# Patient Record
Sex: FEMALE | Race: BLACK OR AFRICAN AMERICAN | NOT HISPANIC OR LATINO | Employment: OTHER | URBAN - METROPOLITAN AREA
[De-identification: names, ages, dates, MRNs, and addresses within clinical notes are randomized per-mention and may not be internally consistent; named-entity substitution may affect disease eponyms.]

---

## 2018-08-13 ENCOUNTER — HOSPITAL ENCOUNTER (EMERGENCY)
Facility: CLINIC | Age: 38
Discharge: HOME | End: 2018-08-13
Attending: EMERGENCY MEDICINE | Admitting: EMERGENCY MEDICINE
Payer: MEDICAID

## 2018-08-13 VITALS
RESPIRATION RATE: 16 BRPM | DIASTOLIC BLOOD PRESSURE: 86 MMHG | HEART RATE: 71 BPM | TEMPERATURE: 97.7 F | SYSTOLIC BLOOD PRESSURE: 136 MMHG | OXYGEN SATURATION: 100 %

## 2018-08-13 DIAGNOSIS — R42 LIGHTHEADEDNESS: ICD-10-CM

## 2018-08-13 PROCEDURE — 99282 EMERGENCY DEPT VISIT SF MDM: CPT | Performed by: EMERGENCY MEDICINE

## 2018-08-13 PROCEDURE — 99282 EMERGENCY DEPT VISIT SF MDM: CPT | Mod: Z6 | Performed by: EMERGENCY MEDICINE

## 2018-08-13 RX ORDER — HYDROCHLOROTHIAZIDE 12.5 MG/1
25 TABLET ORAL DAILY
COMMUNITY
End: 2018-10-30

## 2018-08-13 ASSESSMENT — ENCOUNTER SYMPTOMS
POLYDIPSIA: 0
LIGHT-HEADEDNESS: 0
AGITATION: 0
CHILLS: 0
BRUISES/BLEEDS EASILY: 0
FEVER: 0
ADENOPATHY: 0
NECK STIFFNESS: 0
BACK PAIN: 0
COLOR CHANGE: 0
NECK PAIN: 0
SHORTNESS OF BREATH: 0
VOMITING: 0
NAUSEA: 0
ABDOMINAL PAIN: 0

## 2018-08-13 NOTE — ED AVS SNAPSHOT
[facility], Emergency Department    [address]    [place] [address]    Phone:  [phone]    Fax:  [phone]                                       Elizabeth Hinton   MRN: [id number]    Department:  [facility], Emergency Department   Date of Visit:  8/13/2018           Patient Information     Date Of Birth          1980        Your diagnoses for this visit were:     Lightheadedness        You were seen by Jonah Foster MD.        Discharge Instructions       Please make an appointment to [facility] with [facility] (phone: [phone] as soon as possible in order to establish primary care and for further evaluation.  If her symptoms return please come back to the emergency department.      24 Hour Appointment Hotline       To make an appointment at any [facility] clinic, call [phone] ([phone]). If you don't have a family doctor or clinic, we will help you find one. [facility] clinics are conveniently located to serve the needs of you and your family.             Review of your medicines      Our records show that you are taking the medicines listed below. If these are incorrect, please call your family doctor or clinic.        Dose / Directions Last dose taken    hydrochlorothiazide 12.5 MG Tabs tablet   Dose:  25 mg        Take 25 mg by mouth daily   Refills:  0                Orders Needing Specimen Collection     None      Pending Results     No orders found from 8/11/2018 to 8/14/2018.            Pending Culture Results     No orders found from 8/11/2018 to 8/14/2018.            Pending Results Instructions     If you had any lab results that were not finalized at the time of your Discharge, you can call the ED Lab Result RN at [phone]. You will be contacted by this team for any positive Lab results or changes in treatment. The nurses are available 7 days a week from 10A to 6:30P.  You can leave a message 24 hours per day and they will return your call.       "  Thank you for choosing [facility]       Thank you for choosing [facility] for your care. Our goal is always to provide you with excellent care. Hearing back from our patients is one way we can continue to improve our services. Please take a few minutes to complete the written survey that you may receive in the mail after you visit with us. Thank you!        [employer] Information     [employer] lets you send messages to your doctor, view your test results, renew your prescriptions, schedule appointments and more. To sign up, go to [email or web address] . Click on \"Log in\" on the left side of the screen, which will take you to the Welcome page. Then click on \"Sign up Now\" on the right side of the page.     You will be asked to enter the access code listed below, as well as some personal information. Please follow the directions to create your username and password.     Your access code is: [email or web address]  Expires: 2018  5:50 PM     Your access code will  in 90 days. If you need help or a new code, please call your [facility] clinic or [phone].        Care EveryWhere ID     This is your Care EveryWhere ID. This could be used by other organizations to access your [facility] medical records  [id number]        Equal Access to Services     MARTA BRONSON : Srinivasa madrido Ean, waaxda luqadaha, qaybta kaalmada adehermiloyada, trino finch. So [facility] [phone].    ATENCIÓN: Si habla español, tiene a matt disposición servicios gratuitos de asistencia lingüística. Llame al [phone].    We comply with applicable federal civil rights laws and [place] laws. We do not discriminate on the basis of race, color, national origin, age, disability, sex, sexual orientation, or gender identity.            After Visit Summary       This is your record. Keep this with you and show to your community pharmacist(s) and doctor(s) at your next visit.                  "

## 2018-08-13 NOTE — DISCHARGE INSTRUCTIONS
Please make an appointment to follow up with [facility] (phone: [phone] as soon as possible in order to establish primary care and for further evaluation.  If her symptoms return please come back to the emergency department.

## 2018-08-13 NOTE — ED PROVIDER NOTES
History     Chief Complaint   Patient presents with     Dizziness     Hypertension     HPI  Elizabeth Hinton is a 38 year old female who presents to the emergency department due to one episode of brief lightheadedness. She thought that her blood pressure might have been elevated at the time. Episode resolved within several minutes and she has been asymptomatic ever since. She denies any pain or fevers. No nausea/vomiting. No headaches. No visual changes. No neck pain. No chest pain or shortness of breath. She states that she was incarcerated for the last 2.5 years and that she was released approximately 2 months ago. However, for the last 1.5 months she has not had her antihypertensive medications. No other concerns or complaints.    This part of the document was transcribed by Meera Galvan, Medical Scribe.     Past Medical History:   Diagnosis Date     Abnormal Pap smear of vagina      Hypertension        History reviewed. No pertinent surgical history.    No family history on file.    Social History   Substance Use Topics     Smoking status: Current Every Day Smoker     Packs/day: 1.00     Smokeless tobacco: Never Used     Alcohol use No       No current facility-administered medications for this encounter.      Current Outpatient Prescriptions   Medication     hydrochlorothiazide 12.5 MG TABS tablet      No Known Allergies    I have reviewed the Medications, Allergies, Past Medical and Surgical History, and Social History in the Epic system.    Review of Systems   Constitutional: Negative for chills and fever.   HENT: Negative for congestion.    Eyes: Negative for visual disturbance.   Respiratory: Negative for shortness of breath.    Cardiovascular: Negative for chest pain.   Gastrointestinal: Negative for abdominal pain, nausea and vomiting.   Endocrine: Negative for polydipsia and polyuria.   Musculoskeletal: Negative for back pain, neck pain and neck stiffness.   Skin: Negative for color change.    Neurological: Negative for light-headedness.   Hematological: Negative for adenopathy. Does not bruise/bleed easily.   Psychiatric/Behavioral: Negative for agitation and behavioral problems.   All other systems reviewed and are negative.      Physical Exam   BP: 117/70  Pulse: 80  Temp: 98.7  F (37.1  C)  Resp: 16  SpO2: 100 %      Physical Exam   Constitutional: She is oriented to person, place, and time. She appears well-developed and well-nourished. No distress.   HENT:   Head: Normocephalic and atraumatic.   Mouth/Throat: Oropharynx is clear and moist. No oropharyngeal exudate.   Eyes: Conjunctivae and EOM are normal. Pupils are equal, round, and reactive to light. No scleral icterus.   Neck: Normal range of motion. Neck supple.   Cardiovascular: Normal rate, normal heart sounds and intact distal pulses.    Pulmonary/Chest: Effort normal and breath sounds normal. No respiratory distress.   Abdominal: Soft. Bowel sounds are normal. There is no tenderness.   Musculoskeletal: Normal range of motion. She exhibits no edema or tenderness.   Lymphadenopathy:     She has no cervical adenopathy.   Neurological: She is alert and oriented to person, place, and time. She has normal strength and normal reflexes. She displays normal reflexes. No cranial nerve deficit or sensory deficit. She exhibits normal muscle tone. Coordination and gait normal. GCS eye subscore is 4. GCS verbal subscore is 5. GCS motor subscore is 6.   No dysarthria, dysmetria, diplopia, disdiadochokinesia. Strength 5/5 in b/l UEs with  and b/l LEs with dorsi- and plantar-flexion against resistance. Sensation to light touch and 2-point discrimination intact in b/l distal UEs and LEs. CNs II-XII intact. Negative Romberg. Normal gait.     Skin: Skin is warm. No rash noted. She is not diaphoretic.   Psychiatric: She has a normal mood and affect. Her behavior is normal. Judgment and thought content normal.   Nursing note and vitals reviewed.      ED  Course     ED Course     Procedures             Critical Care time:  none             Labs Ordered and Resulted from Time of ED Arrival Up to the Time of Departure from the ED - No data to display         Assessments & Plan (with Medical Decision Making)   38 year old woman presenting with lightheadedness. Differential diagnosis: hypertension, unlikely arhythmia, unlikely electrolyte abnormalities.    After thorough history and physical exam patient appears to be in no acute distress. She has been asymptomatic ever since she came to the emergency department. She is not hypertensive. I offered to give her a prescription for hydrocholothiazide, however, she declined and stated if she has been off medication for the last 1.5 months she doesn't feel she needs them until she follows up with her primary care clinic. I believe this is reasonable. I did inform her that she should return to the emergency department if her symptoms return and she agrees with this plan. I do not believe that any further testing is necessary at this time. [place] is normal at time of discharge.    This part of the document was transcribed by Meera Galvan, Medical Scribe.     I have reviewed the nursing notes.    I have reviewed the findings, diagnosis, plan and need for follow up with the patient.    Discharge Medication List as of 8/13/2018  6:02 PM          Final diagnoses:   Lightheadedness     I was physically present and have reviewed and verified the accuracy of this note documented by my scribe.    Jonah Foster MD      8/13/2018   [facility], [place], EMERGENCY DEPARTMENT     Jonah Foster MD  08/14/18 0138

## 2018-08-13 NOTE — ED AVS SNAPSHOT
[facility], Emergency Department    [address]    [facility] [address]    Phone:  [phone]    Fax:  [phone]                                       Elizabeth Hinton   MRN: [id number]    Department:  [facility], Emergency Department   Date of Visit:  8/13/2018           After Visit Summary Signature Page     I have received my discharge instructions, and my questions have been answered. I have discussed any challenges I see with this plan with the nurse or doctor.    ..........................................................................................................................................  Patient/Patient Representative Signature      ..........................................................................................................................................  Patient Representative Print Name and Relationship to Patient    ..................................................               ................................................  Date                                            Time    ..........................................................................................................................................  Reviewed by Signature/Title    ...................................................              ..............................................  Date                                                            Time

## 2018-10-18 ENCOUNTER — OFFICE VISIT (OUTPATIENT)
Dept: FAMILY MEDICINE | Facility: CLINIC | Age: 38
End: 2018-10-18
Payer: MEDICAID

## 2018-10-18 ENCOUNTER — RESULT FOLLOW UP (OUTPATIENT)
Dept: FAMILY MEDICINE | Facility: CLINIC | Age: 38
End: 2018-10-18

## 2018-10-18 VITALS
TEMPERATURE: 99 F | DIASTOLIC BLOOD PRESSURE: 80 MMHG | HEART RATE: 102 BPM | BODY MASS INDEX: 28.7 KG/M2 | HEIGHT: 63 IN | WEIGHT: 162 LBS | OXYGEN SATURATION: 97 % | SYSTOLIC BLOOD PRESSURE: 146 MMHG | RESPIRATION RATE: 16 BRPM

## 2018-10-18 DIAGNOSIS — Z11.4 SCREENING FOR HIV (HUMAN IMMUNODEFICIENCY VIRUS): ICD-10-CM

## 2018-10-18 DIAGNOSIS — R30.0 DYSURIA: ICD-10-CM

## 2018-10-18 DIAGNOSIS — Z87.42 HISTORY OF ABNORMAL CERVICAL PAP SMEAR: ICD-10-CM

## 2018-10-18 DIAGNOSIS — R87.610 ASCUS WITH POSITIVE HIGH RISK HPV CERVICAL: ICD-10-CM

## 2018-10-18 DIAGNOSIS — Z11.3 SCREEN FOR STD (SEXUALLY TRANSMITTED DISEASE): ICD-10-CM

## 2018-10-18 DIAGNOSIS — R03.0 ELEVATED BP WITHOUT DIAGNOSIS OF HYPERTENSION: ICD-10-CM

## 2018-10-18 DIAGNOSIS — R87.810 ASCUS WITH POSITIVE HIGH RISK HPV CERVICAL: ICD-10-CM

## 2018-10-18 DIAGNOSIS — F17.200 TOBACCO DEPENDENCE SYNDROME: ICD-10-CM

## 2018-10-18 DIAGNOSIS — G43.711 INTRACTABLE CHRONIC MIGRAINE WITHOUT AURA AND WITH STATUS MIGRAINOSUS: ICD-10-CM

## 2018-10-18 DIAGNOSIS — N76.0 BV (BACTERIAL VAGINOSIS): ICD-10-CM

## 2018-10-18 DIAGNOSIS — B96.89 BV (BACTERIAL VAGINOSIS): ICD-10-CM

## 2018-10-18 DIAGNOSIS — Z00.01 ENCOUNTER FOR ROUTINE ADULT HEALTH EXAMINATION WITH ABNORMAL FINDINGS: Primary | ICD-10-CM

## 2018-10-18 LAB
ALBUMIN UR-MCNC: NEGATIVE MG/DL
APPEARANCE UR: CLEAR
BACTERIA #/AREA URNS HPF: ABNORMAL /HPF
BILIRUB UR QL STRIP: NEGATIVE
COLOR UR AUTO: YELLOW
GLUCOSE UR STRIP-MCNC: NEGATIVE MG/DL
HGB UR QL STRIP: NEGATIVE
KETONES UR STRIP-MCNC: NEGATIVE MG/DL
LEUKOCYTE ESTERASE UR QL STRIP: ABNORMAL
NITRATE UR QL: NEGATIVE
NON-SQ EPI CELLS #/AREA URNS LPF: ABNORMAL /LPF
PH UR STRIP: 6.5 PH (ref 5–7)
RBC #/AREA URNS AUTO: ABNORMAL /HPF
SOURCE: ABNORMAL
SP GR UR STRIP: 1.02 (ref 1–1.03)
UROBILINOGEN UR STRIP-ACNC: 0.2 EU/DL (ref 0.2–1)
WBC #/AREA URNS AUTO: ABNORMAL /HPF

## 2018-10-18 PROCEDURE — 88175 CYTOPATH C/V AUTO FLUID REDO: CPT | Performed by: PHYSICIAN ASSISTANT

## 2018-10-18 PROCEDURE — 87086 URINE CULTURE/COLONY COUNT: CPT | Performed by: PHYSICIAN ASSISTANT

## 2018-10-18 PROCEDURE — 81001 URINALYSIS AUTO W/SCOPE: CPT | Performed by: PHYSICIAN ASSISTANT

## 2018-10-18 PROCEDURE — 87491 CHLMYD TRACH DNA AMP PROBE: CPT | Performed by: PHYSICIAN ASSISTANT

## 2018-10-18 PROCEDURE — 87210 SMEAR WET MOUNT SALINE/INK: CPT | Performed by: PHYSICIAN ASSISTANT

## 2018-10-18 PROCEDURE — 99213 OFFICE O/P EST LOW 20 MIN: CPT | Mod: 25 | Performed by: PHYSICIAN ASSISTANT

## 2018-10-18 PROCEDURE — 87624 HPV HI-RISK TYP POOLED RSLT: CPT | Performed by: PHYSICIAN ASSISTANT

## 2018-10-18 PROCEDURE — 99385 PREV VISIT NEW AGE 18-39: CPT | Mod: 25 | Performed by: PHYSICIAN ASSISTANT

## 2018-10-18 PROCEDURE — 87591 N.GONORRHOEAE DNA AMP PROB: CPT | Performed by: PHYSICIAN ASSISTANT

## 2018-10-18 PROCEDURE — G0476 HPV COMBO ASSAY CA SCREEN: HCPCS | Performed by: PHYSICIAN ASSISTANT

## 2018-10-18 PROCEDURE — 88141 CYTOPATH C/V INTERPRET: CPT | Performed by: PHYSICIAN ASSISTANT

## 2018-10-18 RX ORDER — METRONIDAZOLE 7.5 MG/G
1 GEL VAGINAL 2 TIMES DAILY
Qty: 50 G | Refills: 0 | Status: SHIPPED | OUTPATIENT
Start: 2018-10-18 | End: 2018-10-23

## 2018-10-18 RX ORDER — TOPIRAMATE 25 MG/1
TABLET, FILM COATED ORAL
Qty: 70 TABLET | Refills: 0 | Status: SHIPPED | OUTPATIENT
Start: 2018-10-18 | End: 2018-10-30

## 2018-10-18 RX ORDER — NICOTINE 21 MG/24HR
1 PATCH, TRANSDERMAL 24 HOURS TRANSDERMAL EVERY 24 HOURS
Qty: 45 PATCH | Refills: 0 | Status: SHIPPED | OUTPATIENT
Start: 2018-10-18 | End: 2019-12-09

## 2018-10-18 NOTE — LETTER
March 20, 2019      Elizabeth G Jamaal  [address]  [facility] [address]      Dear Ms. Hinton,    At [facility], your health and wellness is our primary concern. That is why we are following up on a colposcopy  from 12/21/18, which was reported as PASTORA 2/3.  Your Provider had recommended that you have a LEEP  completed by 03/21/19. Our records do not show that this has been done or scheduled.    It is important to complete the follow up that your provider has suggested for you to ensure that there are no worsening changes which may, over time, develop into cancer.      Please call our office at  [phone] to schedule an appointment for a LEEP (this cannot be scheduled through [facility]) at your earliest convenience. If you have questions or concerns, please call the clinic and we will be happy to assist you.    If you have completed the tests outside of [facility], please have the results forwarded to our office. We will update the chart for your primary Physician to review before your next annual physical.     Thank you for choosing [facility]!    Sincerely,      Your [facility] Care Team/[facility]

## 2018-10-18 NOTE — LETTER
December 18, 2018      Elizabeth FLOR Jamaal  [address]  [facility] [address]    Dear ,      At [facility], your health and wellness is our primary concern. That is why we are following up on an abnormal pap from 10/18/18, which was reported as ASCUS and positive for high risk HPV. Your provider had recommended that you have a Colposcopy  completed by 01/18/19. Our records do not show that this has been scheduled.    It is important to complete the follow up that your provider has suggested for you to ensure that there are no worsening changes which may, over time, develop into cancer.      Please contact our office at  [phone] to schedule an appointment for a Colposcopy (this cannot be scheduled through [facility]) at your earliest convenience. If you have questions or concerns, please call the clinic and we will be happy to assist you.    If you have completed the tests outside of [facility], please have the results forwarded to our office. We will update the chart for your primary Physician to review before your next annual physical.     Thank you for choosing [facility]!    Sincerely,      Patricia Brown PA-C/amanda

## 2018-10-18 NOTE — LETTER
August 13, 2019      Elizabeth CHACHO Jamaal  [address]  [facility] [address]      Dear Ms. Hinton,    At [facility], your health and wellness is our primary concern. That is why we are following up on a positive high risk HPV test from 06/13/19.  Your Provider had recommended that you have a Colposcopy completed by 09/13/19. Our records do not show that this has been scheduled.    It is important to complete the follow up that your provider has suggested for you to ensure that there are no worsening changes which may, over time, develop into cancer.      Please call our office at [phone] to schedule an appointment for a Colposcopy (this cannot be scheduled through [facility]) at your earliest convenience. If you have questions or concerns, please call the clinic and we will be happy to assist you.    If you have completed the tests outside of [facility], please have the results forwarded to our office. We will update the chart for your primary Physician to review before your next annual physical.     Thank you for choosing [facility]!    Sincerely,      Your [facility] Care Team/[facility]

## 2018-10-18 NOTE — LETTER
December 19, 2019      Elizabeth G Jamaal  [address] [facility] [address]    Dear MsJuan CarlosJamaal,      At [facility], your health and wellness is our primary concern. That is why we are following up on a positive high risk HPV test from 06/13/19.  Your Provider had recommended that you have a Colposcopy completed by 09/13/19. Our records do not show that this has been done or scheduled.      If you have chosen not to do the recommended colposcopy, please contact our office at [phone] to schedule an appointment for a repeat PAP smear and HPV test at this time.    If you have completed the tests outside of [facility], please have the results forwarded to our office. We will update the chart for your primary Physician to review before your next annual physical.     Thank you for choosing [facility]!    Sincerely,      Your [facility] Care Team//[facility]

## 2018-10-18 NOTE — PATIENT INSTRUCTIONS
Start topamax as directed  Bring your results for PAP  Obtain blood pressure machine from drug store  Take blood pressure daily after sitting calm for 10 minutes  Record readings daily for 2 weeks  Recheck with me in 1-2 weeks  Nothing to eat or drink for 8 hours prior except water  Return to clinic for any new or worsening symptoms or go to ER Urgent care in off hours      Preventive Health Recommendations  Female Ages 26 - 39  Yearly exam:   See your health care provider every year in order to    Review health changes.     Discuss preventive care.      Review your medicines if you your doctor has prescribed any.    Until age 30: Get a Pap test every three years (more often if you have had an abnormal result).    After age 30: Talk to your doctor about whether you should have a Pap test every 3 years or have a Pap test with HPV screening every 5 years.   You do not need a Pap test if your uterus was removed (hysterectomy) and you have not had cancer.  You should be tested each year for STDs (sexually transmitted diseases), if you're at risk.   Talk to your provider about how often to have your cholesterol checked.  If you are at risk for diabetes, you should have a diabetes test (fasting glucose).  Shots: Get a flu shot each year. Get a tetanus shot every 10 years.   Nutrition:     Eat at least 5 servings of fruits and vegetables each day.    Eat whole-grain bread, whole-wheat pasta and brown rice instead of white grains and rice.    Get adequate Calcium and Vitamin D.     Lifestyle    Exercise at least 150 minutes a week (30 minutes a day, 5 days of the week). This will help you control your weight and prevent disease.    Limit alcohol to one drink per day.    No smoking.     Wear sunscreen to prevent skin cancer.    See your dentist every six months for an exam and cleaning.
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
## 2018-10-18 NOTE — MR AVS SNAPSHOT
After Visit Summary   10/18/2018    Elizabeth Hinton    MRN: [id number]           Patient Information     Date Of Birth          1980        Visit Information        Provider Department      10/18/2018 2:20 PM Patricia Brown PA-C [facility]        Today's Diagnoses     Dysuria    -  1    Screening for malignant neoplasm of cervix        Screening for HIV (human immunodeficiency virus)        History of abnormal cervical Pap smear        Lightheadedness        Elevated BP without diagnosis of hypertension        Intractable chronic migraine without aura and with status migrainosus        Screen for STD (sexually transmitted disease)          Care Instructions    Start topamax as directed  Bring your results for PAP  Obtain blood pressure machine from drug store  Take blood pressure daily after sitting calm for 10 minutes  Record readings daily for 2 weeks  Recheck with me in 1-2 weeks  Nothing to eat or drink for 8 hours prior except water  Return to clinic for any new or worsening symptoms or go to ER Urgent care in off hours      Preventive Health Recommendations  Female Ages 26 - 39  Yearly exam:   See your health care provider every year in order to    Review health changes.     Discuss preventive care.      Review your medicines if you your doctor has prescribed any.    Until age 30: Get a Pap test every three years (more often if you have had an abnormal result).    After age 30: Talk to your doctor about whether you should have a Pap test every 3 years or have a Pap test with HPV screening every 5 years.   You do not need a Pap test if your uterus was removed (hysterectomy) and you have not had cancer.  You should be tested each year for STDs (sexually transmitted diseases), if you're at risk.   Talk to your provider about how often to have your cholesterol checked.  If you are at risk for diabetes, you should have a diabetes test (fasting glucose).  Shots: Get  "a flu shot each year. Get a tetanus shot every 10 years.   Nutrition:     Eat at least 5 servings of fruits and vegetables each day.    Eat whole-grain bread, whole-wheat pasta and brown rice instead of white grains and rice.    Get adequate Calcium and Vitamin D.     Lifestyle    Exercise at least 150 minutes a week (30 minutes a day, 5 days of the week). This will help you control your weight and prevent disease.    Limit alcohol to one drink per day.    No smoking.     Wear sunscreen to prevent skin cancer.    See your dentist every six months for an exam and cleaning.            Follow-ups after your visit        Who to contact     If you have questions or need follow up information about today's clinic visit or your schedule please contact [facility] directly at [phone].  Normal or non-critical lab and imaging results will be communicated to you by MyChart, letter or phone within 4 business days after the clinic has received the results. If you do not hear from us within 7 days, please contact the clinic through MyChart or phone. If you have a critical or abnormal lab result, we will notify you by phone as soon as possible.  Submit refill requests through Aras or call your pharmacy and they will forward the refill request to us. Please allow 3 business days for your refill to be completed.          Additional Information About Your Visit        Aras Information     Aras lets you send messages to your doctor, view your test results, renew your prescriptions, schedule appointments and more. To sign up, go to [email or web address] . Click on \"Log in\" on the left side of the screen, which will take you to the Welcome page. Then click on \"Sign up Now\" on the right side of the page.     You will be asked to enter the access code listed below, as well as some personal information. Please follow the directions to create your username and password.     Your access code is: " "[email or web address]  Expires: 2018  5:50 PM     Your access code will  in 90 days. If you need help or a new code, please call your [place] clinic or [phone].        Care EveryWhere ID     This is your Care EveryWhere ID. This could be used by other organizations to access your [place] medical records  [id number]        Your Vitals Were     Pulse Temperature Respirations Height Last Period Pulse Oximetry    102 99  F (37.2  C) (Oral) 16 5' 3\" (1.6 m) 2018 (Approximate) 97%    BMI (Body Mass Index)                   28.7 kg/m2            Blood Pressure from Last 3 Encounters:   10/18/18 146/80   18 136/86    Weight from Last 3 Encounters:   10/18/18 162 lb (73.5 kg)              We Performed the Following     *UA reflex to Microscopic and Culture ([place] [facility] (except [place] and [place])     CHLAMYDIA TRACHOMATIS PCR     HIV Screening     HPV High Risk Types DNA Cervical     NEISSERIA GONORRHOEA PCR     Pap imaged thin layer diagnostic with HPV (select HPV order below)     Urine Microscopic     Wet prep          Today's Medication Changes          These changes are accurate as of 10/18/18  3:05 PM.  If you have any questions, ask your nurse or doctor.               Start taking these medicines.        Dose/Directions    order for DME   Used for:  Elevated BP without diagnosis of hypertension   Started by:  Patricia Brown PA-C        Equipment being ordered: blood pressure machine   Quantity:  1 each   Refills:  0       topiramate 25 MG tablet   Commonly known as:  TOPAMAX   Used for:  Intractable chronic migraine without aura and with status migrainosus   Started by:  Patricia Brown PA-C        Take 1 tablet (25 mg) at bedtime for 1 week, then 1 tablet twice daily for 1 week, then 1 tablet in AM and 2 in PM for 1 week, then 2 tablets twice daily.   Quantity:  70 tablet   Refills:  0            Where to get your medicines      Some of these will " need a paper prescription and others can be bought over the counter.  Ask your nurse if you have questions.     Bring a paper prescription for each of these medications     order for DME    topiramate 25 MG tablet                Primary Care Provider Fax #    Physician No Ref-Primary [phone]       No address on file        Equal Access to Services     MARTA BRONSON : Hadii aad ku hadjuliannao Soomaali, waaxda luqadaha, qaybta kaalmada adeegyada, trino gutierrez vedaalcides jonesstephanlanie finch. So [facility] [phone].    ATENCIÓN: Si habla español, tiene a matt disposición servicios gratuitos de asistencia lingüística. Llame al [phone].    We comply with applicable federal civil rights laws and [place] laws. We do not discriminate on the basis of race, color, national origin, age, disability, sex, sexual orientation, or gender identity.            Thank you!     Thank you for choosing [facility]  for your care. Our goal is always to provide you with excellent care. Hearing back from our patients is one way we can continue to improve our services. Please take a few minutes to complete the written survey that you may receive in the mail after your visit with us. Thank you!             Your Updated Medication List - Protect others around you: Learn how to safely use, store and throw away your medicines at www.disposemymeds.org.          This list is accurate as of 10/18/18  3:05 PM.  Always use your most recent med list.                   Brand Name Dispense Instructions for use Diagnosis    hydrochlorothiazide 12.5 MG Tabs tablet      Take 25 mg by mouth daily        order for DME     1 each    Equipment being ordered: blood pressure machine    Elevated BP without diagnosis of hypertension       topiramate 25 MG tablet    TOPAMAX    70 tablet    Take 1 tablet (25 mg) at bedtime for 1 week, then 1 tablet twice daily for 1 week, then 1 tablet in AM and 2 in PM for 1 week, then 2 tablets twice daily.     Intractable chronic migraine without aura and with status migrainosus

## 2018-10-19 LAB
BACTERIA SPEC CULT: NORMAL
SPECIMEN SOURCE: ABNORMAL
SPECIMEN SOURCE: NORMAL
WET PREP SPEC: ABNORMAL

## 2018-10-20 LAB
C TRACH DNA SPEC QL NAA+PROBE: NEGATIVE
N GONORRHOEA DNA SPEC QL NAA+PROBE: NEGATIVE
SPECIMEN SOURCE: NORMAL
SPECIMEN SOURCE: NORMAL

## 2018-10-23 LAB
COPATH REPORT: ABNORMAL
PAP: ABNORMAL

## 2018-10-24 LAB
FINAL DIAGNOSIS: ABNORMAL
HPV HR 12 DNA CVX QL NAA+PROBE: POSITIVE
HPV16 DNA SPEC QL NAA+PROBE: NEGATIVE
HPV18 DNA SPEC QL NAA+PROBE: NEGATIVE
SPECIMEN DESCRIPTION: ABNORMAL
SPECIMEN SOURCE CVX/VAG CYTO: ABNORMAL

## 2018-10-30 ENCOUNTER — OFFICE VISIT (OUTPATIENT)
Dept: FAMILY MEDICINE | Facility: CLINIC | Age: 38
End: 2018-10-30
Payer: MEDICAID

## 2018-10-30 VITALS
SYSTOLIC BLOOD PRESSURE: 134 MMHG | OXYGEN SATURATION: 98 % | WEIGHT: 155.9 LBS | DIASTOLIC BLOOD PRESSURE: 82 MMHG | HEART RATE: 99 BPM | BODY MASS INDEX: 27.62 KG/M2 | TEMPERATURE: 97.7 F

## 2018-10-30 DIAGNOSIS — R53.83 OTHER FATIGUE: ICD-10-CM

## 2018-10-30 DIAGNOSIS — G43.819 OTHER MIGRAINE WITHOUT STATUS MIGRAINOSUS, INTRACTABLE: ICD-10-CM

## 2018-10-30 DIAGNOSIS — E55.9 VITAMIN D DEFICIENCY: ICD-10-CM

## 2018-10-30 DIAGNOSIS — I10 BENIGN ESSENTIAL HYPERTENSION: Primary | ICD-10-CM

## 2018-10-30 LAB
BASOPHILS # BLD AUTO: 0 10E9/L (ref 0–0.2)
BASOPHILS NFR BLD AUTO: 0.2 %
DIFFERENTIAL METHOD BLD: ABNORMAL
EOSINOPHIL # BLD AUTO: 0.2 10E9/L (ref 0–0.7)
EOSINOPHIL NFR BLD AUTO: 1.7 %
ERYTHROCYTE [DISTWIDTH] IN BLOOD BY AUTOMATED COUNT: 15.6 % (ref 10–15)
HCT VFR BLD AUTO: 40.2 % (ref 35–47)
HGB BLD-MCNC: 13.6 G/DL (ref 11.7–15.7)
LYMPHOCYTES # BLD AUTO: 3.6 10E9/L (ref 0.8–5.3)
LYMPHOCYTES NFR BLD AUTO: 40.3 %
MCH RBC QN AUTO: 27.4 PG (ref 26.5–33)
MCHC RBC AUTO-ENTMCNC: 33.8 G/DL (ref 31.5–36.5)
MCV RBC AUTO: 81 FL (ref 78–100)
MONOCYTES # BLD AUTO: 0.8 10E9/L (ref 0–1.3)
MONOCYTES NFR BLD AUTO: 8.4 %
NEUTROPHILS # BLD AUTO: 4.4 10E9/L (ref 1.6–8.3)
NEUTROPHILS NFR BLD AUTO: 49.4 %
PLATELET # BLD AUTO: 311 10E9/L (ref 150–450)
RBC # BLD AUTO: 4.96 10E12/L (ref 3.8–5.2)
VIT B12 SERPL-MCNC: 604 PG/ML (ref 193–986)
WBC # BLD AUTO: 9 10E9/L (ref 4–11)

## 2018-10-30 PROCEDURE — 82306 VITAMIN D 25 HYDROXY: CPT | Performed by: PHYSICIAN ASSISTANT

## 2018-10-30 PROCEDURE — 99214 OFFICE O/P EST MOD 30 MIN: CPT | Performed by: PHYSICIAN ASSISTANT

## 2018-10-30 PROCEDURE — 84443 ASSAY THYROID STIM HORMONE: CPT | Performed by: PHYSICIAN ASSISTANT

## 2018-10-30 PROCEDURE — 80053 COMPREHEN METABOLIC PANEL: CPT | Performed by: PHYSICIAN ASSISTANT

## 2018-10-30 PROCEDURE — 82607 VITAMIN B-12: CPT | Performed by: PHYSICIAN ASSISTANT

## 2018-10-30 PROCEDURE — 82728 ASSAY OF FERRITIN: CPT | Performed by: PHYSICIAN ASSISTANT

## 2018-10-30 PROCEDURE — 85025 COMPLETE CBC W/AUTO DIFF WBC: CPT | Performed by: PHYSICIAN ASSISTANT

## 2018-10-30 PROCEDURE — 36415 COLL VENOUS BLD VENIPUNCTURE: CPT | Performed by: PHYSICIAN ASSISTANT

## 2018-10-30 RX ORDER — TOPIRAMATE 100 MG/1
100 TABLET, FILM COATED ORAL DAILY
Qty: 60 TABLET | Refills: 1 | Status: SHIPPED | OUTPATIENT
Start: 2018-10-30 | End: 2019-02-28

## 2018-10-30 RX ORDER — AMLODIPINE BESYLATE 5 MG/1
5 TABLET ORAL DAILY
Qty: 30 TABLET | Refills: 1 | Status: SHIPPED | OUTPATIENT
Start: 2018-10-30 | End: 2019-12-09

## 2018-10-30 NOTE — PATIENT INSTRUCTIONS
[address]  [facility] [address]    Start amlodipine for blood pressure   Continue to take blood pressure daily  Recheck 3-4 weeks    Continue topamax    Start multivitamin daily    vaseline to hands at night and cover with cotton gloves    Labs today    Prioritize 8 hours of sleep nightly  Return to clinic for any new or worsening symptoms or go to ER Urgent care in off hours

## 2018-10-30 NOTE — MR AVS SNAPSHOT
"              After Visit Summary   10/30/2018    Elizabeth Hinton    MRN: [id number]           Patient Information     Date Of Birth          1980        Visit Information        Provider Department      10/30/2018 1:40 PM Patricia Brown PA-C [facility] [place]        Today's Diagnoses     Benign essential hypertension    -  1    Other migraine without status migrainosus, intractable        Vitamin D deficiency        Other fatigue          Care Instructions    [place] [address]  [facility] [address]    Start amlodipine for blood pressure   Continue to take blood pressure daily  Recheck 3-4 weeks    Continue topamax    Start multivitamin daily    vaseline to hands at night and cover with cotton gloves    Labs today    Prioritize 8 hours of sleep nightly  Return to clinic for any new or worsening symptoms or go to ER Urgent care in off hours            Follow-ups after your visit        Who to contact     If you have questions or need follow up information about today's clinic visit or your schedule please contact [facility] directly at [phone].  Normal or non-critical lab and imaging results will be communicated to you by Revenewhart, letter or phone within 4 business days after the clinic has received the results. If you do not hear from us within 7 days, please contact the clinic through ConXtecht or phone. If you have a critical or abnormal lab result, we will notify you by phone as soon as possible.  Submit refill requests through [employer] or call your pharmacy and they will forward the refill request to us. Please allow 3 business days for your refill to be completed.          Additional Information About Your Visit        Revenewhart Information     [employer] lets you send messages to your doctor, view your test results, renew your prescriptions, schedule appointments and more. To sign up, go to [email or web address] . Click on \"Log in\" on the left side " "of the screen, which will take you to the Welcome page. Then click on \"Sign up Now\" on the right side of the page.     You will be asked to enter the access code listed below, as well as some personal information. Please follow the directions to create your username and password.     Your access code is: [email or web address]  Expires: 2018  5:50 PM     Your access code will  in 90 days. If you need help or a new code, please call your [place] clinic or [phone].        Care EveryWhere ID     This is your Care EveryWhere ID. This could be used by other organizations to access your [place] medical records  [id number]        Your Vitals Were     Pulse Temperature Last Period Pulse Oximetry BMI (Body Mass Index)       99 97.7  F (36.5  C) (Oral) 10/29/2018 98% 27.62 kg/m2        Blood Pressure from Last 3 Encounters:   10/30/18 134/82   10/18/18 146/80   18 136/86    Weight from Last 3 Encounters:   10/30/18 155 lb 14.4 oz (70.7 kg)   10/18/18 162 lb (73.5 kg)              We Performed the Following     CBC with platelets and differential     Comprehensive metabolic panel     Ferritin     TSH with free T4 reflex     Vitamin B12     Vitamin D Deficiency          Today's Medication Changes          These changes are accurate as of 10/30/18  2:15 PM.  If you have any questions, ask your nurse or doctor.               Start taking these medicines.        Dose/Directions    amLODIPine 5 MG tablet   Commonly known as:  NORVASC   Used for:  Benign essential hypertension   Started by:  Patricia Brown PA-C        Dose:  5 mg   Take 1 tablet (5 mg) by mouth daily   Quantity:  30 tablet   Refills:  1         These medicines have changed or have updated prescriptions.        Dose/Directions    topiramate 100 MG tablet   Commonly known as:  TOPAMAX   This may have changed:    - medication strength  - how much to take  - how to take this  - when to take this  - additional instructions   Used for:  Other " migraine without status migrainosus, intractable   Changed by:  Patricia Brown PA-C        Dose:  100 mg   Take 1 tablet (100 mg) by mouth daily   Quantity:  60 tablet   Refills:  1         Stop taking these medicines if you haven't already. Please contact your care team if you have questions.     hydrochlorothiazide 12.5 MG Tabs tablet   Stopped by:  Patricia Brown PA-C                Where to get your medicines      These medications were sent to [facility] [address] [facility] [address] AT [facility] [address], [facility] [address]     Phone:  [phone]     amLODIPine 5 MG tablet    topiramate 100 MG tablet                Primary Care Provider Fax #    Physician No Ref-Primary [phone]       No address on file        Equal Access to Services     MARTA BRONSON : Srinivasa nicolas Soselwyn, waaxda luqadaha, qaybta kaalmada areliyajorge, trino lynne . So [facility] [phone].    ATENCIÓN: Si habla español, tiene a matt disposición servicios gratuitos de asistencia lingüística. Rafael al [phone].    We comply with applicable federal civil rights laws and [place] laws. We do not discriminate on the basis of race, color, national origin, age, disability, sex, sexual orientation, or gender identity.            Thank you!     Thank you for choosing [facility]  for your care. Our goal is always to provide you with excellent care. Hearing back from our patients is one way we can continue to improve our services. Please take a few minutes to complete the written survey that you may receive in the mail after your visit with us. Thank you!             Your Updated Medication List - Protect others around you: Learn how to safely use, store and throw away your medicines at www.disposemymeds.org.          This list is accurate as of 10/30/18  2:15 PM.  Always use your most recent med list.                    Brand Name Dispense Instructions for use Diagnosis    amLODIPine 5 MG tablet    NORVASC    30 tablet    Take 1 tablet (5 mg) by mouth daily    Benign essential hypertension       * nicotine 14 MG/24HR 24 hr patch    NICODERM CQ    45 patch    Place 1 patch onto the skin every 24 hours For 6 weeks, then drop to 7 mg/d    Tobacco dependence syndrome       * nicotine 21 MG/24HR 24 hr patch    NICODERM CQ    45 patch    Place 1 patch onto the skin every 24 hours For 6 weeks, then drop to 14mg/d    Tobacco dependence syndrome       * nicotine 7 MG/24HR 24 hr patch    NICODERM CQ    14 patch    Place 1 patch onto the skin every 24 hours For 2 weeks    Tobacco dependence syndrome       order for DME     1 each    Equipment being ordered: blood pressure machine    Elevated BP without diagnosis of hypertension       topiramate 100 MG tablet    TOPAMAX    60 tablet    Take 1 tablet (100 mg) by mouth daily    Other migraine without status migrainosus, intractable       * Notice:  This list has 3 medication(s) that are the same as other medications prescribed for you. Read the directions carefully, and ask your doctor or other care provider to review them with you.

## 2018-10-30 NOTE — PROGRESS NOTES
SUBJECTIVE:   Elizabeth Hinton is a 38 year old female who presents to clinic today for the following health issues:    Patient is here to follow up from last visit physical.   Blood pressure has been running about the same    Hypertension Follow-up      Outpatient blood pressures are being checked at home.  Results are 140/90.    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 Not very many vegetables  Also only sleeping 5 hours per night    Wants refill of the topamax at 100 mg dose. Currently tolerating it well    Hands are very dry and itchy. She admits to using soaps and lotions with fragrances and dyes        Problem list and histories reviewed & adjusted, as indicated.  Additional history: as documented    ROS:  EYES: NEGATIVE for vision changes or irritation  ENT/MOUTH: NEGATIVE for ear, mouth and throat problems  RESP: NEGATIVE for significant cough or SOB  CV: NEGATIVE for chest pain, palpitations or peripheral edema  GI: NEGATIVE for nausea, abdominal pain, heartburn, or change in bowel habits  : NEGATIVE for frequency, dysuria, or hematuria  MUSCULOSKELETAL: NEGATIVE for significant arthralgias or myalgia  NEURO: NEGATIVE for weakness, dizziness or paresthesias  ENDOCRINE: NEGATIVE for temperature intolerance, skin/hair changes  HEME: NEGATIVE for bleeding problems  PSYCHIATRIC: NEGATIVE for changes in mood or affect    Patient Active Problem List   Diagnosis     Elevated BP without diagnosis of hypertension     ASCUS with positive high risk HPV cervical     History reviewed. No pertinent surgical history.    Social History   Substance Use Topics     Smoking status: Current Every Day  Smoker     Packs/day: 1.00     Smokeless tobacco: Never Used     Alcohol use No     Family History   Problem Relation Age of Onset     Thyroid Disease Mother      HEART DISEASE Mother      Aneurysm Father            Labs reviewed in EPIC  BP Readings from Last 3 Encounters:   10/30/18 134/82   10/18/18 146/80   08/13/18 136/86    Wt Readings from Last 3 Encounters:   10/30/18 155 lb 14.4 oz (70.7 kg)   10/18/18 162 lb (73.5 kg)                  Patient Active Problem List   Diagnosis     Elevated BP without diagnosis of hypertension     ASCUS with positive high risk HPV cervical     History reviewed. No pertinent surgical history.    Social History   Substance Use Topics     Smoking status: Current Every Day Smoker     Packs/day: 1.00     Smokeless tobacco: Never Used     Alcohol use No     Family History   Problem Relation Age of Onset     Thyroid Disease Mother      HEART DISEASE Mother      Aneurysm Father          Current Outpatient Prescriptions   Medication Sig Dispense Refill     amLODIPine (NORVASC) 5 MG tablet Take 1 tablet (5 mg) by mouth daily 30 tablet 1     order for DME Equipment being ordered: blood pressure machine 1 each 0     topiramate (TOPAMAX) 100 MG tablet Take 1 tablet (100 mg) by mouth daily 60 tablet 1     nicotine (NICODERM CQ) 14 MG/24HR 24 hr patch Place 1 patch onto the skin every 24 hours For 6 weeks, then drop to 7 mg/d (Patient not taking: Reported on 10/30/2018) 45 patch 0     nicotine (NICODERM CQ) 21 MG/24HR 24 hr patch Place 1 patch onto the skin every 24 hours For 6 weeks, then drop to 14mg/d (Patient not taking: Reported on 10/30/2018) 45 patch 0     nicotine (NICODERM CQ) 7 MG/24HR 24 hr patch Place 1 patch onto the skin every 24 hours For 2 weeks (Patient not taking: Reported on 10/30/2018) 14 patch 0     [DISCONTINUED] topiramate (TOPAMAX) 25 MG tablet Take 1 tablet (25 mg) at bedtime for 1 week, then 1 tablet twice daily for 1 week, then 1 tablet in AM and 2 in PM for 1  week, then 2 tablets twice daily. 70 tablet 0       OBJECTIVE:                                                    /82  Pulse 99  Temp 97.7  F (36.5  C) (Oral)  Wt 155 lb 14.4 oz (70.7 kg)  LMP 10/29/2018  SpO2 98%  BMI 27.62 kg/m2 Body mass index is 27.62 kg/(m^2).   GENERAL: healthy, alert, well nourished, well hydrated, no distress  EYES: Eyes grossly normal to inspection, extraocular movements - intact, and PERRL  HENT: ear canals- normal; TMs- normal; Nose- normal; Mouth- no ulcers, no lesions  NECK: no tenderness, no adenopathy, no asymmetry, no masses, no stiffness; thyroid- normal to palpation  RESP: lungs clear to auscultation - no rales, no rhonchi, no wheezes  CV: regular rates and rhythm, normal S1 S2, no S3 or S4 and no murmur, no click or rub -  ABDOMEN: soft, no tenderness, no  hepatosplenomegaly, no masses, normal bowel sounds  MS: extremities- no gross deformities noted, no edema  SKIN: bilateral hands with some hyperpigmented areas of the palmar surface, especially in flexural areas.   NEURO: strength and tone- normal, sensory exam- grossly normal, mentation- intact, speech- normal, reflexes- symmetric  PSYCH: Alert and oriented times 3; speech- coherent , normal rate and volume; able to articulate logical thoughts, able to abstract reason, no tangential thoughts, no hallucinations or delusions, affect- normal    No results found for this or any previous visit (from the past 24 hour(s)).       ASSESSMENT/PLAN:                                                        ICD-10-CM    1. Benign essential hypertension I10 amLODIPine (NORVASC) 5 MG tablet   2. Other migraine without status migrainosus, intractable G43.819 topiramate (TOPAMAX) 100 MG tablet   3. Vitamin D deficiency E55.9 Vitamin D Deficiency   4. Other fatigue R53.83 CBC with platelets and differential     Ferritin     Vitamin B12     Comprehensive metabolic panel     TSH with free T4 reflex     Add amlopdipine for blood pressure.  "Continue Topamax daily. Add multivitamin as she is not getting any veggies in her diet. Check labs. Prioritize sleep, this will help with fatigue. Return to clinic for any new or worsening symptoms or go to ER Urgent care in off hours. Recheck 3-4 weeks. Schedule colposcopy.    Patient Instructions   [facility] [address]  [facility] [id number]    Start amlodipine for blood pressure   Continue to take blood pressure daily  Recheck 3-4 weeks    Continue topamax    Start multivitamin daily    vaseline to hands at night and cover with cotton gloves    Labs today    Prioritize 8 hours of sleep nightly  Return to clinic for any new or worsening symptoms or go to ER Urgent care in off hours          Estimated body mass index is 27.62 kg/(m^2) as calculated from the following:    Height as of 10/18/18: 5' 3\" (1.6 m).    Weight as of this encounter: 155 lb 14.4 oz (70.7 kg).       Patricia Brown  [facility] [place]    "

## 2018-10-31 LAB
ALBUMIN SERPL-MCNC: 3.9 G/DL (ref 3.4–5)
ALP SERPL-CCNC: 62 U/L (ref 40–150)
ALT SERPL W P-5'-P-CCNC: 21 U/L (ref 0–50)
ANION GAP SERPL CALCULATED.3IONS-SCNC: 10 MMOL/L (ref 3–14)
AST SERPL W P-5'-P-CCNC: 21 U/L (ref 0–45)
BILIRUB SERPL-MCNC: 0.3 MG/DL (ref 0.2–1.3)
BUN SERPL-MCNC: 8 MG/DL (ref 7–30)
CALCIUM SERPL-MCNC: 8.8 MG/DL (ref 8.5–10.1)
CHLORIDE SERPL-SCNC: 108 MMOL/L (ref 94–109)
CO2 SERPL-SCNC: 22 MMOL/L (ref 20–32)
CREAT SERPL-MCNC: 0.59 MG/DL (ref 0.52–1.04)
DEPRECATED CALCIDIOL+CALCIFEROL SERPL-MC: 13 UG/L (ref 20–75)
FERRITIN SERPL-MCNC: 20 NG/ML (ref 12–150)
GFR SERPL CREATININE-BSD FRML MDRD: >90 ML/MIN/1.7M2
GLUCOSE SERPL-MCNC: 77 MG/DL (ref 70–99)
POTASSIUM SERPL-SCNC: 3.6 MMOL/L (ref 3.4–5.3)
PROT SERPL-MCNC: 7.4 G/DL (ref 6.8–8.8)
SODIUM SERPL-SCNC: 140 MMOL/L (ref 133–144)
TSH SERPL DL<=0.005 MIU/L-ACNC: 0.57 MU/L (ref 0.4–4)

## 2018-12-21 ENCOUNTER — OFFICE VISIT (OUTPATIENT)
Dept: OBGYN | Facility: CLINIC | Age: 38
End: 2018-12-21
Payer: MEDICAID

## 2018-12-21 VITALS
BODY MASS INDEX: 28.63 KG/M2 | SYSTOLIC BLOOD PRESSURE: 145 MMHG | WEIGHT: 161.6 LBS | HEART RATE: 94 BPM | TEMPERATURE: 98.2 F | DIASTOLIC BLOOD PRESSURE: 85 MMHG

## 2018-12-21 DIAGNOSIS — R87.610 ASCUS WITH POSITIVE HIGH RISK HPV CERVICAL: Primary | ICD-10-CM

## 2018-12-21 DIAGNOSIS — R87.810 ASCUS WITH POSITIVE HIGH RISK HPV CERVICAL: Primary | ICD-10-CM

## 2018-12-21 LAB — BETA HCG QUAL IFA URINE: NEGATIVE

## 2018-12-21 PROCEDURE — 88341 IMHCHEM/IMCYTCHM EA ADD ANTB: CPT | Mod: 26 | Performed by: OBSTETRICS & GYNECOLOGY

## 2018-12-21 PROCEDURE — 57455 BIOPSY OF CERVIX W/SCOPE: CPT | Performed by: OBSTETRICS & GYNECOLOGY

## 2018-12-21 PROCEDURE — 88305 TISSUE EXAM BY PATHOLOGIST: CPT | Mod: 26 | Performed by: OBSTETRICS & GYNECOLOGY

## 2018-12-21 PROCEDURE — 99202 OFFICE O/P NEW SF 15 MIN: CPT | Mod: 25 | Performed by: OBSTETRICS & GYNECOLOGY

## 2018-12-21 PROCEDURE — 84703 CHORIONIC GONADOTROPIN ASSAY: CPT | Performed by: OBSTETRICS & GYNECOLOGY

## 2018-12-21 PROCEDURE — 88341 IMHCHEM/IMCYTCHM EA ADD ANTB: CPT | Performed by: OBSTETRICS & GYNECOLOGY

## 2018-12-21 PROCEDURE — 88342 IMHCHEM/IMCYTCHM 1ST ANTB: CPT | Mod: 26 | Performed by: OBSTETRICS & GYNECOLOGY

## 2018-12-21 PROCEDURE — 88342 IMHCHEM/IMCYTCHM 1ST ANTB: CPT | Performed by: OBSTETRICS & GYNECOLOGY

## 2018-12-21 PROCEDURE — 88305 TISSUE EXAM BY PATHOLOGIST: CPT | Performed by: OBSTETRICS & GYNECOLOGY

## 2018-12-21 NOTE — PATIENT INSTRUCTIONS

## 2018-12-21 NOTE — PROGRESS NOTES
Elizabeth Hinton is a 38 year old female No obstetric history on file. who presents for repeat colposcopy, referred by Patricia Brown. Pap smear on 10/18/18 showed: ASCUS and with high risk HPV present: non16/18. The prior pap is not available, but she reports she was in intermediate and had pap done 5/18 followed by colp.  She reports they told her she needed a LEEP, but she chose not to do it because it would have prolonged her incarceration.       Patient's last menstrual period was 11/28/2018 (approximate).  UPT today is negative  Patient does smoke  Type of contraception: none  Age at first sexual intercourse: 16  Number of sexual partners (lifetime): >6  Past GYN history: Chlamydia and Trichomonas  Prior cervical/vaginal disease: possibly PASTORA 2 or PASTORA 3 per patient report.  Prior cervical treatment: no treatment.      PROCEDURE:      Before the procedure, it was ensured that the patient was educated regarding the nature of her findings to date, the implications, and what was to be done. She has been made aware of the role of HPV, the natural history of infection, ways to minimize her future risk, the effect of HPV on the cervix, and treatment options available should they be indicated. The details of the colposcopic procedure were reviewed. All questions were answered before proceeding, and informed consent was therefore obtained.      Speculum placed in vagina and excellent visualization of cervix acheived, cervix swabbed x 3 with acetic acid solution.      FINDINGS:  Cervix: acetowhitening noted from 12-2:00 and punctation noted at 1:00, this trails into the endocervix. acetowhite at 8:00  Please refer to images section for details.  SCJ seen?: yes   ECC done?: No, patient refused ECC  Satisfactory examination?: yes      ASSESSMENT: HPV related changes.  PLAN: specimens labelled and sent to Pathology, will base further treatment on Pathology findings, treatment options discussed with patient and post biopsy  instructions given to patient      Mely De Luna MD      In addition to the procedure, 15 minutes of this 30 min visit were spent on face to face counseling regarding her abnormal pap, role of HPV, colposcopy and potential future management.

## 2018-12-21 NOTE — NURSING NOTE
"Chief Complaint   Patient presents with     Colposcopy       Initial /85   Pulse 94   Temp 98.2  F (36.8  C) (Oral)   Wt 73.3 kg (161 lb 9.6 oz)   LMP 11/28/2018 (Approximate)   BMI 28.63 kg/m   Estimated body mass index is 28.63 kg/m  as calculated from the following:    Height as of 10/18/18: 1.6 m (5' 3\").    Weight as of this encounter: 73.3 kg (161 lb 9.6 oz).  BP completed using cuff size: regular    Questioned patient about current smoking habits.  Pt. currently smokes.  Advised about smoking cessation.      No obstetric history on file.    The following HM Due: NONE      The following patient reported/Care Every where data was sent to:  P ABSTRACT QUALITY INITIATIVES [[id number]]        patient has appointment for today  Willow Lorenzo                "

## 2018-12-27 LAB — COPATH REPORT: NORMAL

## 2019-01-02 PROBLEM — R87.610 ASCUS WITH POSITIVE HIGH RISK HPV CERVICAL: Status: ACTIVE | Noted: 2018-10-18

## 2019-01-02 PROBLEM — R87.810 ASCUS WITH POSITIVE HIGH RISK HPV CERVICAL: Status: ACTIVE | Noted: 2018-10-18

## 2019-01-03 ENCOUNTER — OFFICE VISIT (OUTPATIENT)
Dept: FAMILY MEDICINE | Facility: CLINIC | Age: 39
End: 2019-01-03
Payer: COMMERCIAL

## 2019-01-03 VITALS
BODY MASS INDEX: 28.91 KG/M2 | WEIGHT: 163.2 LBS | OXYGEN SATURATION: 99 % | HEART RATE: 96 BPM | SYSTOLIC BLOOD PRESSURE: 132 MMHG | TEMPERATURE: 98 F | DIASTOLIC BLOOD PRESSURE: 84 MMHG

## 2019-01-03 DIAGNOSIS — B96.89 BACTERIAL VAGINITIS: ICD-10-CM

## 2019-01-03 DIAGNOSIS — R51.9 CHRONIC NONINTRACTABLE HEADACHE, UNSPECIFIED HEADACHE TYPE: ICD-10-CM

## 2019-01-03 DIAGNOSIS — L72.9 SUBCUTANEOUS CYST: Primary | ICD-10-CM

## 2019-01-03 DIAGNOSIS — L30.9 ECZEMA, UNSPECIFIED TYPE: ICD-10-CM

## 2019-01-03 DIAGNOSIS — R03.0 ELEVATED BP WITHOUT DIAGNOSIS OF HYPERTENSION: ICD-10-CM

## 2019-01-03 DIAGNOSIS — R63.5 WEIGHT GAIN: ICD-10-CM

## 2019-01-03 DIAGNOSIS — N76.0 BACTERIAL VAGINITIS: ICD-10-CM

## 2019-01-03 DIAGNOSIS — G89.29 CHRONIC NONINTRACTABLE HEADACHE, UNSPECIFIED HEADACHE TYPE: ICD-10-CM

## 2019-01-03 DIAGNOSIS — N89.8 VAGINAL ITCHING: ICD-10-CM

## 2019-01-03 LAB
SPECIMEN SOURCE: ABNORMAL
WET PREP SPEC: ABNORMAL

## 2019-01-03 PROCEDURE — 99214 OFFICE O/P EST MOD 30 MIN: CPT | Performed by: PHYSICIAN ASSISTANT

## 2019-01-03 PROCEDURE — 87210 SMEAR WET MOUNT SALINE/INK: CPT | Performed by: PHYSICIAN ASSISTANT

## 2019-01-03 RX ORDER — TRIAMCINOLONE ACETONIDE 1 MG/G
CREAM TOPICAL 2 TIMES DAILY
Qty: 15 G | Refills: 0 | Status: SHIPPED | OUTPATIENT
Start: 2019-01-03 | End: 2020-01-03

## 2019-01-03 RX ORDER — METRONIDAZOLE 7.5 MG/G
1 GEL VAGINAL 2 TIMES DAILY
Qty: 50 G | Refills: 0 | Status: SHIPPED | OUTPATIENT
Start: 2019-01-03 | End: 2019-01-08

## 2019-01-03 NOTE — PROGRESS NOTES
"  SUBJECTIVE:   Elizabeth Hinton is a 38 year old female who presents to clinic today for the following health issues:    Had a bacteria infection, but doesn't think it cleared    Vaginal Symptoms  Onset: A few weeks    Description:  Vaginal Discharge: white   Itching (Pruritis): YES  Burning sensation:  no   Odor: YES- slight    Accompanying Signs & Symptoms:  Pain with Urination: no   Abdominal Pain: no but does have side pains  Fever: no     History:   Sexually active: YES  New Partner: no   Possibility of Pregnancy:  No    Precipitating factors:   Recent Antibiotic Use: no     Alleviating factors:  Tylenol and Ibuprofen    Constitutional  -Patient feels like she has been gaining weight, she is unsure of the cause    Skin  -Patient has a pimple on her back that has pus coming out  -She first noticed it 1 month ago  -Patient reports that she has been \"breaking out\" on her neck, the area is itchy    Medications  -Patient has not been taking Amlodipine  -She has been taking Vitamin D and Iron    Problem list and histories reviewed & adjusted, as indicated.  Additional history: as documented    ROS:  CONSTITUTIONAL: NEGATIVE for fever, chills, POSITIVE change in weight  INTEGUMENTARY/SKIN: NEGATIVE for worrisome rashes, moles or lesions, see HPI above  EYES: NEGATIVE for vision changes or irritation  ENT/MOUTH: NEGATIVE for ear, mouth and throat problems  RESP: NEGATIVE for significant cough or SOB  BREAST: NEGATIVE for masses, tenderness or discharge  CV: NEGATIVE for chest pain, palpitations or peripheral edema  GI: NEGATIVE for nausea, abdominal pain, heartburn, or change in bowel habits  : NEGATIVE for frequency, dysuria, or hematuria  MUSCULOSKELETAL: NEGATIVE for significant arthralgias or myalgia  NEURO: NEGATIVE for weakness, dizziness or paresthesias  ENDOCRINE: NEGATIVE for temperature intolerance, skin/hair changes  HEME: NEGATIVE for bleeding problems  PSYCHIATRIC: NEGATIVE for changes in mood or " affect    This document serves as a record of the services and decisions personally performed and made by Patriica Brown PA-C. It was created on her behalf by Darell Motley, trained medical scribe. The creation of this document is based on the provider's statements to the medical scribe.  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    Social History     Tobacco Use     Smoking status: Current Every Day Smoker     Packs/day: 1.00     Smokeless tobacco: Never Used   Substance Use Topics     Alcohol use: No     Family History   Problem Relation Age of Onset     Thyroid Disease Mother      Heart Disease Mother      Aneurysm Father            Labs reviewed in EPIC  Patient Active Problem List   Diagnosis     Elevated BP without diagnosis of hypertension     ASCUS with positive high risk HPV cervical     No past surgical history on file.    Social History     Tobacco Use     Smoking status: Current Every Day Smoker     Packs/day: 1.00     Smokeless tobacco: Never Used   Substance Use Topics     Alcohol use: No     Family History   Problem Relation Age of Onset     Thyroid Disease Mother      Heart Disease Mother      Aneurysm Father          Current Outpatient Medications   Medication Sig Dispense Refill     order for DME Equipment being ordered: blood pressure machine 1 each 0     topiramate (TOPAMAX) 100 MG tablet Take 1 tablet (100 mg) by mouth daily 60 tablet 1     triamcinolone (KENALOG) 0.1 % external cream Apply topically 2 times daily For no longer than 14 days 15 g 0     amLODIPine (NORVASC) 5 MG tablet Take 1 tablet (5 mg) by mouth daily (Patient not taking: Reported on 1/3/2019) 30 tablet 1     nicotine (NICODERM CQ) 14 MG/24HR 24 hr patch Place 1 patch onto the skin every 24 hours For 6 weeks, then drop to 7 mg/d (Patient not taking:  Reported on 10/30/2018) 45 patch 0     nicotine (NICODERM CQ) 21 MG/24HR 24 hr patch Place 1 patch onto the skin every 24 hours For 6 weeks, then drop to 14mg/d (Patient not taking: Reported on 10/30/2018) 45 patch 0     nicotine (NICODERM CQ) 7 MG/24HR 24 hr patch Place 1 patch onto the skin every 24 hours For 2 weeks (Patient not taking: Reported on 10/30/2018) 14 patch 0       OBJECTIVE:                                                    /84   Pulse 96   Temp 98  F (36.7  C) (Temporal)   Wt 74 kg (163 lb 3.2 oz)   SpO2 99%   BMI 28.91 kg/m   Body mass index is 28.91 kg/m .   GENERAL:: healthy, alert and no distress  SKIN: 5-7 mm area of patchy hyperpigmented scaly rash on left neck, 5 mm subcutaneous mass with well defined edges, freely mobile, clear whitish discharge  NEURO: strength and tone- normal, sensory exam- grossly normal, mentation- intact, speech- normal  PSYCH: Alert and oriented times 3; speech- coherent , normal rate and volume; able to articulate logical thoughts, able to abstract reason, no tangential thoughts, no hallucinations or delusions, affect- normal    Results for orders placed or performed in visit on 01/03/19 (from the past 24 hour(s))   Wet prep   Result Value Ref Range    Specimen Description Vagina     Wet Prep Clue cells seen (A)     Wet Prep No Trichomonas seen     Wet Prep No yeast seen           ASSESSMENT/PLAN:                                                        ICD-10-CM    1. Subcutaneous cyst L72.9 DERMATOLOGY REFERRAL   2. Weight gain R63.5 BARIATRIC ADULT REFERRAL   3. Eczema, unspecified type L30.9 triamcinolone (KENALOG) 0.1 % external cream   4. Elevated BP without diagnosis of hypertension R03.0    5. Vaginal itching N89.8 Wet prep   6. Chronic nonintractable headache, unspecified headache type R51 NEUROLOGY ADULT REFERRAL     Follow up with derm for cyst removal. Unclear if weight gain is from eating too many calories. See below. She wants to see weight  management for medical management of weight anyway so referral placed. Start amlodipine. Follow up with neurology for ongoing headaches despite topamax. Metronidazole for bv. Return to clinic for any new or worsening symptoms or go to ER Urgent care in off hours      Patient Instructions   Try [employer] and record your food for 2 weeks  Then come back to see me to go over this  Follow up with dermatology for your lump on your back  Use cream as directed for your rash. Follow handout below to prevent eczema  Follow up with weight clinic  Start your blood pressure medication  Return to clinic for any new or worsening symptoms or go to ER Urgent care in off hours      Patient Education     Atopic Dermatitis (Adult)  Atopic dermatitis is a dry, itchy, red rash. It s also called eczema. The rash is chronic, or ongoing. It can come and go over time. The disease is often passed down in families. It causes a problem with the skin barrier that makes the skin more sensitive to the environment and other factors. The increased skin sensitivity causes an itch, which causes scratching. Scratching can worsen the itching or also break the skin. This can put the skin at risk of infection.  The condition is most common in people with asthma, hay fever, hives, or dry or sensitive skin. The rash may be caused by extreme heat or heavy sweating. Skin irritants can cause the rash to flare up. These can include wool or silk clothing, grease, oils, some medicines, and harsh soaps and detergents. Emotional stress can also be a trigger.  Treatment is done to relieve the itching and inflammation of the skin.  Home care  Follow these tips to care for your condition:    Keep the areas of rash clean by bathing at least every other day. Use lukewarm water to bathe. Don t use hot water, which can dry out the skin.    Don t use soaps with strong detergents. Use mild soaps made for sensitive skin.    Apply a cream or ointment to damp skin right  "after bathing.    Avoid things that irritate your skin. Wear absorbent, soft fabrics next to the skin rather than rough or scratchy materials.    Use mild laundry soap free of scents and perfumes. Make sure to rinse all the soap out of your clothes.    Treat any skin infection as directed.    Use oral diphenhydramine to help reduce itching. This is an antihistamine you can buy at drug and grocery stores. It can make you sleepy, so use lower doses during the daytime. Or you can use loratadine. This is an antihistamine that will not make you sleepy. Do not use diphenhydramine if you have glaucoma or have trouble urinating due to an enlarged prostate.  Follow-up care  See your healthcare provider, or as advised. If your symptoms don t get better or if they get worse in the next 7 days, make an appointment with your healthcare provider.  When to seek medical advice  Call your healthcare provider right away  if any of these occur:    Increasing area of redness or pain in the skin    Yellow crusts or wet drainage from the rash    Fever of 100.4 F (38 C) or higher, or as directed by your healthcare provider  Date Last Reviewed: 9/1/2016 2000-2018 [employer]. [address] [place] [address], [place] [address]. All rights reserved. This information is not intended as a substitute for professional medical care. Always follow your healthcare professional's instructions.                 Estimated body mass index is 28.91 kg/m  as calculated from the following:    Height as of 10/18/18: 1.6 m (5' 3\").    Weight as of this encounter: 74 kg (163 lb 3.2 oz).     The information in this document, created by the medical scribe for me, accurately reflects the services I personally performed and the decisions made by me. I have reviewed and approved this document for accuracy prior to leaving the patient care area.  January 3, 2019 9:22 AM  Patricia Brown  [facility] [place]    "

## 2019-01-03 NOTE — PATIENT INSTRUCTIONS
Try [employer]itness[employer] and record your food for 2 weeks  Then come back to see me to go over this  Follow up with dermatology for your lump on your back  Use cream as directed for your rash. Follow handout below to prevent eczema  Follow up with weight clinic  Start your blood pressure medication  Return to clinic for any new or worsening symptoms or go to ER Urgent care in off hours      Patient Education     Atopic Dermatitis (Adult)  Atopic dermatitis is a dry, itchy, red rash. It s also called eczema. The rash is chronic, or ongoing. It can come and go over time. The disease is often passed down in families. It causes a problem with the skin barrier that makes the skin more sensitive to the environment and other factors. The increased skin sensitivity causes an itch, which causes scratching. Scratching can worsen the itching or also break the skin. This can put the skin at risk of infection.  The condition is most common in people with asthma, hay fever, hives, or dry or sensitive skin. The rash may be caused by extreme heat or heavy sweating. Skin irritants can cause the rash to flare up. These can include wool or silk clothing, grease, oils, some medicines, and harsh soaps and detergents. Emotional stress can also be a trigger.  Treatment is done to relieve the itching and inflammation of the skin.  Home care  Follow these tips to care for your condition:    Keep the areas of rash clean by bathing at least every other day. Use lukewarm water to bathe. Don t use hot water, which can dry out the skin.    Don t use soaps with strong detergents. Use mild soaps made for sensitive skin.    Apply a cream or ointment to damp skin right after bathing.    Avoid things that irritate your skin. Wear absorbent, soft fabrics next to the skin rather than rough or scratchy materials.    Use mild laundry soap free of scents and perfumes. Make sure to rinse all the soap out of your clothes.    Treat any skin infection as  directed.    Use oral diphenhydramine to help reduce itching. This is an antihistamine you can buy at drug and grocery stores. It can make you sleepy, so use lower doses during the daytime. Or you can use loratadine. This is an antihistamine that will not make you sleepy. Do not use diphenhydramine if you have glaucoma or have trouble urinating due to an enlarged prostate.  Follow-up care  See your healthcare provider, or as advised. If your symptoms don t get better or if they get worse in the next 7 days, make an appointment with your healthcare provider.  When to seek medical advice  Call your healthcare provider right away  if any of these occur:    Increasing area of redness or pain in the skin    Yellow crusts or wet drainage from the rash    Fever of 100.4 F (38 C) or higher, or as directed by your healthcare provider  Date Last Reviewed: 9/1/2016 2000-2018 [employer]. [address] [place] [address], [place] [address]. All rights reserved. This information is not intended as a substitute for professional medical care. Always follow your healthcare professional's instructions.

## 2019-01-15 ENCOUNTER — APPOINTMENT (OUTPATIENT)
Dept: GENERAL RADIOLOGY | Facility: CLINIC | Age: 39
End: 2019-01-15
Payer: COMMERCIAL

## 2019-01-15 ENCOUNTER — HOSPITAL ENCOUNTER (EMERGENCY)
Facility: CLINIC | Age: 39
Discharge: HOME | End: 2019-01-15
Attending: EMERGENCY MEDICINE | Admitting: EMERGENCY MEDICINE
Payer: COMMERCIAL

## 2019-01-15 VITALS
WEIGHT: 164 LBS | TEMPERATURE: 98.4 F | RESPIRATION RATE: 16 BRPM | BODY MASS INDEX: 29.05 KG/M2 | SYSTOLIC BLOOD PRESSURE: 113 MMHG | DIASTOLIC BLOOD PRESSURE: 69 MMHG | HEART RATE: 81 BPM | OXYGEN SATURATION: 98 %

## 2019-01-15 DIAGNOSIS — M54.9 UPPER BACK PAIN ON RIGHT SIDE: ICD-10-CM

## 2019-01-15 PROCEDURE — 71046 X-RAY EXAM CHEST 2 VIEWS: CPT

## 2019-01-15 PROCEDURE — 99283 EMERGENCY DEPT VISIT LOW MDM: CPT | Performed by: EMERGENCY MEDICINE

## 2019-01-15 PROCEDURE — 99284 EMERGENCY DEPT VISIT MOD MDM: CPT | Mod: Z6 | Performed by: EMERGENCY MEDICINE

## 2019-01-15 RX ORDER — CYCLOBENZAPRINE HCL 10 MG
10 TABLET ORAL 3 TIMES DAILY PRN
Qty: 20 TABLET | Refills: 0 | Status: SHIPPED | OUTPATIENT
Start: 2019-01-15 | End: 2019-01-21

## 2019-01-15 ASSESSMENT — ENCOUNTER SYMPTOMS
FEVER: 0
SHORTNESS OF BREATH: 1
BACK PAIN: 1
COUGH: 1

## 2019-01-15 NOTE — ED PROVIDER NOTES
[facility] EMERGENCY DEPARTMENT ([facility])    1/15/19       History     Chief Complaint   Patient presents with     Back Pain     Upper back pain with coughing and with movement, it has been going on for two weeks.     The history is provided by the patient.     Elizabeth Hinton is a 38 year old female with a medical history significant for hypertension who presents to the Emergency Department for evaluation of upper, mid back pain for the past 2 weeks.  She reports having an associated cough and shortness of breath as well.  She denies any fevers.  She states the pain is exacerbated with sneezing, coughing, twisting her torso and bending over.  She denies any exacerbation of the pain with movement of the arms, eating or palpating the area.  She feels that the pain is deep and she reports the pain is constant, but has not been worsening or improving since it started.  Patient has been taking aspirin, tylenol and ibuprofen; however, she has not had any improvement of the pain with these medications.  Patient is concerned for a pneumonia.  She denies any leg swelling, denies being on any birth control, denies any recent prolonged periods of immobilization and denies any history of DVT/PE.  She denies any chance of pregnancy and she denies any recent pregnancies.    I have reviewed the Medications, Allergies, Past Medical and Surgical History, and Social History in the [facility] system.    Past Medical History:   Diagnosis Date     Abnormal Pap smear of cervix 10/18/2018    10/18/18: See problem list.      Abnormal Pap smear of vagina      Cervical high risk HPV (human papillomavirus) test positive 10/18/2018    10/18/18: See problem list.      Hypertension      Migraine        History reviewed. No pertinent surgical history.    Family History   Problem Relation Age of Onset     Thyroid Disease Mother      Heart Disease Mother      Aneurysm Father        Social History     Tobacco Use     Smoking status: Current  Every Day Smoker     Packs/day: 1.00     Smokeless tobacco: Never Used   Substance Use Topics     Alcohol use: No       No current facility-administered medications for this encounter.      Current Outpatient Medications   Medication     amLODIPine (NORVASC) 5 MG tablet     cyclobenzaprine (FLEXERIL) 10 MG tablet     order for DME     topiramate (TOPAMAX) 100 MG tablet     triamcinolone (KENALOG) 0.1 % external cream     nicotine (NICODERM CQ) 14 MG/24HR 24 hr patch     nicotine (NICODERM CQ) 21 MG/24HR 24 hr patch     nicotine (NICODERM CQ) 7 MG/24HR 24 hr patch      No Known Allergies      Review of Systems   Constitutional: Negative for fever.   Respiratory: Positive for cough and shortness of breath.    Cardiovascular: Negative for leg swelling.   Musculoskeletal: Positive for back pain (upper).   All other systems reviewed and are negative.      Physical Exam     ED Triage Vitals [01/15/19 1801]   Enc Vitals Group      /85      Pulse 88      Resp 16      Temp 98  F (36.7  C)      Temp src Oral      SpO2 98 %      Weight 74.4 kg (164 lb)       Physical Exam   Constitutional: She is oriented to person, place, and time. Vital signs are normal. She appears well-developed and well-nourished.  Non-toxic appearance. She does not appear ill. No distress.   HENT:   Head: Normocephalic and atraumatic.   Mouth/Throat: Oropharynx is clear and moist. No oropharyngeal exudate.   Eyes: Conjunctivae and EOM are normal. Pupils are equal, round, and reactive to light. No scleral icterus.   Neck: Normal range of motion. Neck supple. No JVD present. No tracheal deviation present. No thyromegaly present.   Cardiovascular: Normal rate, regular rhythm, normal heart sounds and intact distal pulses. Exam reveals no gallop and no friction rub.   No murmur heard.  Pulmonary/Chest: Effort normal and breath sounds normal. No respiratory distress.           Abdominal: Soft. Bowel sounds are normal. She exhibits no distension and no  mass. There is no tenderness.   Musculoskeletal: Normal range of motion. She exhibits no edema or tenderness.   Lymphadenopathy:     She has no cervical adenopathy.   Neurological: She is alert and oriented to person, place, and time. She has normal strength. No cranial nerve deficit or sensory deficit.   Skin: Skin is warm and dry. No rash noted. No erythema. No pallor.   Psychiatric: She has a normal mood and affect. Her behavior is normal.   Nursing note and vitals reviewed.      ED Course   5:57 PM  The patient was seen and examined by Alan Canela MD in Room ED04.        Procedures        Results for orders placed or performed during the hospital encounter of 01/15/19   XR Chest 2 Views    Narrative    CHEST TWO VIEW 1/15/2019 6:22 PM     COMPARISON: None    HISTORY: Right upper back pain.    FINDINGS: The cardiac silhouette, pulmonary vasculature, lungs and  pleural spaces are within normal limits.      Impression    IMPRESSION: Clear lungs.    JOSY CHEN MD            Assessments & Plan (with Medical Decision Making)     The patient presented to the emergency department with upper thoracic back pain.  The pain does seem to have a mild pleuritic component as well as a movement base component.  She has no risk factors for PE and no concerning findings on physical exam or history that would lead me to believe that her pain is secondary to this.  She is P E R C negative I did not feel that workup for pulmonary embolism was warranted.  Likewise, presentation raises no red flags for the possibility of aortic dissection.  No signs of pneumonia, pneumothorax, pleural effusion or cardiomegaly on CXR.  Abdominal exam is benign decreasing suspicion for referred pain from an intra-abdominal source.  At this point, I suspect musculoskeletal origin I am comfortable discharging her to follow-up with her primary care physician or return to the emergency room for worsening.    I have reviewed the nursing notes.    I  have reviewed the findings, diagnosis, plan and need for follow up with the patient.       Medication List      Started    cyclobenzaprine 10 MG tablet  Commonly known as:  FLEXERIL  10 mg, Oral, 3 TIMES DAILY PRN            Final diagnoses:   Upper back pain on right side     IWallace, am serving as a trained medical scribe to document services personally performed by Carlitos Canela MD, based on the provider's statements to me.   ICarlitos, was physically present and have reviewed and verified the accuracy of this note documented by Wallace Limon.    1/15/2019   [facility], [place], EMERGENCY DEPARTMENT     Alan Canela MD  01/17/19 0240

## 2019-01-15 NOTE — ED AVS SNAPSHOT
[facility], Emergency Department  [address]  [facility] [address]  Phone:  [phone]  Fax:  [phone]                                    Elizabeth Hinton   MRN: [id number]    Department:  [facility], Emergency Department   Date of Visit:  1/15/2019           After Visit Summary Signature Page    I have received my discharge instructions, and my questions have been answered. I have discussed any challenges I see with this plan with the nurse or doctor.    ..........................................................................................................................................  Patient/Patient Representative Signature      ..........................................................................................................................................  Patient Representative Print Name and Relationship to Patient    ..................................................               ................................................  Date                                   Time    ..........................................................................................................................................  Reviewed by Signature/Title    ...................................................              ..............................................  Date                                               Time          22EPIC Rev 08/18

## 2019-01-16 NOTE — DISCHARGE INSTRUCTIONS
Please make an appointment to follow up with Your Primary Care Provider if not improving.    Tylenol and/or ibuprofen for pain.    Flexeril, as directed, if needed for pain/spasm.    Try over-the-counter lidocaine patches.    Return to the emergency department for any problems.

## 2019-01-28 ENCOUNTER — TELEPHONE (OUTPATIENT)
Dept: FAMILY MEDICINE | Facility: CLINIC | Age: 39
End: 2019-01-28

## 2019-01-28 DIAGNOSIS — B96.89 BACTERIAL VAGINITIS: ICD-10-CM

## 2019-01-28 DIAGNOSIS — R21 RASH AND NONSPECIFIC SKIN ERUPTION: Primary | ICD-10-CM

## 2019-01-28 DIAGNOSIS — N76.0 BACTERIAL VAGINITIS: ICD-10-CM

## 2019-01-28 RX ORDER — PRENATAL VIT 91/IRON/FOLIC/DHA 28-975-200
COMBINATION PACKAGE (EA) ORAL 2 TIMES DAILY
Qty: 30 G | Refills: 1 | Status: SHIPPED | OUTPATIENT
Start: 2019-01-28 | End: 2020-01-28

## 2019-01-28 RX ORDER — METRONIDAZOLE 500 MG/1
500 TABLET ORAL 2 TIMES DAILY
Qty: 14 TABLET | Refills: 0 | Status: CANCELLED | OUTPATIENT
Start: 2019-01-28 | End: 2019-02-04

## 2019-01-28 NOTE — TELEPHONE ENCOUNTER
Chanelle/Provider Jairo,  See phone message below    Per chart review, patient seen on 01/03/2019 and started on triamcinolone (KENALOG) 0.1 % external cream for Eczema, unspecified type [L30.9]     Patient requesting alternate type of medication    Pharmacy cyed    Please review/sign or advise    Thank You!  Kendra Baig, RN  Triage Nurse

## 2019-01-28 NOTE — TELEPHONE ENCOUNTER
Called patient. Notified of message from provider: She was put on metrogel 1 month ago for BV if she feels like she has symptoms a month later she needs OV/ WET prep etc (Routing comment)    Pt scheduled OV for 01/30/19.    Mili Kirkland RN  [facility]

## 2019-01-28 NOTE — TELEPHONE ENCOUNTER
Chanelle/Provider Pool:     Called patient to notify of message. Pt stated that she was looking for a oral alternative to metrogel. Pt states that her symptoms initally improved after using the metrogel, but now have returned. Notified pt that request will be sent to provider, but may require OV.     Mili Kirkland RN  [facility]

## 2019-01-28 NOTE — TELEPHONE ENCOUNTER
Pt states the triamcinolone (KENALOG) 0.1 % external cream did not help resolve her sx, pt is requesting a different medication. Pharmacy queued.    Pt can be reached @ [phone] s[place]

## 2019-01-28 NOTE — TELEPHONE ENCOUNTER
Please call patient  It iks unusual for eczema not to respond to a steroid cream   So I would have he BOTH   1) try an antifungal cream bid/ cover with Vaseline  And ]  2) get a derm appointment in case this does not work

## 2019-01-30 ENCOUNTER — OFFICE VISIT (OUTPATIENT)
Dept: FAMILY MEDICINE | Facility: CLINIC | Age: 39
End: 2019-01-30
Payer: COMMERCIAL

## 2019-01-30 VITALS
SYSTOLIC BLOOD PRESSURE: 138 MMHG | OXYGEN SATURATION: 97 % | HEIGHT: 63 IN | BODY MASS INDEX: 27.94 KG/M2 | HEART RATE: 60 BPM | TEMPERATURE: 98.2 F | WEIGHT: 157.7 LBS | DIASTOLIC BLOOD PRESSURE: 82 MMHG

## 2019-01-30 DIAGNOSIS — R31.9 URINARY TRACT INFECTION WITH HEMATURIA, SITE UNSPECIFIED: ICD-10-CM

## 2019-01-30 DIAGNOSIS — L30.9 ECZEMA, UNSPECIFIED TYPE: ICD-10-CM

## 2019-01-30 DIAGNOSIS — N39.0 URINARY TRACT INFECTION WITH HEMATURIA, SITE UNSPECIFIED: ICD-10-CM

## 2019-01-30 DIAGNOSIS — R10.2 VAGINAL PAIN: ICD-10-CM

## 2019-01-30 DIAGNOSIS — M54.9 ACUTE UPPER BACK PAIN: ICD-10-CM

## 2019-01-30 DIAGNOSIS — R82.90 NONSPECIFIC FINDING ON EXAMINATION OF URINE: ICD-10-CM

## 2019-01-30 DIAGNOSIS — M54.2 CERVICALGIA: ICD-10-CM

## 2019-01-30 DIAGNOSIS — N89.8 VAGINAL ITCHING: Primary | ICD-10-CM

## 2019-01-30 LAB
ALBUMIN UR-MCNC: ABNORMAL MG/DL
APPEARANCE UR: CLEAR
BACTERIA #/AREA URNS HPF: ABNORMAL /HPF
BILIRUB UR QL STRIP: NEGATIVE
COLOR UR AUTO: YELLOW
GLUCOSE UR STRIP-MCNC: NEGATIVE MG/DL
HGB UR QL STRIP: ABNORMAL
KETONES UR STRIP-MCNC: ABNORMAL MG/DL
LEUKOCYTE ESTERASE UR QL STRIP: ABNORMAL
NITRATE UR QL: NEGATIVE
NON-SQ EPI CELLS #/AREA URNS LPF: ABNORMAL /LPF
PH UR STRIP: 6.5 PH (ref 5–7)
RBC #/AREA URNS AUTO: ABNORMAL /HPF
SOURCE: ABNORMAL
SP GR UR STRIP: 1.01 (ref 1–1.03)
SPECIMEN SOURCE: NORMAL
UROBILINOGEN UR STRIP-ACNC: 0.2 EU/DL (ref 0.2–1)
WBC #/AREA URNS AUTO: ABNORMAL /HPF
WET PREP SPEC: NORMAL

## 2019-01-30 PROCEDURE — 87186 SC STD MICRODIL/AGAR DIL: CPT | Performed by: NURSE PRACTITIONER

## 2019-01-30 PROCEDURE — 87086 URINE CULTURE/COLONY COUNT: CPT | Performed by: NURSE PRACTITIONER

## 2019-01-30 PROCEDURE — 99214 OFFICE O/P EST MOD 30 MIN: CPT | Performed by: NURSE PRACTITIONER

## 2019-01-30 PROCEDURE — 81001 URINALYSIS AUTO W/SCOPE: CPT | Performed by: NURSE PRACTITIONER

## 2019-01-30 PROCEDURE — 87088 URINE BACTERIA CULTURE: CPT | Performed by: NURSE PRACTITIONER

## 2019-01-30 PROCEDURE — 87210 SMEAR WET MOUNT SALINE/INK: CPT | Performed by: NURSE PRACTITIONER

## 2019-01-30 RX ORDER — FLUCONAZOLE 150 MG/1
TABLET ORAL
Qty: 2 TABLET | Refills: 0 | Status: SHIPPED | OUTPATIENT
Start: 2019-01-30 | End: 2019-12-09

## 2019-01-30 RX ORDER — SULFAMETHOXAZOLE/TRIMETHOPRIM 800-160 MG
1 TABLET ORAL 2 TIMES DAILY
Qty: 6 TABLET | Refills: 0 | Status: SHIPPED | OUTPATIENT
Start: 2019-01-30 | End: 2019-02-02

## 2019-01-30 RX ORDER — IBUPROFEN 600 MG/1
600 TABLET, FILM COATED ORAL EVERY 8 HOURS PRN
Qty: 90 TABLET | Refills: 1 | Status: SHIPPED | OUTPATIENT
Start: 2019-01-30 | End: 2019-06-13

## 2019-01-30 RX ORDER — MOMETASONE FUROATE 1 MG/G
CREAM TOPICAL DAILY
Qty: 45 G | Refills: 3 | Status: SHIPPED | OUTPATIENT
Start: 2019-01-30 | End: 2020-01-30

## 2019-01-30 ASSESSMENT — MIFFLIN-ST. JEOR: SCORE: 1380.57

## 2019-01-30 NOTE — PROGRESS NOTES
SUBJECTIVE:   Elizabeth Hinton is a 38 year old female who presents to clinic today for the following health issues:    Vaginal Symptoms  Onset: about 1 week     Description:  Vaginal Discharge: white   Itching (Pruritis): no   Burning sensation:  YES- weird sensation at the end of urination. Bleeding with it.   Odor: YES- slight     Accompanying Signs & Symptoms:  Pain with Urination: YES, on the right side has pain too.  Abdominal Pain: no   Fever: no     History:   Sexually active: YES  New Partner: no   Possibility of Pregnancy:  No    Precipitating factors:   Recent Antibiotic Use: no     Alleviating factors:  none    Therapies Tried and outcome: tylenol and advil, cranberry pills.     Neck/upper back Pain    Onset: Years    Description:   Location: back of neck and upper back  Character: Dull ache    Intensity: moderate    Progression of Symptoms: intermittent    Accompanying Signs & Symptoms:  Other symptoms: occasional tingling in fingertips of both hands    History:   Previous similar pain: YES      Precipitating factors:   Trauma or overuse: YES, has worked as a cook    Alleviating factors:  Improved by: rest/inactivity and massage    Therapies Tried and outcome: none        Problem list and histories reviewed & adjusted, as indicated.  Additional history: as documented    Patient Active Problem List   Diagnosis     Elevated BP without diagnosis of hypertension     ASCUS with positive high risk HPV cervical     History reviewed. No pertinent surgical history.    Social History     Tobacco Use     Smoking status: Current Every Day Smoker     Packs/day: 1.00     Smokeless tobacco: Never Used   Substance Use Topics     Alcohol use: No     Family History   Problem Relation Age of Onset     Thyroid Disease Mother      Heart Disease Mother      Aneurysm Father            Reviewed and updated as needed this visit by clinical staff       Reviewed and updated as needed this visit by Provider      "    ROS:  Constitutional, HEENT, cardiovascular, pulmonary, GI, , musculoskeletal, neuro, skin, endocrine and psych systems are negative, except as otherwise noted.    OBJECTIVE:     /82   Pulse 60   Temp 98.2  F (36.8  C) (Oral)   Ht 1.61 m (5' 3.39\")   Wt 71.5 kg (157 lb 11.2 oz)   LMP 01/05/2019 (Exact Date)   SpO2 97%   BMI 27.60 kg/m    Body mass index is 27.6 kg/m .   GENERAL: healthy, alert and no distress  NECK: no adenopathy, no asymmetry, masses, or scars and thyroid normal to palpation  RESP: lungs clear to auscultation - no rales, rhonchi or wheezes  CV: regular rate and rhythm, normal S1 S2, no S3 or S4, no murmur, click or rub, no peripheral edema and peripheral pulses strong  ABDOMEN: soft, nontender, no hepatosplenomegaly, no masses and bowel sounds normal  MS: neck exam shows normal strength, no torticollis, ROM is normal and tender and tight in paracervical muscles and spine exam shows no scoliosis, ROM is normal and tender in parathoracic muscles; bilateral upper extremities normal ROM; negative tinels and phalens  NEURO: Normal strength and tone, mentation intact and speech normal  PSYCH: mentation appears normal, affect normal/bright    Diagnostic Test Results:  No results found for this or any previous visit (from the past 24 hour(s)).    ASSESSMENT/PLAN:     Problem List Items Addressed This Visit     None      Visit Diagnoses     Vaginal itching    -  Primary    Relevant Medications    mometasone (ELOCON) 0.1 % external cream    Other Relevant Orders    Wet prep (Completed)    Urine Microscopic (Completed)    Vaginal pain        Relevant Orders    *UA reflex to Microscopic and Culture ([facility] (except [place] and [place]) (Completed)    Cervicalgia        Relevant Medications    ibuprofen (ADVIL/MOTRIN) 600 MG tablet    Other Relevant Orders    PHYSICAL THERAPY REFERRAL    Acute upper back pain        Relevant Medications    ibuprofen (ADVIL/MOTRIN) 600 " MG tablet    Other Relevant Orders    PHYSICAL THERAPY REFERRAL    Eczema, unspecified type        Relevant Medications    mometasone (ELOCON) 0.1 % external cream    Urinary tract infection with hematuria, site unspecified        Relevant Medications    sulfamethoxazole-trimethoprim (BACTRIM DS/SEPTRA DS) 800-160 MG tablet    fluconazole (DIFLUCAN) 150 MG tablet    Nonspecific finding on examination of urine        Relevant Orders    Urine Culture Aerobic Bacterial (Completed)           XIN Sanders CNP  [facility] [place]  Please note greater than 50% of this 30 minute appointment were spent in face to face counseling with the patient of the issues described above in the history of present illness and in the plan, including referrals and education

## 2019-02-01 LAB
BACTERIA SPEC CULT: ABNORMAL
SPECIMEN SOURCE: ABNORMAL

## 2019-02-28 ENCOUNTER — OFFICE VISIT (OUTPATIENT)
Dept: FAMILY MEDICINE | Facility: CLINIC | Age: 39
End: 2019-02-28
Payer: COMMERCIAL

## 2019-02-28 VITALS
DIASTOLIC BLOOD PRESSURE: 86 MMHG | TEMPERATURE: 99.2 F | WEIGHT: 159.7 LBS | HEART RATE: 86 BPM | BODY MASS INDEX: 27.95 KG/M2 | SYSTOLIC BLOOD PRESSURE: 128 MMHG | RESPIRATION RATE: 12 BRPM | OXYGEN SATURATION: 98 %

## 2019-02-28 DIAGNOSIS — B96.89 BV (BACTERIAL VAGINOSIS): ICD-10-CM

## 2019-02-28 DIAGNOSIS — E61.1 IRON DEFICIENCY: ICD-10-CM

## 2019-02-28 DIAGNOSIS — N39.3 FEMALE STRESS INCONTINENCE: ICD-10-CM

## 2019-02-28 DIAGNOSIS — Z11.3 SCREENING FOR GONORRHEA: ICD-10-CM

## 2019-02-28 DIAGNOSIS — E55.9 VITAMIN D DEFICIENCY: ICD-10-CM

## 2019-02-28 DIAGNOSIS — L30.9 ECZEMA, UNSPECIFIED TYPE: ICD-10-CM

## 2019-02-28 DIAGNOSIS — F40.10 SOCIAL ANXIETY DISORDER: ICD-10-CM

## 2019-02-28 DIAGNOSIS — N76.0 BV (BACTERIAL VAGINOSIS): ICD-10-CM

## 2019-02-28 DIAGNOSIS — G43.819 OTHER MIGRAINE WITHOUT STATUS MIGRAINOSUS, INTRACTABLE: ICD-10-CM

## 2019-02-28 DIAGNOSIS — N89.8 VAGINAL ITCHING: Primary | ICD-10-CM

## 2019-02-28 LAB
SPECIMEN SOURCE: ABNORMAL
WET PREP SPEC: ABNORMAL

## 2019-02-28 PROCEDURE — 87210 SMEAR WET MOUNT SALINE/INK: CPT | Performed by: NURSE PRACTITIONER

## 2019-02-28 PROCEDURE — 99214 OFFICE O/P EST MOD 30 MIN: CPT | Performed by: NURSE PRACTITIONER

## 2019-02-28 RX ORDER — TOPIRAMATE 100 MG/1
100 TABLET, FILM COATED ORAL DAILY
Qty: 90 TABLET | Refills: 3 | Status: SHIPPED | OUTPATIENT
Start: 2019-02-28 | End: 2019-12-09

## 2019-02-28 RX ORDER — FLUCONAZOLE 150 MG/1
TABLET ORAL
Qty: 2 TABLET | Refills: 0 | Status: SHIPPED | OUTPATIENT
Start: 2019-02-28 | End: 2019-12-09

## 2019-02-28 RX ORDER — PROPRANOLOL HYDROCHLORIDE 10 MG/1
10 TABLET ORAL 2 TIMES DAILY
Qty: 60 TABLET | Refills: 1 | Status: SHIPPED | OUTPATIENT
Start: 2019-02-28 | End: 2019-06-13

## 2019-02-28 RX ORDER — FERROUS GLUCONATE 324(38)MG
324 TABLET ORAL
Qty: 90 TABLET | Refills: 3 | Status: SHIPPED | OUTPATIENT
Start: 2019-02-28 | End: 2019-12-09

## 2019-02-28 RX ORDER — TOPIRAMATE 50 MG/1
TABLET, FILM COATED ORAL
Qty: 90 TABLET | Refills: 3 | Status: SHIPPED | OUTPATIENT
Start: 2019-02-28 | End: 2019-12-09

## 2019-02-28 RX ORDER — CHOLECALCIFEROL (VITAMIN D3) 50 MCG
2 TABLET ORAL DAILY
Qty: 180 TABLET | Refills: 3 | Status: SHIPPED | OUTPATIENT
Start: 2019-02-28 | End: 2020-04-15

## 2019-02-28 RX ORDER — METRONIDAZOLE 500 MG/1
500 TABLET ORAL 2 TIMES DAILY
Qty: 14 TABLET | Refills: 0 | Status: SHIPPED | OUTPATIENT
Start: 2019-02-28 | End: 2019-03-07

## 2019-02-28 RX ORDER — TRIAMCINOLONE ACETONIDE 5 MG/G
CREAM TOPICAL 2 TIMES DAILY
Qty: 45 G | Refills: 1 | Status: SHIPPED | OUTPATIENT
Start: 2019-02-28 | End: 2021-11-12

## 2019-02-28 NOTE — PROGRESS NOTES
SUBJECTIVE:   Elizabeth Hinton is a 39 year old female who presents to clinic today for the following health issues:      Concern - Fatigue and anxiety  Onset: 2 weeks    Description:   Tired; being around people causes her stress; a lot of family stress.     Intensity: moderate    Progression of Symptoms:  same    Accompanying Signs & Symptoms:  Lightheaded all the time    Previous history of similar problem:   no    Precipitating factors:   Worsened by: lack of sleep- only sleeps 3-4 hours at night;     Alleviating factors:  Improved by: none    Therapies Tried and outcome: nothing, meditate    Has been on Topamax in the past several months for migraines. Feels like it is generally working well. Still getting migraines once in a while. It has not made her sleepy when she takes it. She would like to try a higher dose to see if this improves the frequency of her migraines.     Vaginal Symptoms  Onset: yesterday    Description:  Vaginal Discharge: white   Itching (Pruritis): YES  Burning sensation:  YES  Odor: no     Accompanying Signs & Symptoms:  Pain with Urination: YES- burns a little, would like to be checked for gonorrhea and chlamydia as well  Abdominal Pain: no   Fever: no     History:   Sexually active: YES- havent for 2 weeks  New Partner: no   Possibility of Pregnancy:  No    Precipitating factors:   Recent Antibiotic Use: YES- UTI last visit was on an antibiotic    Alleviating factors:  Drinking water    Therapies Tried and outcome: none    Notices leaking urine when laughing for the past several years; Youngest child is a teenager. She previously did some vaginal physical therapy and she would like to try this again in the future. No symptoms of frequency,dysuria.  Problem list and histories reviewed & adjusted, as indicated.  Additional history: as documented    Patient Active Problem List   Diagnosis     Elevated BP without diagnosis of hypertension     PASTORA III (cervical intraepithelial neoplasia  III)     History reviewed. No pertinent surgical history.    Social History     Tobacco Use     Smoking status: Current Every Day Smoker     Packs/day: 1.00     Smokeless tobacco: Never Used   Substance Use Topics     Alcohol use: No     Family History   Problem Relation Age of Onset     Thyroid Disease Mother      Heart Disease Mother      Aneurysm Father            Reviewed and updated as needed this visit by clinical staff       Reviewed and updated as needed this visit by Provider         ROS:  Constitutional, HEENT, cardiovascular, pulmonary, gi and gu systems are negative, except as otherwise noted.    OBJECTIVE:     /86   Pulse 86   Temp 99.2  F (37.3  C) (Temporal)   Resp 12   Wt 72.4 kg (159 lb 11.2 oz)   LMP 02/27/2019 (Exact Date)   SpO2 98%   Breastfeeding? No   BMI 27.95 kg/m    Body mass index is 27.95 kg/m .   GENERAL: healthy, alert and no distress  NECK: no adenopathy, no asymmetry, masses, or scars and thyroid normal to palpation  RESP: lungs clear to auscultation - no rales, rhonchi or wheezes  CV: regular rate and rhythm, normal S1 S2, no S3 or S4, no murmur, click or rub, no peripheral edema and peripheral pulses strong  ABDOMEN: soft, nontender, no hepatosplenomegaly, no masses and bowel sounds normal  MS: no gross musculoskeletal defects noted, no edema  Skin: scaling and dryness present on both hands    Diagnostic Test Results:  No results found for this or any previous visit (from the past 24 hour(s)).    ASSESSMENT/PLAN:     Problem List Items Addressed This Visit     None      Visit Diagnoses     Vaginal itching    -  Primary    Relevant Medications    triamcinolone (ARISTOCORT HP) 0.5 % external cream    Other Relevant Orders    Wet prep (Completed)    Neisseria gonorrhoeae PCR    Chlamydia trachomatis PCR    Eczema, unspecified type        Relevant Medications    triamcinolone (ARISTOCORT HP) 0.5 % external cream    Screening for gonorrhea        Relevant Orders     Neisseria gonorrhoeae PCR    Chlamydia trachomatis PCR    Female stress incontinence        Relevant Orders    PHYSICAL THERAPY REFERRAL    Social anxiety disorder        Relevant Medications    propranolol (INDERAL) 10 MG tablet    topiramate (TOPAMAX) 100 MG tablet    topiramate (TOPAMAX) 50 MG tablet    Vitamin D deficiency        Relevant Medications    vitamin D3 (CHOLECALCIFEROL) 2000 units tablet    Iron deficiency        Relevant Medications    ferrous gluconate (FERGON) 324 (38 Fe) MG tablet    Other migraine without status migrainosus, intractable        Relevant Medications    propranolol (INDERAL) 10 MG tablet    topiramate (TOPAMAX) 100 MG tablet    topiramate (TOPAMAX) 50 MG tablet    BV (bacterial vaginosis)        Relevant Medications    metroNIDAZOLE (FLAGYL) 500 MG tablet    fluconazole (DIFLUCAN) 150 MG tablet    Other Relevant Orders    Neisseria gonorrhoeae PCR    Chlamydia trachomatis PCR         -try increasing dose of topamax to improve migraines  -add propranolol for social anxiety symptoms  -screening STIs  -treat BV; add treatment for eczema  XIN Sanders CNP  [facility] [place]  Please note greater than 50% of this 30 minute appointment were spent in face to face counseling with the patient of the issues described above in the history of present illness and in the plan, including adding medications

## 2019-03-07 PROBLEM — N39.3 FEMALE STRESS INCONTINENCE: Status: ACTIVE | Noted: 2019-03-07

## 2019-03-07 PROBLEM — F40.10 SOCIAL ANXIETY DISORDER: Status: ACTIVE | Noted: 2019-03-07

## 2019-03-07 PROBLEM — E61.1 IRON DEFICIENCY: Status: ACTIVE | Noted: 2019-03-07

## 2019-03-07 PROBLEM — L30.9 ECZEMA, UNSPECIFIED TYPE: Status: ACTIVE | Noted: 2019-03-07

## 2019-03-07 PROBLEM — G43.819 OTHER MIGRAINE WITHOUT STATUS MIGRAINOSUS, INTRACTABLE: Status: ACTIVE | Noted: 2019-03-07

## 2019-04-16 ENCOUNTER — OFFICE VISIT (OUTPATIENT)
Dept: DERMATOLOGY | Facility: CLINIC | Age: 39
End: 2019-04-16
Payer: COMMERCIAL

## 2019-04-16 DIAGNOSIS — D23.9 DILATED PORE OF WINER: ICD-10-CM

## 2019-04-16 DIAGNOSIS — L30.9 CHRONIC DERMATITIS OF HANDS: Primary | ICD-10-CM

## 2019-04-16 RX ORDER — AMMONIUM LACTATE 12 G/100G
CREAM TOPICAL
Qty: 385 G | Refills: 11 | Status: SHIPPED | OUTPATIENT
Start: 2019-04-16 | End: 2021-11-12

## 2019-04-16 RX ORDER — FLUOCINONIDE 0.5 MG/G
OINTMENT TOPICAL
Qty: 30 G | Refills: 1 | Status: SHIPPED | OUTPATIENT
Start: 2019-04-16 | End: 2021-11-12

## 2019-04-16 ASSESSMENT — PAIN SCALES - GENERAL: PAINLEVEL: NO PAIN (0)

## 2019-04-16 NOTE — LETTER
4/16/2019       RE: Elizabeth Hinton  [address]  [facility] [address]     Dear Colleague,    Thank you for referring your patient, Elizabeth Hinton, to the [facility] DERMATOLOGY at [facility]. Please see a copy of my visit note below.    [facility] Dermatology Note      Dermatology Problem List:  1. Dilated pore - central upper back, monitor  2. Hand dermatitis - lidex ointment bid x 2 weeks, amlactin moisturizer    Encounter Date: Apr 16, 2019    CC:   Chief Complaint   Patient presents with     Derm Problem     Possible cyst on back       History of Present Illness:  Ms. Elizabeth Hinton is a 39 year old female who presents for evaluation of a spot on her back and a rash on her hands. Regarding the spot on her back, she notes that she has a bump on the upper back that has been present for months. When she was getting a massage, white contents were expressed with an odor. She is wondering if the contents can be further extracted today.  In regards to her hand rash, patient notes dryness of her skin since the wintertime.  She was previously evaluated in the emergency room in January, and given triamcinolone 0.1% cream.  She uses the triamcinolone at least daily on the area of her hands are itching and scaly.  She also has numerous moisturizers at home including Aveeno but does not use them regularly.  She denies any rash on the rest of her body.  She has never had a rash like this before in the past.  She is otherwise feeling well and in her usual state of health.    Past Medical History:   Patient Active Problem List   Diagnosis     Elevated BP without diagnosis of hypertension     PASTORA III (cervical intraepithelial neoplasia III)     Female stress incontinence     Social anxiety disorder     Other migraine without status migrainosus, intractable     Iron deficiency     Eczema, unspecified type     Past Medical History:   Diagnosis Date      Abnormal Pap smear of cervix 10/18/2018    10/18/18: See problem list.      Abnormal Pap smear of vagina      Cervical high risk HPV (human papillomavirus) test positive 10/18/2018    10/18/18: See problem list.      Hypertension      Migraine      History reviewed. No pertinent surgical history.    Social History:   reports that she has been smoking.  She has been smoking about 1.00 pack per day. She has never used smokeless tobacco. She reports that she does not drink alcohol or use drugs.    Family History:  Family History   Problem Relation Age of Onset     Thyroid Disease Mother      Heart Disease Mother      Aneurysm Father        Medications:  Current Outpatient Medications   Medication Sig Dispense Refill     amLODIPine (NORVASC) 5 MG tablet Take 1 tablet (5 mg) by mouth daily 30 tablet 1     ammonium lactate (AMLACTIN) 12 % external cream Apply as moisturizer to hands three to four times a day 385 g 11     ferrous gluconate (FERGON) 324 (38 Fe) MG tablet Take 1 tablet (324 mg) by mouth daily (with breakfast) 90 tablet 3     fluconazole (DIFLUCAN) 150 MG tablet Take one today and one in 7 days 2 tablet 0     fluocinonide (LIDEX) 0.05 % external ointment Apply a thin layer to rash on hands twice a day as directed. 30 g 1     ibuprofen (ADVIL/MOTRIN) 600 MG tablet Take 1 tablet (600 mg) by mouth every 8 hours as needed for moderate pain 90 tablet 1     mometasone (ELOCON) 0.1 % external cream Apply topically daily 45 g 3     order for DME Equipment being ordered: blood pressure machine 1 each 0     propranolol (INDERAL) 10 MG tablet Take 1 tablet (10 mg) by mouth 2 times daily 60 tablet 1     topiramate (TOPAMAX) 100 MG tablet Take 1 tablet (100 mg) by mouth daily Take 100 mg + 50 mg together at bedtime 90 tablet 3     topiramate (TOPAMAX) 50 MG tablet Take one 50 mg at bedtime with one 100 mg tablet 90 tablet 3     triamcinolone (ARISTOCORT HP) 0.5 % external cream Apply topically 2 times daily 45 g 1      vitamin D3 (CHOLECALCIFEROL) 2000 units tablet Take 2 tablets by mouth daily 180 tablet 3     fluconazole (DIFLUCAN) 150 MG tablet Take one today and one in three days (Patient not taking: Reported on 2/28/2019) 2 tablet 0     nicotine (NICODERM CQ) 14 MG/24HR 24 hr patch Place 1 patch onto the skin every 24 hours For 6 weeks, then drop to 7 mg/d (Patient not taking: Reported on 10/30/2018) 45 patch 0     nicotine (NICODERM CQ) 21 MG/24HR 24 hr patch Place 1 patch onto the skin every 24 hours For 6 weeks, then drop to 14mg/d (Patient not taking: Reported on 10/30/2018) 45 patch 0     nicotine (NICODERM CQ) 7 MG/24HR 24 hr patch Place 1 patch onto the skin every 24 hours For 2 weeks (Patient not taking: Reported on 10/30/2018) 14 patch 0     terbinafine (LAMISIL) 1 % external cream Apply topically 2 times daily (Patient not taking: Reported on 2/28/2019) 30 g 1     triamcinolone (KENALOG) 0.1 % external cream Apply topically 2 times daily For no longer than 14 days (Patient not taking: Reported on 2/28/2019) 15 g 0        No Known Allergies      Review of Systems:  -Constitutional: The patient denies fatigue, fevers, chills, unintended weight loss, and night sweats.  -HEENT: Patient denies nonhealing oral sores.  -Skin: As above in HPI. No additional skin concerns.    Physical exam:  Vitals: There were no vitals taken for this visit.  GEN: This is a well developed, well-nourished female in no acute distress, in a pleasant mood.    SKIN: Focused examination of the back, hands was performed.  -Dilated pore on central upper back  -Scaling with some lichenification bilateral thumb web spaces.  Scaling of dorsal hands.  Minimal erythema  -No other lesions of concern on areas examined.     Impression/Plan:  1. Dilated pore on central upper back    Reviewed benign etiology and natural course    Minimal contents able to be extracted today with manual pressure    Reviewed with patient that excision would be the only permanent  option for removal.  Patient declines excision as she does not want a scar.  Agreed to monitor for now.    2. Hand dermatitis    Reviewed etiology and natural course    Increase to fluocinonide ointment twice daily for up to 2 weeks on rash areas of hands.  Reviewed possible side effects with long-term use including skin thinning and dyspigmentation    Start AmLactin cream as daily moisturizer throughout the day    Stop triamcinolone 0.1%    Reviewed itch scratch cycle with patient and the importance of not scratching    Follow-up in 2 months if persists, earlier for new or changing lesions.       Dr. Marcus Pandya staffed the patient.    Staff Involved:  Resident(Madyson Interiano)/Staff(as above)    Staff Physician Comments:   I saw and evaluated the patient with the resident and I agree with the assessment and plan.  I was present for the examination. I have made edits if needed.    Marcus Pandya MD  Staff Dermatologist and Dermatopathologist  , Department of Dermatology

## 2019-04-16 NOTE — NURSING NOTE
Dermatology Rooming Note    Elizabeth Hinton's goals for this visit include:   Chief Complaint   Patient presents with     Derm Problem     Possible cyst on back     Daintza Dutton, CMA

## 2019-04-16 NOTE — PROGRESS NOTES
[facility] Dermatology Note      Dermatology Problem List:  1. Dilated pore - central upper back, monitor  2. Hand dermatitis - lidex ointment bid x 2 weeks, amlactin moisturizer    Encounter Date: Apr 16, 2019    CC:   Chief Complaint   Patient presents with     Derm Problem     Possible cyst on back       History of Present Illness:  Ms. Elizabeth Hinton is a 39 year old female who presents for evaluation of a spot on her back and a rash on her hands. Regarding the spot on her back, she notes that she has a bump on the upper back that has been present for months. When she was getting a massage, white contents were expressed with an odor. She is wondering if the contents can be further extracted today.  In regards to her hand rash, patient notes dryness of her skin since the wintertime.  She was previously evaluated in the emergency room in January, and given triamcinolone 0.1% cream.  She uses the triamcinolone at least daily on the area of her hands are itching and scaly.  She also has numerous moisturizers at home including Aveeno but does not use them regularly.  She denies any rash on the rest of her body.  She has never had a rash like this before in the past.  She is otherwise feeling well and in her usual state of health.    Past Medical History:   Patient Active Problem List   Diagnosis     Elevated BP without diagnosis of hypertension     PASTORA III (cervical intraepithelial neoplasia III)     Female stress incontinence     Social anxiety disorder     Other migraine without status migrainosus, intractable     Iron deficiency     Eczema, unspecified type     Past Medical History:   Diagnosis Date     Abnormal Pap smear of cervix 10/18/2018    10/18/18: See problem list.      Abnormal Pap smear of vagina      Cervical high risk HPV (human papillomavirus) test positive 10/18/2018    10/18/18: See problem list.      Hypertension      Migraine      History reviewed. No pertinent surgical  history.    Social History:   reports that she has been smoking.  She has been smoking about 1.00 pack per day. She has never used smokeless tobacco. She reports that she does not drink alcohol or use drugs.    Family History:  Family History   Problem Relation Age of Onset     Thyroid Disease Mother      Heart Disease Mother      Aneurysm Father        Medications:  Current Outpatient Medications   Medication Sig Dispense Refill     amLODIPine (NORVASC) 5 MG tablet Take 1 tablet (5 mg) by mouth daily 30 tablet 1     ammonium lactate (AMLACTIN) 12 % external cream Apply as moisturizer to hands three to four times a day 385 g 11     ferrous gluconate (FERGON) 324 (38 Fe) MG tablet Take 1 tablet (324 mg) by mouth daily (with breakfast) 90 tablet 3     fluconazole (DIFLUCAN) 150 MG tablet Take one today and one in 7 days 2 tablet 0     fluocinonide (LIDEX) 0.05 % external ointment Apply a thin layer to rash on hands twice a day as directed. 30 g 1     ibuprofen (ADVIL/MOTRIN) 600 MG tablet Take 1 tablet (600 mg) by mouth every 8 hours as needed for moderate pain 90 tablet 1     mometasone (ELOCON) 0.1 % external cream Apply topically daily 45 g 3     order for DME Equipment being ordered: blood pressure machine 1 each 0     propranolol (INDERAL) 10 MG tablet Take 1 tablet (10 mg) by mouth 2 times daily 60 tablet 1     topiramate (TOPAMAX) 100 MG tablet Take 1 tablet (100 mg) by mouth daily Take 100 mg + 50 mg together at bedtime 90 tablet 3     topiramate (TOPAMAX) 50 MG tablet Take one 50 mg at bedtime with one 100 mg tablet 90 tablet 3     triamcinolone (ARISTOCORT HP) 0.5 % external cream Apply topically 2 times daily 45 g 1     vitamin D3 (CHOLECALCIFEROL) 2000 units tablet Take 2 tablets by mouth daily 180 tablet 3     fluconazole (DIFLUCAN) 150 MG tablet Take one today and one in three days (Patient not taking: Reported on 2/28/2019) 2 tablet 0     nicotine (NICODERM CQ) 14 MG/24HR 24 hr patch Place 1 patch onto  the skin every 24 hours For 6 weeks, then drop to 7 mg/d (Patient not taking: Reported on 10/30/2018) 45 patch 0     nicotine (NICODERM CQ) 21 MG/24HR 24 hr patch Place 1 patch onto the skin every 24 hours For 6 weeks, then drop to 14mg/d (Patient not taking: Reported on 10/30/2018) 45 patch 0     nicotine (NICODERM CQ) 7 MG/24HR 24 hr patch Place 1 patch onto the skin every 24 hours For 2 weeks (Patient not taking: Reported on 10/30/2018) 14 patch 0     terbinafine (LAMISIL) 1 % external cream Apply topically 2 times daily (Patient not taking: Reported on 2/28/2019) 30 g 1     triamcinolone (KENALOG) 0.1 % external cream Apply topically 2 times daily For no longer than 14 days (Patient not taking: Reported on 2/28/2019) 15 g 0        No Known Allergies      Review of Systems:  -Constitutional: The patient denies fatigue, fevers, chills, unintended weight loss, and night sweats.  -HEENT: Patient denies nonhealing oral sores.  -Skin: As above in HPI. No additional skin concerns.    Physical exam:  Vitals: There were no vitals taken for this visit.  GEN: This is a well developed, well-nourished female in no acute distress, in a pleasant mood.    SKIN: Focused examination of the back, hands was performed.  -Dilated pore on central upper back  -Scaling with some lichenification bilateral thumb web spaces.  Scaling of dorsal hands.  Minimal erythema  -No other lesions of concern on areas examined.     Impression/Plan:  1. Dilated pore on central upper back    Reviewed benign etiology and natural course    Minimal contents able to be extracted today with manual pressure    Reviewed with patient that excision would be the only permanent option for removal.  Patient declines excision as she does not want a scar.  Agreed to monitor for now.    2. Hand dermatitis    Reviewed etiology and natural course    Increase to fluocinonide ointment twice daily for up to 2 weeks on rash areas of hands.  Reviewed possible side effects  with long-term use including skin thinning and dyspigmentation    Start AmLactin cream as daily moisturizer throughout the day    Stop triamcinolone 0.1%    Reviewed itch scratch cycle with patient and the importance of not scratching    Follow-up in 2 months if persists, earlier for new or changing lesions.       Dr. Marcus Pandya staffed the patient.    Staff Involved:  Resident(Madyson Interiano)/Staff(as above)    Staff Physician Comments:   I saw and evaluated the patient with the resident and I agree with the assessment and plan.  I was present for the examination. I have made edits if needed.    Marcus Pandya MD  Staff Dermatologist and Dermatopathologist  , Department of Dermatology

## 2019-04-16 NOTE — PATIENT INSTRUCTIONS
For rash on hands:  - use the fluocinonide ointment twice a day to the rash areas for up to 2-3 weeks. Remember, this is a steroid medicine. It can cause thinning of the skin and skin discoloration with long term use.  - use the amlactin (ammonium lactate) ointment multiple times throughout the day as a moisturizer      Come back and see us in 1-2 months if rash persists.

## 2019-06-12 NOTE — PROGRESS NOTES
Subjective     Elizabeth Hinton is a 39 year old female who presents to clinic today for the following health issues:    HPI   Concern - Frequent urination    Onset: ongoing issue since 2016    Description:   Urgent to need to go to the bathroom and can't hold it    Intensity: severe    Progression of Symptoms:  same and constant    Accompanying Signs & Symptoms:  none    Previous history of similar problem:   yes    Precipitating factors:   Worsened by: drink a lot of water    Alleviating factors:  Improved by: none    Therapies Tried and outcome: doing exercises but doesn't help, few years ago was going to get a procedure for the pelvic floor or a sling       Mental Health Concerns- Trouble with attention many years. Anxiety level has been stable. Frequently loses things, frequently late. No academic troubles.  Would like to have ADHD assessment- children have ADHD.    Vaginal symptoms- mild discharge and odor, and would like STI screening.      Rash  Duration of complaint:    Description:   Location: arms  Character: blotchy  Itching (Pruritis): YES  Progression of Symptoms:  improving  Accompanying Signs & Symptoms:  Fever: no   Body aches or joint pain: no   Sore throat symptoms: no   Recent cold symptoms: no   History:   Previous similar rash: YES  Precipitating factors:   Exposure to similar rash: no   New exposures: None   Recent travel: no   Alleviating factors: none  Therapies Tried and outcome: none    Patient Active Problem List   Diagnosis     Elevated BP without diagnosis of hypertension     PASTORA III (cervical intraepithelial neoplasia III)     Female stress incontinence     Social anxiety disorder     Other migraine without status migrainosus, intractable     Iron deficiency     Eczema, unspecified type     History reviewed. No pertinent surgical history.    Social History     Tobacco Use     Smoking status: Current Every Day Smoker     Packs/day: 1.00     Smokeless tobacco: Never Used   Substance Use  "Topics     Alcohol use: No     Family History   Problem Relation Age of Onset     Thyroid Disease Mother      Heart Disease Mother      Aneurysm Father            -------------------------------------  Reviewed and updated as needed this visit by Provider         Review of Systems   ROS COMP: Constitutional, HEENT, cardiovascular, pulmonary, GI, , musculoskeletal, neuro, skin, endocrine and psych systems are negative, except as otherwise noted.      Objective    /86   Pulse 85   Temp 98.5  F (36.9  C) (Temporal)   Resp 14   Ht 1.614 m (5' 3.54\")   Wt 75 kg (165 lb 4.8 oz)   LMP 05/16/2019 (Approximate)   SpO2 100%   BMI 28.78 kg/m    Body mass index is 28.78 kg/m .  Physical Exam   GENERAL: healthy, alert and no distress  EYES: Eyes grossly normal to inspection, PERRL and conjunctivae and sclerae normal  HENT: ear canals and TM's normal, nose and mouth without ulcers or lesions  NECK: no adenopathy, no asymmetry, masses, or scars and thyroid normal to palpation  RESP: lungs clear to auscultation - no rales, rhonchi or wheezes  CV: regular rate and rhythm, normal S1 S2, no S3 or S4, no murmur, click or rub, no peripheral edema and peripheral pulses strong  ABDOMEN: soft, nontender, no hepatosplenomegaly, no masses and bowel sounds normal   (female): normal female external genitalia, normal urethral meatus, vaginal mucosa, normal cervix/adnexa/uterus without masses or discharge  MS: no gross musculoskeletal defects noted, no edema    Diagnostic Test Results:  Labs reviewed in Epic  Results for orders placed or performed in visit on 06/13/19 (from the past 24 hour(s))   Wet prep   Result Value Ref Range    Specimen Description Vagina     Wet Prep No Trichomonas seen     Wet Prep Moderate  Clue cells seen   (A)     Wet Prep No yeast seen     Wet Prep No WBC's seen            Assessment & Plan   Problem List Items Addressed This Visit     Female stress incontinence    Relevant Orders    UROLOGY ADULT " "REFERRAL    Eczema, unspecified type    Relevant Medications    Emollient (AQUAPHOR ADVANCED THERAPY) OINT      Other Visit Diagnoses     Routine general medical examination at a health care facility    -  Primary    Relevant Orders    MENTAL HEALTH REFERRAL  - Adult; Assessments and Testing; ADHD; [facility]: [facility] [phone]; We will contact you to schedule the appointment or please call with any questions    Screening for cervical cancer        Relevant Orders    Pap imaged thin layer screen with HPV - recommended age 30 - 65 (Completed)    HPV High Risk Types DNA Cervical (Completed)    Cervicalgia        Relevant Medications    ibuprofen (ADVIL/MOTRIN) 600 MG tablet    Acute upper back pain        Relevant Medications    ibuprofen (ADVIL/MOTRIN) 600 MG tablet    Screening for deficiency anemia        Relevant Orders    Vitamin B12 (Completed)    Routine screening for STI (sexually transmitted infection)        Relevant Orders    HIV Antigen Antibody Combo (Completed)    Wet prep (Completed)    Treponema Abs w Reflex to RPR and Titer (Completed)    NEISSERIA GONORRHOEA PCR (Completed)    CHLAMYDIA TRACHOMATIS PCR (Completed)    Overweight        Relevant Medications    phentermine (ADIPEX-P) 37.5 MG capsule    Tobacco abuse        Relevant Medications    phentermine (ADIPEX-P) 37.5 MG capsule    nicotine (NICODERM CQ) 21 MG/24HR 24 hr patch    nicotine (NICORETTE) 2 MG gum    Bacterial vaginosis        Relevant Medications    metroNIDAZOLE (METROGEL) 0.75 % vaginal gel             Tobacco Cessation:   reports that she has been smoking.  She has been smoking about 1.00 pack per day. She has never used smokeless tobacco.  Tobacco Cessation Action Plan: Pharmacotherapies : Nicotine patch and other Nicotine replacement      BMI:   Estimated body mass index is 28.78 kg/m  as calculated from the following:    Height as of this encounter: 1.614 m (5' 3.54\").    Weight as of this encounter: 75 kg (165 " lb 4.8 oz).   Weight management plan: Specific weight management program called phentermine discussed          No follow-ups on file.    XIN Sanders [facility]

## 2019-06-13 ENCOUNTER — OFFICE VISIT (OUTPATIENT)
Dept: FAMILY MEDICINE | Facility: CLINIC | Age: 39
End: 2019-06-13
Payer: COMMERCIAL

## 2019-06-13 VITALS
HEIGHT: 64 IN | TEMPERATURE: 98.5 F | WEIGHT: 165.3 LBS | SYSTOLIC BLOOD PRESSURE: 120 MMHG | DIASTOLIC BLOOD PRESSURE: 86 MMHG | RESPIRATION RATE: 14 BRPM | BODY MASS INDEX: 28.22 KG/M2 | OXYGEN SATURATION: 100 % | HEART RATE: 85 BPM

## 2019-06-13 DIAGNOSIS — Z00.00 ROUTINE GENERAL MEDICAL EXAMINATION AT A HEALTH CARE FACILITY: Primary | ICD-10-CM

## 2019-06-13 DIAGNOSIS — N39.3 FEMALE STRESS INCONTINENCE: ICD-10-CM

## 2019-06-13 DIAGNOSIS — M54.2 CERVICALGIA: ICD-10-CM

## 2019-06-13 DIAGNOSIS — B96.89 BACTERIAL VAGINOSIS: ICD-10-CM

## 2019-06-13 DIAGNOSIS — Z11.3 ROUTINE SCREENING FOR STI (SEXUALLY TRANSMITTED INFECTION): ICD-10-CM

## 2019-06-13 DIAGNOSIS — Z13.0 SCREENING FOR DEFICIENCY ANEMIA: ICD-10-CM

## 2019-06-13 DIAGNOSIS — M54.9 ACUTE UPPER BACK PAIN: ICD-10-CM

## 2019-06-13 DIAGNOSIS — Z72.0 TOBACCO ABUSE: ICD-10-CM

## 2019-06-13 DIAGNOSIS — N76.0 BACTERIAL VAGINOSIS: ICD-10-CM

## 2019-06-13 DIAGNOSIS — L30.9 ECZEMA, UNSPECIFIED TYPE: ICD-10-CM

## 2019-06-13 DIAGNOSIS — Z12.4 SCREENING FOR CERVICAL CANCER: ICD-10-CM

## 2019-06-13 DIAGNOSIS — E66.3 OVERWEIGHT: ICD-10-CM

## 2019-06-13 LAB
SPECIMEN SOURCE: ABNORMAL
VIT B12 SERPL-MCNC: 575 PG/ML (ref 193–986)
WET PREP SPEC: ABNORMAL

## 2019-06-13 PROCEDURE — 36415 COLL VENOUS BLD VENIPUNCTURE: CPT | Performed by: NURSE PRACTITIONER

## 2019-06-13 PROCEDURE — G0145 SCR C/V CYTO,THINLAYER,RESCR: HCPCS | Performed by: NURSE PRACTITIONER

## 2019-06-13 PROCEDURE — 87591 N.GONORRHOEAE DNA AMP PROB: CPT | Performed by: NURSE PRACTITIONER

## 2019-06-13 PROCEDURE — G0476 HPV COMBO ASSAY CA SCREEN: HCPCS | Performed by: NURSE PRACTITIONER

## 2019-06-13 PROCEDURE — 86780 TREPONEMA PALLIDUM: CPT | Performed by: NURSE PRACTITIONER

## 2019-06-13 PROCEDURE — 87389 HIV-1 AG W/HIV-1&-2 AB AG IA: CPT | Performed by: NURSE PRACTITIONER

## 2019-06-13 PROCEDURE — 82607 VITAMIN B-12: CPT | Performed by: NURSE PRACTITIONER

## 2019-06-13 PROCEDURE — 99214 OFFICE O/P EST MOD 30 MIN: CPT | Performed by: NURSE PRACTITIONER

## 2019-06-13 PROCEDURE — 87491 CHLMYD TRACH DNA AMP PROBE: CPT | Performed by: NURSE PRACTITIONER

## 2019-06-13 PROCEDURE — 87210 SMEAR WET MOUNT SALINE/INK: CPT | Performed by: NURSE PRACTITIONER

## 2019-06-13 RX ORDER — NICOTINE 21 MG/24HR
1 PATCH, TRANSDERMAL 24 HOURS TRANSDERMAL EVERY 24 HOURS
Qty: 28 PATCH | Refills: 1 | Status: SHIPPED | OUTPATIENT
Start: 2019-06-13 | End: 2021-11-12

## 2019-06-13 RX ORDER — PHENTERMINE HYDROCHLORIDE 37.5 MG/1
37.5 CAPSULE ORAL EVERY MORNING
Qty: 30 CAPSULE | Refills: 3 | Status: SHIPPED | OUTPATIENT
Start: 2019-06-13 | End: 2019-12-09 | Stop reason: ALTCHOICE

## 2019-06-13 RX ORDER — METRONIDAZOLE 7.5 MG/G
1 GEL VAGINAL DAILY
Qty: 25 G | Refills: 0 | Status: SHIPPED | OUTPATIENT
Start: 2019-06-13 | End: 2019-07-21

## 2019-06-13 RX ORDER — IBUPROFEN 600 MG/1
600 TABLET, FILM COATED ORAL EVERY 8 HOURS PRN
Qty: 90 TABLET | Refills: 1 | Status: SHIPPED | OUTPATIENT
Start: 2019-06-13 | End: 2021-11-12

## 2019-06-13 ASSESSMENT — MIFFLIN-ST. JEOR: SCORE: 1412.55

## 2019-06-14 LAB
C TRACH DNA SPEC QL NAA+PROBE: NEGATIVE
HIV 1+2 AB+HIV1 P24 AG SERPL QL IA: NONREACTIVE
N GONORRHOEA DNA SPEC QL NAA+PROBE: NEGATIVE
SPECIMEN SOURCE: NORMAL
SPECIMEN SOURCE: NORMAL
T PALLIDUM AB SER QL: NONREACTIVE

## 2019-06-17 LAB
COPATH REPORT: NORMAL
PAP: NORMAL

## 2019-07-21 DIAGNOSIS — B96.89 BACTERIAL VAGINOSIS: ICD-10-CM

## 2019-07-21 DIAGNOSIS — N76.0 BACTERIAL VAGINOSIS: ICD-10-CM

## 2019-07-22 NOTE — TELEPHONE ENCOUNTER
METRONIDAZOLE 0.75% VAGINAL GEL 70G      Last Written Prescription Date:  06/13/2019  Last Fill Quantity: 25,   # refills: 0  Last Office Visit: 06/13/2019  Future Office visit:       Routing refill request to provider for review/approval because:  Drug not on the [facility], [facility] or [facility] refill protocol or controlled substance

## 2019-07-22 NOTE — TELEPHONE ENCOUNTER
Spoke with the patient who reports a return of the vaginal symptoms.  Will route to PCP for review. Lisa King RN July 22, 2019 3:29 PM

## 2019-07-29 RX ORDER — METRONIDAZOLE 7.5 MG/G
GEL VAGINAL
Qty: 70 G | Refills: 0 | Status: SHIPPED | OUTPATIENT
Start: 2019-07-29 | End: 2019-12-09

## 2019-12-09 ENCOUNTER — OFFICE VISIT (OUTPATIENT)
Dept: FAMILY MEDICINE | Facility: CLINIC | Age: 39
End: 2019-12-09
Payer: COMMERCIAL

## 2019-12-09 VITALS
HEART RATE: 81 BPM | SYSTOLIC BLOOD PRESSURE: 151 MMHG | DIASTOLIC BLOOD PRESSURE: 93 MMHG | TEMPERATURE: 97.6 F | OXYGEN SATURATION: 98 % | BODY MASS INDEX: 30.3 KG/M2 | WEIGHT: 174 LBS

## 2019-12-09 DIAGNOSIS — G43.819 OTHER MIGRAINE WITHOUT STATUS MIGRAINOSUS, INTRACTABLE: ICD-10-CM

## 2019-12-09 DIAGNOSIS — N89.8 VAGINAL DISCHARGE: ICD-10-CM

## 2019-12-09 DIAGNOSIS — N63.25 BREAST LUMP ON LEFT SIDE AT 12 O'CLOCK POSITION: Primary | ICD-10-CM

## 2019-12-09 DIAGNOSIS — I10 ESSENTIAL HYPERTENSION: ICD-10-CM

## 2019-12-09 DIAGNOSIS — B96.89 BACTERIAL VAGINOSIS: ICD-10-CM

## 2019-12-09 DIAGNOSIS — N76.0 BACTERIAL VAGINOSIS: ICD-10-CM

## 2019-12-09 DIAGNOSIS — E66.3 OVERWEIGHT: ICD-10-CM

## 2019-12-09 DIAGNOSIS — E61.1 IRON DEFICIENCY: ICD-10-CM

## 2019-12-09 LAB
ERYTHROCYTE [DISTWIDTH] IN BLOOD BY AUTOMATED COUNT: 15 % (ref 10–15)
HCT VFR BLD AUTO: 40.1 % (ref 35–47)
HGB BLD-MCNC: 13.1 G/DL (ref 11.7–15.7)
MCH RBC QN AUTO: 26.7 PG (ref 26.5–33)
MCHC RBC AUTO-ENTMCNC: 32.7 G/DL (ref 31.5–36.5)
MCV RBC AUTO: 82 FL (ref 78–100)
PLATELET # BLD AUTO: 317 10E9/L (ref 150–450)
RBC # BLD AUTO: 4.9 10E12/L (ref 3.8–5.2)
SPECIMEN SOURCE: ABNORMAL
WBC # BLD AUTO: 10.4 10E9/L (ref 4–11)
WET PREP SPEC: ABNORMAL

## 2019-12-09 PROCEDURE — 83540 ASSAY OF IRON: CPT | Performed by: NURSE PRACTITIONER

## 2019-12-09 PROCEDURE — 99214 OFFICE O/P EST MOD 30 MIN: CPT | Performed by: NURSE PRACTITIONER

## 2019-12-09 PROCEDURE — 87210 SMEAR WET MOUNT SALINE/INK: CPT | Performed by: NURSE PRACTITIONER

## 2019-12-09 PROCEDURE — 36415 COLL VENOUS BLD VENIPUNCTURE: CPT | Performed by: NURSE PRACTITIONER

## 2019-12-09 PROCEDURE — 85027 COMPLETE CBC AUTOMATED: CPT | Performed by: NURSE PRACTITIONER

## 2019-12-09 PROCEDURE — 83550 IRON BINDING TEST: CPT | Performed by: NURSE PRACTITIONER

## 2019-12-09 PROCEDURE — 80053 COMPREHEN METABOLIC PANEL: CPT | Performed by: NURSE PRACTITIONER

## 2019-12-09 RX ORDER — TOPIRAMATE 50 MG/1
TABLET, FILM COATED ORAL
Qty: 90 TABLET | Refills: 3 | Status: SHIPPED | OUTPATIENT
Start: 2019-12-09 | End: 2020-06-02

## 2019-12-09 RX ORDER — PHENTERMINE HYDROCHLORIDE 37.5 MG/1
37.5 TABLET ORAL
Qty: 30 TABLET | Refills: 3 | Status: SHIPPED | OUTPATIENT
Start: 2019-12-09 | End: 2020-06-02

## 2019-12-09 RX ORDER — HYDROCHLOROTHIAZIDE 25 MG/1
25 TABLET ORAL DAILY
Qty: 90 TABLET | Refills: 3 | Status: SHIPPED | OUTPATIENT
Start: 2019-12-09 | End: 2020-06-02

## 2019-12-09 RX ORDER — FERROUS GLUCONATE 324(38)MG
324 TABLET ORAL
Qty: 90 TABLET | Refills: 3 | Status: SHIPPED | OUTPATIENT
Start: 2019-12-09 | End: 2020-12-11

## 2019-12-09 RX ORDER — TOPIRAMATE 100 MG/1
100 TABLET, FILM COATED ORAL DAILY
Qty: 90 TABLET | Refills: 3 | Status: SHIPPED | OUTPATIENT
Start: 2019-12-09 | End: 2020-06-02

## 2019-12-09 NOTE — PROGRESS NOTES
Subjective     Elizabeth Hinton is a 39 year old female who presents to clinic today for the following health issues:    HPI   Joint Pain hands     Onset: 2 months     Description:   Location: bilateral hands   Character: Cramping, aches     Intensity: mild    Progression of Symptoms: worse    Accompanying Signs & Symptoms:  Other symptoms: numbness    History:   Previous similar pain: no       Precipitating factors:   Trauma or overuse: no     Alleviating factors:  Improved by: rest/inactivity    Therapies Tried and outcome: none      Breast lump left breast     1 week     Pain left breast, chest pain    No history of previous lump in area     Requesting pap smear previous abn pap  Lab Results   Component Value Date    PAP NIL 06/13/2019       Has noticed some ongoing vaginal discharge- would like to have yeast and BV ruled out.    Has been on phentermine tablets in the past; would like to re-start today and use for the next few months. Working two jobs currently and eating schedule is still somewhat irregular.    Ongoing eczema symptoms in both hands- very dry and irritated.   Patient Active Problem List   Diagnosis     Elevated BP without diagnosis of hypertension     PASTORA III (cervical intraepithelial neoplasia III)     Female stress incontinence     Social anxiety disorder     Other migraine without status migrainosus, intractable     Iron deficiency     Eczema, unspecified type     No past surgical history on file.    Social History     Tobacco Use     Smoking status: Current Every Day Smoker     Packs/day: 1.00     Smokeless tobacco: Never Used   Substance Use Topics     Alcohol use: No     Family History   Problem Relation Age of Onset     Thyroid Disease Mother      Heart Disease Mother      Aneurysm Father            Reviewed and updated as needed this visit by Provider         Review of Systems   ROS COMP: Constitutional, HEENT, cardiovascular, pulmonary, gi and gu systems are negative, except as  otherwise noted.      Objective    BP (!) 151/93   Pulse 81   Temp 97.6  F (36.4  C) (Oral)   Wt 78.9 kg (174 lb)   LMP 11/28/2019   SpO2 98%   BMI 30.30 kg/m    Body mass index is 30.3 kg/m .  Physical Exam   GENERAL: healthy, alert and no distress  HENT: ear canals and TM's normal, nose and mouth without ulcers or lesions  NECK: no adenopathy, no asymmetry, masses, or scars and thyroid normal to palpation  RESP: lungs clear to auscultation - no rales, rhonchi or wheezes  BREAST: mass left breast inferior to left nipple  CV: regular rate and rhythm, normal S1 S2, no S3 or S4, no murmur, click or rub, no peripheral edema and peripheral pulses strong  ABDOMEN: soft, nontender, no hepatosplenomegaly, no masses and bowel sounds normal  MS: no gross musculoskeletal defects noted, no edema  SKIN: no suspicious lesions or rashes and erythematous dry patches on both hands  NEURO: Normal strength and tone, mentation intact and speech normal  PSYCH: mentation appears normal, affect normal/bright    Diagnostic Test Results:  Labs reviewed in Epic  No results found for this or any previous visit (from the past 24 hour(s)).        Assessment & Plan   Problem List Items Addressed This Visit     Other migraine without status migrainosus, intractable    Relevant Medications    topiramate (TOPAMAX) 100 MG tablet    topiramate (TOPAMAX) 50 MG tablet    Other Relevant Orders    CBC with platelets (Completed)    Iron and iron binding capacity (Completed)    Comprehensive metabolic panel (Completed)    Iron deficiency    Relevant Medications    ferrous gluconate (FERGON) 324 (38 Fe) MG tablet      Other Visit Diagnoses     Breast lump on left side at 12 o'clock position    -  Primary    Relevant Orders    MA Diagnostic Digital Bilateral    Overweight        Relevant Medications    phentermine (ADIPEX-P) 37.5 MG tablet    Vaginal discharge        Relevant Orders    Wet prep (Completed)    Essential hypertension        Relevant  "Medications    hydrochlorothiazide (HYDRODIURIL) 25 MG tablet    Bacterial vaginosis        Relevant Medications    metroNIDAZOLE (METROGEL) 0.75 % vaginal gel         Follow up 1 month    BMI:   Estimated body mass index is 30.3 kg/m  as calculated from the following:    Height as of 6/13/19: 1.614 m (5' 3.54\").    Weight as of this encounter: 78.9 kg (174 lb).   Weight management plan noted, stable and monitoring        See Patient Instructions  No follow-ups on file.    XIN Sanders [facility]    "

## 2019-12-10 LAB
ALBUMIN SERPL-MCNC: 3.5 G/DL (ref 3.4–5)
ALP SERPL-CCNC: 76 U/L (ref 40–150)
ALT SERPL W P-5'-P-CCNC: 28 U/L (ref 0–50)
ANION GAP SERPL CALCULATED.3IONS-SCNC: 5 MMOL/L (ref 3–14)
AST SERPL W P-5'-P-CCNC: 26 U/L (ref 0–45)
BILIRUB SERPL-MCNC: 0.2 MG/DL (ref 0.2–1.3)
BUN SERPL-MCNC: 8 MG/DL (ref 7–30)
CALCIUM SERPL-MCNC: 8.8 MG/DL (ref 8.5–10.1)
CHLORIDE SERPL-SCNC: 106 MMOL/L (ref 94–109)
CO2 SERPL-SCNC: 29 MMOL/L (ref 20–32)
CREAT SERPL-MCNC: 0.64 MG/DL (ref 0.52–1.04)
GFR SERPL CREATININE-BSD FRML MDRD: >90 ML/MIN/{1.73_M2}
GLUCOSE SERPL-MCNC: 84 MG/DL (ref 70–99)
IRON SATN MFR SERPL: 15 % (ref 15–46)
IRON SERPL-MCNC: 54 UG/DL (ref 35–180)
POTASSIUM SERPL-SCNC: 3.9 MMOL/L (ref 3.4–5.3)
PROT SERPL-MCNC: 6.9 G/DL (ref 6.8–8.8)
SODIUM SERPL-SCNC: 140 MMOL/L (ref 133–144)
TIBC SERPL-MCNC: 372 UG/DL (ref 240–430)

## 2019-12-11 RX ORDER — METRONIDAZOLE 7.5 MG/G
1 GEL VAGINAL DAILY
Qty: 35 G | Refills: 0 | Status: SHIPPED | OUTPATIENT
Start: 2019-12-11 | End: 2019-12-18

## 2020-02-03 DIAGNOSIS — N76.0 BV (BACTERIAL VAGINOSIS): ICD-10-CM

## 2020-02-03 DIAGNOSIS — B96.89 BV (BACTERIAL VAGINOSIS): ICD-10-CM

## 2020-02-03 RX ORDER — METRONIDAZOLE 500 MG/1
TABLET ORAL
Qty: 14 TABLET | Refills: 0 | OUTPATIENT
Start: 2020-02-03

## 2020-04-15 DIAGNOSIS — E55.9 VITAMIN D DEFICIENCY: ICD-10-CM

## 2020-04-15 RX ORDER — CHOLECALCIFEROL (VITAMIN D3) 50 MCG
2 TABLET ORAL DAILY
Qty: 180 TABLET | Refills: 2 | Status: SHIPPED | OUTPATIENT
Start: 2020-04-15

## 2020-04-15 NOTE — TELEPHONE ENCOUNTER
"Requested Prescriptions   Pending Prescriptions Disp Refills     vitamin D3 (CHOLECALCIFEROL) 2000 units (50 mcg) tablet [Pharmacy Med Name: VITAMIN D 2,000UNIT TABLETS] 180 tablet 3     Sig: TAKE 2 TABLETS BY MOUTH DAILY       Vitamin Supplements (Adult) Protocol Passed - 4/15/2020  3:01 PM        Passed - High dose Vitamin D not ordered        Passed - Recent (12 mo) or future (30 days) visit within the authorizing provider's specialty     Patient has had an office visit with the authorizing provider or a provider within the authorizing providers department within the previous 12 mos or has a future within next 30 days. See \"Patient Info\" tab in inbasket, or \"Choose Columns\" in Meds & Orders section of the refill encounter.              Passed - Medication is active on med list           Signed Prescriptions:                        Disp   Refills    vitamin D3 (CHOLECALCIFEROL) 2000 units (5*180 ta*2        Sig: TAKE 2 TABLETS BY MOUTH DAILY  Authorizing Provider: ROGELIO WHITING  Ordering User: DARLENE VERAS      "

## 2020-06-02 ENCOUNTER — VIRTUAL VISIT (OUTPATIENT)
Dept: FAMILY MEDICINE | Facility: CLINIC | Age: 40
End: 2020-06-02
Payer: COMMERCIAL

## 2020-06-02 DIAGNOSIS — E66.3 OVERWEIGHT: ICD-10-CM

## 2020-06-02 DIAGNOSIS — G43.819 OTHER MIGRAINE WITHOUT STATUS MIGRAINOSUS, INTRACTABLE: ICD-10-CM

## 2020-06-02 DIAGNOSIS — R30.0 DYSURIA: Primary | ICD-10-CM

## 2020-06-02 PROCEDURE — 99214 OFFICE O/P EST MOD 30 MIN: CPT | Mod: 95 | Performed by: NURSE PRACTITIONER

## 2020-06-02 RX ORDER — PHENTERMINE HYDROCHLORIDE 37.5 MG/1
37.5 TABLET ORAL
Qty: 30 TABLET | Refills: 3 | Status: SHIPPED | OUTPATIENT
Start: 2020-06-02

## 2020-06-02 RX ORDER — TOPIRAMATE 100 MG/1
100 TABLET, FILM COATED ORAL DAILY
Qty: 90 TABLET | Refills: 3 | Status: SHIPPED | OUTPATIENT
Start: 2020-06-02 | End: 2021-08-02

## 2020-06-02 RX ORDER — TOPIRAMATE 50 MG/1
TABLET, FILM COATED ORAL
Qty: 90 TABLET | Refills: 3 | Status: SHIPPED | OUTPATIENT
Start: 2020-06-02 | End: 2021-08-02

## 2020-06-02 RX ORDER — CIPROFLOXACIN 250 MG/1
250 TABLET, FILM COATED ORAL 2 TIMES DAILY
Qty: 6 TABLET | Refills: 0 | Status: SHIPPED | OUTPATIENT
Start: 2020-06-02 | End: 2020-06-05

## 2020-06-02 NOTE — PROGRESS NOTES
"Elizabeth Hinton is a 40 year old female who is being evaluated via a billable telephone visit.      The patient has been notified of following:     \"This telephone visit will be conducted via a call between you and your physician/provider. We have found that certain health care needs can be provided without the need for a physical exam.  This service lets us provide the care you need with a short phone conversation.  If a prescription is necessary we can send it directly to your pharmacy.  If lab work is needed we can place an order for that and you can then stop by our lab to have the test done at a later time.    Telephone visits are billed at different rates depending on your insurance coverage. During this emergency period, for some insurers they may be billed the same as an in-person visit.  Please reach out to your insurance provider with any questions.    If during the course of the call the physician/provider feels a telephone visit is not appropriate, you will not be charged for this service.\"    Patient has given verbal consent for Telephone visit?  Yes    What phone number would you like to be contacted at? [phone]    How would you like to obtain your AVS? Mail a copy    Subjective   [id number]  Elizabeth Hinton is a 40 year old female who presents via phone visit today for the following health issues:  Urinary frequency and irritation for the past several days. She feels that she has had some urinary discomfort coming and going over the past few months, and has had intermittent symptoms of urine leakage, and flank pain. She has a referral to urology, but has not established with them yet.     She has also been taking topamax and Phentermine; currently tolerating well. She is unsure of her current weight. Would like refills of her medication.    HPI        Patient Active Problem List   Diagnosis     Elevated BP without diagnosis of hypertension     PASTORA III (cervical intraepithelial neoplasia III) "     Female stress incontinence     Social anxiety disorder     Other migraine without status migrainosus, intractable     Iron deficiency     Eczema, unspecified type     History reviewed. No pertinent surgical history.    Social History     Tobacco Use     Smoking status: Current Every Day Smoker     Packs/day: 1.00     Smokeless tobacco: Never Used   Substance Use Topics     Alcohol use: No     Family History   Problem Relation Age of Onset     Thyroid Disease Mother      Heart Disease Mother      Aneurysm Father            Reviewed and updated as needed this visit by Provider         Review of Systems   Constitutional, HEENT, cardiovascular, pulmonary, gi and gu systems are negative, except as otherwise noted.       Objective   Reported vitals:  There were no vitals taken for this visit.   healthy, alert and no distress  PSYCH: Alert and oriented times 3; coherent speech, normal   rate and volume, able to articulate logical thoughts, able   to abstract reason, no tangential thoughts, no hallucinations   or delusions  Her affect is normal  RESP: No cough, no audible wheezing, able to talk in full sentences  Remainder of exam unable to be completed due to telephone visits    Diagnostic Test Results:  Labs reviewed in Epic        Assessment/Plan:  1. Dysuria  - ciprofloxacin (CIPRO) 250 MG tablet; Take 1 tablet (250 mg) by mouth 2 times daily for 3 days  Dispense: 6 tablet; Refill: 0    2. Overweight  -Review Adipex continued use at next in-person visit, hopefully in 3 months; if not effective for weight loss will discontinue.   - phentermine (ADIPEX-P) 37.5 MG tablet; Take 1 tablet (37.5 mg) by mouth every morning (before breakfast)  Dispense: 30 tablet; Refill: 3    3. Other migraine without status migrainosus, intractable  - topiramate (TOPAMAX) 100 MG tablet; Take 1 tablet (100 mg) by mouth daily Take 100 mg + 50 mg together at bedtime  Dispense: 90 tablet; Refill: 3  - topiramate (TOPAMAX) 50 MG tablet; Take  one 50 mg at bedtime with one 100 mg tablet  Dispense: 90 tablet; Refill: 3    No follow-ups on file.      Phone call duration:  15  minutes    XIN Sanders CNP

## 2020-09-10 ENCOUNTER — ANCILLARY PROCEDURE (OUTPATIENT)
Dept: GENERAL RADIOLOGY | Facility: CLINIC | Age: 40
End: 2020-09-10
Attending: NURSE PRACTITIONER
Payer: COMMERCIAL

## 2020-09-10 ENCOUNTER — OFFICE VISIT (OUTPATIENT)
Dept: PEDIATRICS | Facility: CLINIC | Age: 40
End: 2020-09-10
Payer: COMMERCIAL

## 2020-09-10 VITALS
RESPIRATION RATE: 14 BRPM | DIASTOLIC BLOOD PRESSURE: 82 MMHG | OXYGEN SATURATION: 100 % | WEIGHT: 191.6 LBS | HEART RATE: 92 BPM | HEIGHT: 63 IN | BODY MASS INDEX: 33.95 KG/M2 | TEMPERATURE: 98.2 F | SYSTOLIC BLOOD PRESSURE: 138 MMHG

## 2020-09-10 DIAGNOSIS — M25.561 CHRONIC PAIN OF RIGHT KNEE: ICD-10-CM

## 2020-09-10 DIAGNOSIS — G89.29 CHRONIC PAIN OF RIGHT KNEE: ICD-10-CM

## 2020-09-10 DIAGNOSIS — Z98.890 HX OF BILATERAL BREAST REDUCTION SURGERY: ICD-10-CM

## 2020-09-10 DIAGNOSIS — N63.0 LUMP OR MASS IN BREAST: Primary | ICD-10-CM

## 2020-09-10 DIAGNOSIS — N64.52 NIPPLE DISCHARGE: ICD-10-CM

## 2020-09-10 DIAGNOSIS — L90.5 SCAR TISSUE: ICD-10-CM

## 2020-09-10 LAB — PROLACTIN SERPL-MCNC: 21 UG/L (ref 3–27)

## 2020-09-10 PROCEDURE — 36415 COLL VENOUS BLD VENIPUNCTURE: CPT | Performed by: NURSE PRACTITIONER

## 2020-09-10 PROCEDURE — 73560 X-RAY EXAM OF KNEE 1 OR 2: CPT | Mod: RT

## 2020-09-10 PROCEDURE — 84146 ASSAY OF PROLACTIN: CPT | Performed by: NURSE PRACTITIONER

## 2020-09-10 PROCEDURE — 99214 OFFICE O/P EST MOD 30 MIN: CPT | Performed by: NURSE PRACTITIONER

## 2020-09-10 ASSESSMENT — MIFFLIN-ST. JEOR: SCORE: 1518.22

## 2020-09-10 NOTE — PROGRESS NOTES
"Subjective     Elizabeth Hinton is a 40 year old female who presents to clinic today for the following health issues:    HPI     Breast Concern  Onset/Duration: Breast lump since 2019; left bresat  Description:   Location: upper inner quadrant  Pain or tenderness: YES- Sometimes;  Unsure if it is cyclic pain or not.  Redness: no  Intensity: mild  Progression of Symptoms: same and might be getting bigger.   Accompanying Signs & Symptoms:  Any lumps in axillary region: no  Movable: YES  Nipple discharge: None  Changes in the skin or nipple: None  On Hormone therapy: no  Does it change with menstrual cycle: no  Previous history of similar problem: None  First degree relative with breast cancer: a negative family history for breast cancer.  Precipitating factors:           Worsened by: None  Alleviating factors:            Improved by: None  Therapies tried and outcome: None    Patient's last menstrual period was 08/27/2020 (approximate).    Patient had a mammogram ordered 12/2019 but did not complete.    History of breast reduction in 2012.  States that she has scars from the surgery that she would like evaluated.  She feels \"pulling\" of her skin on her left breast where the scar/keloid has formed.  Would like a referral to plastic surgery.    Nipple discharge on right nipple, chronic.  Discharge happens when stimulated.  Straw colored. Denies spontaneous discharge or black discharge.    Right knee pain: started in 2014.  States when she was incarcerated in [id number] she was in boot camp daily and worsened.  Was living in [place] where it was evaluated prior to incarceration.  Would like to be evaluated.    Review of Systems   Constitutional, HEENT, cardiovascular, pulmonary, gi and gu systems are negative, except as otherwise noted.      Objective    /82 (BP Location: Right arm, Patient Position: Chair, Cuff Size: Adult Regular)   Pulse 92   Temp 98.2  F (36.8  C) (Oral)   Resp 14   Ht 1.6 m (5' 3\")   Wt " "86.9 kg (191 lb 9.6 oz)   LMP 08/27/2020 (Approximate)   SpO2 100%   BMI 33.94 kg/m    Body mass index is 33.94 kg/m .       Physical Exam   GENERAL: healthy, alert and no distress  RESP: lungs clear to auscultation - no rales, rhonchi or wheezes  BREAST: left and right breast--normal without tenderness or nipple discharge and no palpable axillary masses or adenopathy.    Left breast mass 12 o'clock position near nipple, 0.25cm.  Non tender. No redness. Movable.   CV: regular rate and rhythm, normal S1 S2, no S3 or S4, no murmur, click or rub, no peripheral edema and peripheral pulses strong  ABDOMEN: soft, nontender, no hepatosplenomegaly, no masses and bowel sounds normal  MS: no gross musculoskeletal defects noted, no edema  SKIN: no suspicious lesions or rashes and keloid - breast  PSYCH: mentation appears normal, affect normal/bright        Assessment & Plan     1. Lump or mass in breast  Likely a lipoma or cyst.  - *MA Screening Digital Bilateral; Future    2. Nipple discharge  No discharge on exam.  Will get prolactin and evaluate from there.  - Prolactin  - *MA Screening Digital Bilateral; Future    3. Chronic pain of right knee  - MR Knee Right w/o Contrast; Future  - Orthopedic & Spine  Referral; Future  - XR Knee Right 1/2 Views  BMI:   Estimated body mass index is 33.94 kg/m  as calculated from the following:    Height as of this encounter: 1.6 m (5' 3\").    Weight as of this encounter: 86.9 kg (191 lb 9.6 oz).     4. Hx of bilateral breast reduction surgery  - PLASTIC SURGERY REFERRAL    5. Scar tissue  - PLASTIC SURGERY REFERRAL       Tobacco Cessation:   reports that she has been smoking. She has been smoking about 1.00 pack per day. She has never used smokeless tobacco.      POC  See Patient Instructions  RTC as needed  Referrals ordered  Return in about 3 months (around 12/10/2020) for Preventive Visit.    Dione Lux NP  [facility]      "

## 2020-09-23 ENCOUNTER — HOSPITAL ENCOUNTER (OUTPATIENT)
Dept: ULTRASOUND IMAGING | Facility: CLINIC | Age: 40
End: 2020-09-23
Attending: NURSE PRACTITIONER
Payer: COMMERCIAL

## 2020-09-23 ENCOUNTER — HOSPITAL ENCOUNTER (OUTPATIENT)
Dept: MRI IMAGING | Facility: CLINIC | Age: 40
End: 2020-09-23
Attending: NURSE PRACTITIONER
Payer: COMMERCIAL

## 2020-09-23 ENCOUNTER — HOSPITAL ENCOUNTER (OUTPATIENT)
Dept: MAMMOGRAPHY | Facility: CLINIC | Age: 40
End: 2020-09-23
Attending: NURSE PRACTITIONER
Payer: COMMERCIAL

## 2020-09-23 DIAGNOSIS — G89.29 CHRONIC PAIN OF RIGHT KNEE: ICD-10-CM

## 2020-09-23 DIAGNOSIS — N64.52 NIPPLE DISCHARGE: ICD-10-CM

## 2020-09-23 DIAGNOSIS — N63.0 LUMP OR MASS IN BREAST: ICD-10-CM

## 2020-09-23 DIAGNOSIS — M25.561 CHRONIC PAIN OF RIGHT KNEE: ICD-10-CM

## 2020-09-23 PROCEDURE — 73721 MRI JNT OF LWR EXTRE W/O DYE: CPT | Mod: RT

## 2020-09-23 PROCEDURE — 76642 ULTRASOUND BREAST LIMITED: CPT | Mod: 50

## 2020-09-23 PROCEDURE — G0279 TOMOSYNTHESIS, MAMMO: HCPCS

## 2020-09-24 NOTE — TELEPHONE ENCOUNTER
FUTURE VISIT INFORMATION      FUTURE VISIT INFORMATION:    Date: 12/16/20    Time: 10:00am    [place] [facility] [place] INFORMATION:    Referring provider: Dione Lux NP     Referring providers clinic:  [facility]    Reason for visit/diagnosis  Hx of bilateral breast reduction surgery     RECORDS REQUESTED FROM:       Clinic name Comments Records Status Imaging Status   [facility] Referral/OV 9/10 EPIC

## 2020-09-29 ENCOUNTER — OFFICE VISIT (OUTPATIENT)
Dept: ORTHOPEDICS | Facility: CLINIC | Age: 40
End: 2020-09-29
Payer: COMMERCIAL

## 2020-09-29 VITALS
SYSTOLIC BLOOD PRESSURE: 120 MMHG | WEIGHT: 191 LBS | DIASTOLIC BLOOD PRESSURE: 89 MMHG | HEIGHT: 63 IN | BODY MASS INDEX: 33.84 KG/M2

## 2020-09-29 DIAGNOSIS — M17.11 PRIMARY OSTEOARTHRITIS OF RIGHT KNEE: Primary | ICD-10-CM

## 2020-09-29 PROCEDURE — 99204 OFFICE O/P NEW MOD 45 MIN: CPT | Performed by: ORTHOPAEDIC SURGERY

## 2020-09-29 ASSESSMENT — MIFFLIN-ST. JEOR: SCORE: 1515.5

## 2020-09-29 NOTE — LETTER
9/29/2020         RE: Elizabeth Lopez  [address] [place] [address]        Dear Colleague,    Thank you for referring your patient, Elizabeth Lopez, to the [facility] ORTHOPEDIC SURGERY. Please see a copy of my visit note below.    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 Symptoms started about 8 years ago. There was a precipitating event where she fell and twisted her knee. The pain is located to the right anterior knee. Worst pain is rated a 5 of 10, and current pain is rated at 0 of 10. Symptoms are worsened by walking, standing for long periods and flexion. Symptoms are improved with being at a lighter weight, massage and heat. Patient has tried cortisone injection with 80% relief (believes her last one was in 2013). Associated symptoms include popping and pain. Describes it as a rubbing sensation. Notably, the patient has had no other injuries. No other concerns or complaints at this time.    PAST MEDICAL HISTORY:  Past Medical History:   Diagnosis Date     Abnormal Pap smear of cervix 10/18/2018    10/18/18: See problem list.      Cervical high risk HPV (human papillomavirus) test positive 10/18/2018, 6/12/19    See problem list     Hypertension      Migraine      CURRENT MEDICATIONS:  Current Outpatient Medications   Medication     ammonium lactate (AMLACTIN) 12 % external cream     Emollient (AQUAPHOR ADVANCED THERAPY) OINT     ferrous gluconate (FERGON) 324 (38 Fe) MG tablet     fluocinonide (LIDEX) 0.05 % external ointment     ibuprofen (ADVIL/MOTRIN) 600 MG tablet     nicotine (NICODERM CQ) 21 MG/24HR 24 hr patch     nicotine (NICODERM CQ) 7 MG/24HR 24 hr patch     nicotine (NICORETTE) 2 MG gum     order for  "DME     phentermine (ADIPEX-P) 37.5 MG tablet     topiramate (TOPAMAX) 100 MG tablet     topiramate (TOPAMAX) 50 MG tablet     triamcinolone (ARISTOCORT HP) 0.5 % external cream     vitamin D3 (CHOLECALCIFEROL) 2000 units (50 mcg) tablet     No current facility-administered medications for this visit.      ALLERGIES:    No Known Allergies    PAST SURGICAL HISTORY:  1. Breast reduction surgery    FAMILY HISTORY: No known family history of bleeding, clotting, or anesthesia related complications.    SOCIAL HISTORY: Patient lives at home with her iker and three children in [place], [place]. She works in [employer] at [id number]. She does not exercise regularly but she will occasionally play basketball with her kids. She used to play basketball and track in high school.    TOXIC HABITS:  I spoke with Elizabeth today regarding tobacco use and they informed me that they previously used tobacco products, but do not currently. She quit approximately 6 months ago.    REVIEW OF SYSTEMS:  General: Denies fevers, chills, or night sweats.  Skin: Denies rashes or lesions.  Head: Denies headache or dizziness.  Eyes: Denies vision changes or eye pain.  Ears: Denies ear pain or decreased hearing.  Nose: Denies nose bleeding or sinus pain.  Mouth & Throat: Denies bleeding gums or sore throat.  Neck: Denies neck pain or stiffness.  Respiratory: Denies cough, wheezing, sob, or hemoptysis.  Cardiovascular: Denies chest pain, chest pressure, or palpitations.   Gastrointestinal: Denies abdominal pain, nausea, vomiting, diarrhea, or constipation.  Genitourinary: Denies difficulty or pain with urination.   Musculoskeletal: As noted above in the HPI, otherwise normal.  Neuro: Denies paralysis, weakness, paresthesias, or speech difficulty.  Lymphatics: Denies lymphadenopathy.  Psych: Denies anxiety, sadness, or irritability.    PHYSICAL EXAM:  Patient is 5' 3\" and weighs 191 lbs 0 oz. Ht 1.6 m (5' 3\")   Wt 86.6 kg (191 lb)   BMI 33.83 kg/m  "   Constitutional: Well-developed, well-nourished, healthy appearing female.  Psychiatric:  Oriented to person, place and time.  Mood and affect congruent.  Skin: Warm, dry, and without rashes.  HEENT: Normocephalic, atraumatic. Extraocular movements intact. Moist mucous membranes.  Cardiac: Well perfused extremities, strong 2+ peripheral pulses. No edema.   Pulmonary: Non-labored respirations on room air without audible wheezes.   Abdomen: Soft, nontender.  Musculoskeletal: Patient ambulates with a slow, symmetric, and steady gait. No joint, bone or muscle abnormalities.  Active range of motion of the knees is 0 to 135 on the right, compared to 0 to 140 on the left.  Lachman 2B, positive anterior drawer, pivot shift 2+ on the right. Trace patellofemoral crepitus bilaterally.  The right knee has medial joint line tenderness and positive Lindsay.  She has no lateral joint line tenderness.  No varus or valgus instability in full extension or 30 degrees of flexion, posterior drawer or posterolateral instability bilaterally.  The neurovascular exam is intact and skin is normal.     IMAGING:   All imaging was personally reviewed by me.    Right knee AP and lateral weightbearing views dated 9/10/2020 demonstrate bone-on-bone medial compartment osteoarthritis with marginal osteophytes about the medial and lateral compartments.  Mild patellofemoral disease as well.    Right knee MRI dated 9/23/2020 was also personally reviewed by me.  This demonstrates diffuse grade 4 chondral wear of the medial compartment with bone marrow edema and subchondral fissuring.  The lateral and patellofemoral compartments have minimal chondral wear with some marginal osteophytes noted.  There is a moderate joint space effusion.  There is a chronic proximal ACL tear and a chronic appearing bucket-handle tear of the medial meniscus with degenerative remaining meniscal tearing and meniscus extrusion.    IMPRESSION: 40 year old-year-old female with  right knee medial compartment osteoarthritis, chronic ACL and medial meniscus tear.     PLAN:   I discussed the treatment options with Elizabeth includin. Living with the symptoms.  2. Continued non-operative management.  3. Surgical intervention.     I discussed the history, clinical examination, and imaging findings with Elizabeth in detail today. Elizabeth has degenerative arthritis of her knee with a known associated medial meniscal tear and chronic ACL tear. I have told her that ultimately for a person with early degenerative changes about the knee, like she has (the cartilage wear and tear), that the mainstay of treatment are things like time, rest, relative rest, activity modification, physical therapy or home strengthening (specifically quadriceps, hamstrings, and core musculature) programs, weight loss with or without a dietician/nutritionist assistance,  bracing if desired/tolerated, prescription strength oral anti-inflammatories if they can be safely tolerated (may need to discuss with primary care provider), Tylenol, and intermittent use of corticosteroid injections and consideration of viscosupplementation injections. In general, arthroscopic surgery is not helpful in the management of this clinical entity. While I do think that it is possible that she may see some improvement from an arthroscopic procedure for her meniscus, it will not help her with the degenerative osteoarthritis portion of the disease. Elizabeth understands this.  Lastly, I do not feel that this extent of knee arthritis, clinical symptoms, or disability is advanced enough to warrant consideration for arthroplasty, however if she was to fail a course of non-operative treatment she may be a candidate for a high tibial osteotomy.     We initially discussed the possibility of doing surgery; however, after a longer discussion and through shared decision making we ultimately elected to proceed with a renewed focus on maximizing  non-operative symptom management, to include:    1. Activity modification, relative rest, time. Maximize the things she can do, minimize the things that aggravate symptoms.   2. Quadriceps, hamstrings, and core muscle strengthening. Elizabeth would like to do this under the direction of a physical therapist. A referral was placed for this today.  3. Weight loss. Elizabeth would like a Nutritionist/Dietician referral today, and this order was placed.  4. Over the counter Tylenol as needed for pain.  5. Over the counter NSAID therapy.  6. Discussed  bracing, and Elizabeth would like this today. An orthotics order was placed for a medial compartment  brace.  7. Lastly, we discussed the risks, benefits, and alternatives to a right knee corticosteroid injection today. Elizabeth politely declined this today, but will call if she wants to do this in the future.  8. Return to clinic on an as needed basis.      At the conclusion of the office visit, Elizabeth verbally acknowledged that I answered all of her questions satisfactorily.    Again, thank you for allowing me to participate in the care of your patient.        Sincerely,        Phu Varma MD

## 2020-09-29 NOTE — PROGRESS NOTES
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 Symptoms started about 8 years ago. There was a precipitating event where she fell and twisted her knee. The pain is located to the right anterior knee. Worst pain is rated a 5 of 10, and current pain is rated at 0 of 10. Symptoms are worsened by walking, standing for long periods and flexion. Symptoms are improved with being at a lighter weight, massage and heat. Patient has tried cortisone injection with 80% relief (believes her last one was in 2013). Associated symptoms include popping and pain. Describes it as a rubbing sensation. Notably, the patient has had no other injuries. No other concerns or complaints at this time.    PAST MEDICAL HISTORY:  Past Medical History:   Diagnosis Date     Abnormal Pap smear of cervix 10/18/2018    10/18/18: See problem list.      Cervical high risk HPV (human papillomavirus) test positive 10/18/2018, 6/12/19    See problem list     Hypertension      Migraine      CURRENT MEDICATIONS:  Current Outpatient Medications   Medication     ammonium lactate (AMLACTIN) 12 % external cream     Emollient (AQUAPHOR ADVANCED THERAPY) OINT     ferrous gluconate (FERGON) 324 (38 Fe) MG tablet     fluocinonide (LIDEX) 0.05 % external ointment     ibuprofen (ADVIL/MOTRIN) 600 MG tablet     nicotine (NICODERM CQ) 21 MG/24HR 24 hr patch     nicotine (NICODERM CQ) 7 MG/24HR 24 hr patch     nicotine (NICORETTE) 2 MG gum     order for DME     phentermine (ADIPEX-P) 37.5 MG tablet     topiramate (TOPAMAX) 100 MG tablet     topiramate (TOPAMAX) 50 MG tablet     triamcinolone (ARISTOCORT HP) 0.5 % external cream     vitamin D3 (CHOLECALCIFEROL) 2000 units (50 mcg) tablet     No current  "facility-administered medications for this visit.      ALLERGIES:    No Known Allergies    PAST SURGICAL HISTORY:  1. Breast reduction surgery    FAMILY HISTORY: No known family history of bleeding, clotting, or anesthesia related complications.    SOCIAL HISTORY: Patient lives at home with her skipe and three children in [place], [place]. She works in [employer] at [employer]. She does not exercise regularly but she will occasionally play basketball with her kids. She used to play basketball and track in high school.    TOXIC HABITS:  I spoke with Elizabeth today regarding tobacco use and they informed me that they previously used tobacco products, but do not currently. She quit approximately 6 months ago.    REVIEW OF SYSTEMS:  General: Denies fevers, chills, or night sweats.  Skin: Denies rashes or lesions.  Head: Denies headache or dizziness.  Eyes: Denies vision changes or eye pain.  Ears: Denies ear pain or decreased hearing.  Nose: Denies nose bleeding or sinus pain.  Mouth & Throat: Denies bleeding gums or sore throat.  Neck: Denies neck pain or stiffness.  Respiratory: Denies cough, wheezing, sob, or hemoptysis.  Cardiovascular: Denies chest pain, chest pressure, or palpitations.   Gastrointestinal: Denies abdominal pain, nausea, vomiting, diarrhea, or constipation.  Genitourinary: Denies difficulty or pain with urination.   Musculoskeletal: As noted above in the HPI, otherwise normal.  Neuro: Denies paralysis, weakness, paresthesias, or speech difficulty.  Lymphatics: Denies lymphadenopathy.  Psych: Denies anxiety, sadness, or irritability.    PHYSICAL EXAM:  Patient is 5' 3\" and weighs 191 lbs 0 oz. Ht 1.6 m (5' 3\")   Wt 86.6 kg (191 lb)   BMI 33.83 kg/m    Constitutional: Well-developed, well-nourished, healthy appearing female.  Psychiatric:  Oriented to person, place and time.  Mood and affect congruent.  Skin: Warm, dry, and without rashes.  HEENT: Normocephalic, atraumatic. Extraocular movements " intact. Moist mucous membranes.  Cardiac: Well perfused extremities, strong 2+ peripheral pulses. No edema.   Pulmonary: Non-labored respirations on room air without audible wheezes.   Abdomen: Soft, nontender.  Musculoskeletal: Patient ambulates with a slow, symmetric, and steady gait. No joint, bone or muscle abnormalities.  Active range of motion of the knees is 0 to 135 on the right, compared to 0 to 140 on the left.  Lachman 2B, positive anterior drawer, pivot shift 2+ on the right. Trace patellofemoral crepitus bilaterally.  The right knee has medial joint line tenderness and positive Lindsay.  She has no lateral joint line tenderness.  No varus or valgus instability in full extension or 30 degrees of flexion, posterior drawer or posterolateral instability bilaterally.  The neurovascular exam is intact and skin is normal.     IMAGING:   All imaging was personally reviewed by me.    Right knee AP and lateral weightbearing views dated 9/10/2020 demonstrate bone-on-bone medial compartment osteoarthritis with marginal osteophytes about the medial and lateral compartments.  Mild patellofemoral disease as well.    Right knee MRI dated 2020 was also personally reviewed by me.  This demonstrates diffuse grade 4 chondral wear of the medial compartment with bone marrow edema and subchondral fissuring.  The lateral and patellofemoral compartments have minimal chondral wear with some marginal osteophytes noted.  There is a moderate joint space effusion.  There is a chronic proximal ACL tear and a chronic appearing bucket-handle tear of the medial meniscus with degenerative remaining meniscal tearing and meniscus extrusion.    IMPRESSION: 40 year old-year-old female with right knee medial compartment osteoarthritis, chronic ACL and medial meniscus tear.     PLAN:   I discussed the treatment options with Elizabeth includin. Living with the symptoms.  2. Continued non-operative management.  3. Surgical intervention.      I discussed the history, clinical examination, and imaging findings with Elizabeth in detail today. Elizabeth has degenerative arthritis of her knee with a known associated medial meniscal tear and chronic ACL tear. I have told her that ultimately for a person with early degenerative changes about the knee, like she has (the cartilage wear and tear), that the mainstay of treatment are things like time, rest, relative rest, activity modification, physical therapy or home strengthening (specifically quadriceps, hamstrings, and core musculature) programs, weight loss with or without a dietician/nutritionist assistance,  bracing if desired/tolerated, prescription strength oral anti-inflammatories if they can be safely tolerated (may need to discuss with primary care provider), Tylenol, and intermittent use of corticosteroid injections and consideration of viscosupplementation injections. In general, arthroscopic surgery is not helpful in the management of this clinical entity. While I do think that it is possible that she may see some improvement from an arthroscopic procedure for her meniscus, it will not help her with the degenerative osteoarthritis portion of the disease. Elizabeth understands this.  Lastly, I do not feel that this extent of knee arthritis, clinical symptoms, or disability is advanced enough to warrant consideration for arthroplasty, however if she was to fail a course of non-operative treatment she may be a candidate for a high tibial osteotomy.     We initially discussed the possibility of doing surgery; however, after a longer discussion and through shared decision making we ultimately elected to proceed with a renewed focus on maximizing non-operative symptom management, to include:    1. Activity modification, relative rest, time. Maximize the things she can do, minimize the things that aggravate symptoms.   2. Quadriceps, hamstrings, and core muscle strengthening. Elizabeth would like to do  this under the direction of a physical therapist. A referral was placed for this today.  3. Weight loss. Elizabeth would like a Nutritionist/Dietician referral today, and this order was placed.  4. Over the counter Tylenol as needed for pain.  5. Over the counter NSAID therapy.  6. Discussed  bracing, and Elizabeth would like this today. An orthotics order was placed for a medial compartment  brace.  7. Lastly, we discussed the risks, benefits, and alternatives to a right knee corticosteroid injection today. Elizabeth politely declined this today, but will call if she wants to do this in the future.  8. Return to clinic on an as needed basis.      At the conclusion of the office visit, Elizabeth verbally acknowledged that I answered all of her questions satisfactorily.

## 2020-09-29 NOTE — PATIENT INSTRUCTIONS
1. Primary osteoarthritis of right knee        Physical Therapy orders have been placed with [facility].  You can call [phone] to schedule at your convenience.     Referral for Orthotics - knee brace    Nutrition Referral    Follow up with Dr. Varma as needed. Can come in at any time for an injection     Call my office with any questions or concerns, [phone].

## 2020-10-01 ENCOUNTER — THERAPY VISIT (OUTPATIENT)
Dept: PHYSICAL THERAPY | Facility: CLINIC | Age: 40
End: 2020-10-01
Attending: ORTHOPAEDIC SURGERY
Payer: COMMERCIAL

## 2020-10-01 DIAGNOSIS — M25.561 RIGHT KNEE PAIN: ICD-10-CM

## 2020-10-01 DIAGNOSIS — M17.11 PRIMARY OSTEOARTHRITIS OF RIGHT KNEE: ICD-10-CM

## 2020-10-01 PROCEDURE — 97110 THERAPEUTIC EXERCISES: CPT | Mod: GP | Performed by: PHYSICAL THERAPIST

## 2020-10-01 PROCEDURE — 97161 PT EVAL LOW COMPLEX 20 MIN: CPT | Mod: GP | Performed by: PHYSICAL THERAPIST

## 2020-10-01 ASSESSMENT — ACTIVITIES OF DAILY LIVING (ADL)
KNEEL ON THE FRONT OF YOUR KNEE: ACTIVITY IS NOT DIFFICULT
GIVING WAY, BUCKLING OR SHIFTING OF KNEE: THE SYMPTOM AFFECTS MY ACTIVITY SLIGHTLY
GO DOWN STAIRS: ACTIVITY IS SOMEWHAT DIFFICULT
HOW_WOULD_YOU_RATE_THE_OVERALL_FUNCTION_OF_YOUR_KNEE_DURING_YOUR_USUAL_DAILY_ACTIVITIES?: ABNORMAL
STAND: ACTIVITY IS SOMEWHAT DIFFICULT
KNEE_ACTIVITY_OF_DAILY_LIVING_SCORE: 61.43
KNEE_ACTIVITY_OF_DAILY_LIVING_SUM: 43
LIMPING: THE SYMPTOM AFFECTS MY ACTIVITY MODERATELY
WEAKNESS: THE SYMPTOM AFFECTS MY ACTIVITY MODERATELY
HOW_WOULD_YOU_RATE_THE_CURRENT_FUNCTION_OF_YOUR_KNEE_DURING_YOUR_USUAL_DAILY_ACTIVITIES_ON_A_SCALE_FROM_0_TO_100_WITH_100_BEING_YOUR_LEVEL_OF_KNEE_FUNCTION_PRIOR_TO_YOUR_INJURY_AND_0_BEING_THE_INABILITY_TO_PERFORM_ANY_OF_YOUR_USUAL_DAILY_ACTIVITIES?: 50
GO UP STAIRS: ACTIVITY IS SOMEWHAT DIFFICULT
PAIN: THE SYMPTOM AFFECTS MY ACTIVITY SLIGHTLY
SIT WITH YOUR KNEE BENT: ACTIVITY IS SOMEWHAT DIFFICULT
AS_A_RESULT_OF_YOUR_KNEE_INJURY,_HOW_WOULD_YOU_RATE_YOUR_CURRENT_LEVEL_OF_DAILY_ACTIVITY?: ABNORMAL
STIFFNESS: THE SYMPTOM AFFECTS MY ACTIVITY SLIGHTLY
SQUAT: ACTIVITY IS SOMEWHAT DIFFICULT
SWELLING: THE SYMPTOM AFFECTS MY ACTIVITY SLIGHTLY
RISE FROM A CHAIR: ACTIVITY IS MINIMALLY DIFFICULT
WALK: ACTIVITY IS SOMEWHAT DIFFICULT
RAW_SCORE: 43
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
## 2020-10-08 ENCOUNTER — THERAPY VISIT (OUTPATIENT)
Dept: PHYSICAL THERAPY | Facility: CLINIC | Age: 40
End: 2020-10-08
Payer: COMMERCIAL

## 2020-10-08 DIAGNOSIS — M25.561 RIGHT KNEE PAIN: Primary | ICD-10-CM

## 2020-10-08 PROCEDURE — 97112 NEUROMUSCULAR REEDUCATION: CPT | Mod: GP | Performed by: PHYSICAL THERAPIST

## 2020-10-08 PROCEDURE — 97110 THERAPEUTIC EXERCISES: CPT | Mod: GP | Performed by: PHYSICAL THERAPIST

## 2020-10-23 ENCOUNTER — TELEPHONE (OUTPATIENT)
Dept: PLASTIC SURGERY | Facility: CLINIC | Age: 40
End: 2020-10-23

## 2020-10-27 ENCOUNTER — OFFICE VISIT (OUTPATIENT)
Dept: PLASTIC SURGERY | Facility: CLINIC | Age: 40
End: 2020-10-27
Attending: NURSE PRACTITIONER
Payer: COMMERCIAL

## 2020-10-27 VITALS
WEIGHT: 189.1 LBS | RESPIRATION RATE: 16 BRPM | DIASTOLIC BLOOD PRESSURE: 113 MMHG | OXYGEN SATURATION: 100 % | BODY MASS INDEX: 33.5 KG/M2 | HEART RATE: 92 BPM | SYSTOLIC BLOOD PRESSURE: 167 MMHG | HEIGHT: 63 IN | TEMPERATURE: 98.2 F

## 2020-10-27 DIAGNOSIS — E65 ADIPOSITY, LOCALIZED: Primary | ICD-10-CM

## 2020-10-27 PROCEDURE — G0463 HOSPITAL OUTPT CLINIC VISIT: HCPCS

## 2020-10-27 PROCEDURE — 99203 OFFICE O/P NEW LOW 30 MIN: CPT | Performed by: PLASTIC SURGERY

## 2020-10-27 RX ORDER — HYDROCHLOROTHIAZIDE 25 MG/1
TABLET ORAL
COMMUNITY
Start: 2020-04-15 | End: 2021-05-18

## 2020-10-27 ASSESSMENT — MIFFLIN-ST. JEOR: SCORE: 1503.7

## 2020-10-27 ASSESSMENT — PAIN SCALES - GENERAL: PAINLEVEL: NO PAIN (0)

## 2020-10-27 NOTE — NURSING NOTE
"Oncology Rooming Note    October 27, 2020 10:22 AM   Elizabeth Lopez is a 40 year old female who presents for:    Chief Complaint   Patient presents with     Oncology Clinic Visit     New; Surgery consultation     Initial Vitals: BP (!) 163/110   Pulse 92   Temp 98.2  F (36.8  C) (Oral)   Resp 16   Ht 1.595 m (5' 2.8\")   Wt 85.8 kg (189 lb 1.6 oz)   LMP 10/22/2020   SpO2 100%   BMI 33.71 kg/m   Estimated body mass index is 33.71 kg/m  as calculated from the following:    Height as of this encounter: 1.595 m (5' 2.8\").    Weight as of this encounter: 85.8 kg (189 lb 1.6 oz). Body surface area is 1.95 meters squared.  No Pain (0) Comment: Data Unavailable   Patient's last menstrual period was 10/22/2020.  Allergies reviewed: Yes  Medications reviewed: Yes    Medications: Medication refills not needed today.  Pharmacy name entered into [employer]: [id number] - [place], [place] - [address] AT [address]              "

## 2020-10-27 NOTE — LETTER
10/27/2020         RE: Elizabeth Lopez  [address] [place] [address]        Dear Colleague,    Thank you for referring your patient, Elizabeth Lopez, to the [facility]. Please see a copy of my visit note below.    CONSULT NOTE      REFERRING PROVIDER:  TONO Carrizales      PRESENTING COMPLAINT:  Consultation for adiposity in the lateral chest and in the lateral flank areas.      HISTORY OF PRESENTING COMPLAINT:  Ms. Lopez is 40 years old.  She underwent a breast reduction in 2014 and a panniculectomy in 2012.  Over the last year, she has gained about 30-40 pounds.  After her breast reduction, she went from a D cup to a B-C cup.  She feels that overall size wise, she is happy, but what she does not like is the excess adiposity in the lateral chest area that has worsened over the last year.  Additionally, she has a small lump in her left breast that has been worked up extensively with ultrasounds and mammograms that both ensured to be a benign cyst.  She feels it is growing in size.  Additionally, she is unhappy with the flank regions of both lower torsos, which are bigger and more prominent than they used to be.  She is here to discuss options for improving that.      PAST MEDICAL HISTORY:  Hypertension, migraines.      PAST SURGICAL HISTORY:  Panniculectomy, breast reduction.      MEDICATIONS:  Hydrochlorothiazide and topiramate.      ALLERGIES:  Nil.      SOCIAL HISTORY:  She used to smoke about a pack a day for multiple years, quit a year ago.  She is on no nicotine.  She drinks socially.  Lives in [place].  Works at [employer].      REVIEW OF SYSTEMS:  Denies chest pain, shortness of breath, MI, CVA, DVT and PE.      PHYSICAL EXAMINATION:  Vital signs are stable.  She is afebrile, in no obvious distress.  She is 5 feet 3 inches, 189 pounds, BMI of 33.7 kg/m2.  On examination of her breasts, she has symmetric-looking breasts, grade 0 ptoses.  A  palpable nodule in the retroareolar area of the left side approximately 1-1.5 cm.  It is mobile, benign appearing.  In her lateral chest area, she has some fullness, but no mary ann folds of skin.  Similarly, she has some adiposity in prominent flanks and upper hips.  Her abdomen is quite flat.  Has some asymmetries in terms of adiposity, but no excess skin per se.      ASSESSMENT AND PLAN:  Based upon the above findings, a diagnosis of status post breast reduction and panniculectomy with weight gain, now with some adiposity in the lateral chest and flank regions, was made.  I think from an anatomic standpoint, she would benefit the most with liposuction.  Excision of those areas is going to result in major scarring and probably not take care of the adiposity that concerns her.  I was very clear that with an aggressive liposuction, she may end up with some excess skin that may not retract fully and may require excision in the future.  She understood.  We will give her quotes for the liposuction.  If she wants to proceed, she will let us know, and I will be happy to do so.  All questions were answered.  She was happy with the visit.      Total time spent with the patient was 30 minutes, of which more than half was counseling.      cc:   TONO Carrizales   [facility]-[address] [address]           Again, thank you for allowing me to participate in the care of your patient.        Sincerely,        PEYTON Fernandez MD

## 2020-10-27 NOTE — PROGRESS NOTES
CONSULT NOTE      REFERRING PROVIDER:  TONO Carrizales      PRESENTING COMPLAINT:  Consultation for adiposity in the lateral chest and in the lateral flank areas.      HISTORY OF PRESENTING COMPLAINT:  Ms. Lopez is 40 years old.  She underwent a breast reduction in 2014 and a panniculectomy in 2012.  Over the last year, she has gained about 30-40 pounds.  After her breast reduction, she went from a D cup to a B-C cup.  She feels that overall size wise, she is happy, but what she does not like is the excess adiposity in the lateral chest area that has worsened over the last year.  Additionally, she has a small lump in her left breast that has been worked up extensively with ultrasounds and mammograms that both ensured to be a benign cyst.  She feels it is growing in size.  Additionally, she is unhappy with the flank regions of both lower torsos, which are bigger and more prominent than they used to be.  She is here to discuss options for improving that.      PAST MEDICAL HISTORY:  Hypertension, migraines.      PAST SURGICAL HISTORY:  Panniculectomy, breast reduction.      MEDICATIONS:  Hydrochlorothiazide and topiramate.      ALLERGIES:  Nil.      SOCIAL HISTORY:  She used to smoke about a pack a day for multiple years, quit a year ago.  She is on no nicotine.  She drinks socially.  Lives in [place].  Works at [employer].      REVIEW OF SYSTEMS:  Denies chest pain, shortness of breath, MI, CVA, DVT and PE.      PHYSICAL EXAMINATION:  Vital signs are stable.  She is afebrile, in no obvious distress.  She is 5 feet 3 inches, 189 pounds, BMI of 33.7 kg/m2.  On examination of her breasts, she has symmetric-looking breasts, grade 0 ptoses.  A palpable nodule in the retroareolar area of the left side approximately 1-1.5 cm.  It is mobile, benign appearing.  In her lateral chest area, she has some fullness, but no mary ann folds of skin.  Similarly, she has some adiposity in prominent flanks and upper hips.  Her  abdomen is quite flat.  Has some asymmetries in terms of adiposity, but no excess skin per se.      ASSESSMENT AND PLAN:  Based upon the above findings, a diagnosis of status post breast reduction and panniculectomy with weight gain, now with some adiposity in the lateral chest and flank regions, was made.  I think from an anatomic standpoint, she would benefit the most with liposuction.  Excision of those areas is going to result in major scarring and probably not take care of the adiposity that concerns her.  I was very clear that with an aggressive liposuction, she may end up with some excess skin that may not retract fully and may require excision in the future.  She understood.  We will give her quotes for the liposuction.  If she wants to proceed, she will let us know, and I will be happy to do so.  All questions were answered.  She was happy with the visit.      Total time spent with the patient was 30 minutes, of which more than half was counseling.      cc:   TONO Carrizales   [address] [address]

## 2020-10-28 DIAGNOSIS — E65 ADIPOSITY, LOCALIZED: Primary | ICD-10-CM

## 2020-10-29 ENCOUNTER — PATIENT OUTREACH (OUTPATIENT)
Dept: SURGERY | Facility: CLINIC | Age: 40
End: 2020-10-29

## 2020-10-29 NOTE — TELEPHONE ENCOUNTER
New Patient Oncology Nurse Navigator Note     Referring provider: Dr. Fernandez     Referring Clinic/Organization: [facility]     Referred to: Surgical Oncology - Breast Surgery    Requested provider (if applicable): Dr. Tiffany Sanchez     Referral Received: 10/29/20       Evaluation for : Growing benign breast mass.      Clinical History (per Nurse review of records provided):      - enlarging benign cyst.     Clinical Assessment / Barriers to Care (Per Nurse):    None at this time.       Records Location:     [facility]     Records Needed:     NONE AT THIS TIME      Additional testing needed prior to consult:     NONE AT THIS TIME    Referral updates and Plan:       Consult with Surgical Oncology      10/29/2020 3:29 PM - attempted to reach patient to discuss referral and scheduling consult with Dr. Sanchez. Unable to reach patient and voicemail full so unable to leave message. Updated referral notes for scheduling to contact patient and schedule for first available consult with Dr. Sanchez. Should be face to face and 30 mins visit.         Lily Pedraza, RN, BSN   Surgical Oncology New Patient Nurse Navigator  [facility]  [phone]

## 2020-10-30 ENCOUNTER — VIRTUAL VISIT (OUTPATIENT)
Dept: SURGERY | Facility: CLINIC | Age: 40
End: 2020-10-30
Payer: COMMERCIAL

## 2020-10-30 ENCOUNTER — NURSE TRIAGE (OUTPATIENT)
Dept: PEDIATRICS | Facility: CLINIC | Age: 40
End: 2020-10-30

## 2020-10-30 VITALS — HEIGHT: 63 IN | WEIGHT: 189 LBS | BODY MASS INDEX: 33.49 KG/M2

## 2020-10-30 DIAGNOSIS — E66.811 CLASS 1 OBESITY DUE TO EXCESS CALORIES WITH SERIOUS COMORBIDITY AND BODY MASS INDEX (BMI) OF 33.0 TO 33.9 IN ADULT: Primary | ICD-10-CM

## 2020-10-30 DIAGNOSIS — N39.3 FEMALE STRESS INCONTINENCE: ICD-10-CM

## 2020-10-30 DIAGNOSIS — G89.29 CHRONIC PAIN OF RIGHT KNEE: ICD-10-CM

## 2020-10-30 DIAGNOSIS — E66.09 CLASS 1 OBESITY DUE TO EXCESS CALORIES WITH SERIOUS COMORBIDITY AND BODY MASS INDEX (BMI) OF 33.0 TO 33.9 IN ADULT: Primary | ICD-10-CM

## 2020-10-30 DIAGNOSIS — G47.9 SLEEP DISTURBANCE: ICD-10-CM

## 2020-10-30 DIAGNOSIS — I10 HYPERTENSION, UNSPECIFIED TYPE: ICD-10-CM

## 2020-10-30 DIAGNOSIS — M25.561 CHRONIC PAIN OF RIGHT KNEE: ICD-10-CM

## 2020-10-30 PROCEDURE — 99205 OFFICE O/P NEW HI 60 MIN: CPT | Mod: 95 | Performed by: PHYSICIAN ASSISTANT

## 2020-10-30 ASSESSMENT — MIFFLIN-ST. JEOR: SCORE: 1503.25

## 2020-10-30 NOTE — Clinical Note
Thank you for the referral.  She will need some follow up with you before weight loss medications can be prescribed

## 2020-10-30 NOTE — PROGRESS NOTES
"Elizabeth Lopez is a 40 year old female who is being evaluated via a billable video visit.      The patient has been notified of following:     \"This video visit will be conducted via a call between you and your physician/provider. We have found that certain health care needs can be provided without the need for an in-person physical exam.  This service lets us provide the care you need with a video conversation.  If a prescription is necessary we can send it directly to your pharmacy.  If lab work is needed we can place an order for that and you can then stop by our lab to have the test done at a later time.    Video visits are billed at different rates depending on your insurance coverage.  Please reach out to your insurance provider with any questions.    If during the course of the call the physician/provider feels a video visit is not appropriate, you will not be charged for this service.\"    Patient has given verbal consent for Video visit? Yes  How would you like to obtain your AVS? MyChart  If you are dropped from the video visit, the video invite should be resent to: Text to cell phone: [phone]  Will anyone else be joining your video visit? No        Video-Visit Details    Type of service:  Video Visit    Video Start Time: 11:15  Video End Time: 12:15    Originating Location (pt. Location): Home    Distant Location (provider location):  [facility] [place]     Platform used for Video Visit: Jeramie Cortez PA-C          New Virtual Medical Weight Management Consult      PATIENT:  Elizabeth Lopez  MRN:         [id number]  :         1980  MELINA:         10/30/2020          Dear Dione Lux NP,    I had the pleasure of seeing your patient, Elizabeth Lopez. Full intake/assessment was done to determine barriers to weight loss success and develop a treatment plan. Elizabeth Lopez is a 40 year old female interested in treatment of " "medical problems associated with excess weight. She has a height of 5' 2.8\"[pt reported[, a weight of 189 lbs 0 oz, and the calculated Body mass index is 33.69 kg/m .    In the past year has gained about 40 lbs.  This is very concerning for her. 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  Eats on the go, skip meals and eats more at the end of the day.  States she has no energy.  Feels like her \"bones hurt\".  Constantly tired.   Her thyroid level was checked in 2018 WNL. Can have heavy periods.    Was on phentermine given to her by her primary until moved.  Thinks om phentermine for aboit 6 months.  Felt like it helped at first but not anymore.  Has been topamax for years for migraines.        We discussed the role of pharmacological agents in the treatment of obesity and the \"off-label\" use of medications in this practice. We discussed the risks and benefits of each. We discussed indications, contraindications, potential side effects, and estimated costs of each. Discussed that medications must always be used together with lifestyle changes such as improvements in diet choices, portion control and establishing and maintaining a regular exercise program.      All of patients questions about the weight loss program were answered fully.        ASSESSMENT/PLAN:  Obesity BMI 33  Elevated blood pressure  Stress incontinence  fatigue    Will order hemoglobin A1C and Vitamin D  Sleep study referral placed     Spent a good portion of the visit discussing her current health concerns.  She has agreed to make an appointment today to see her primary regarding her chest pain, fatigue, and depression.  IF her primary deems appropriate and medications are prescribed for depression patient can mention possibly trying wellbutrin.      Patient was interested is starting a weight loss medication.  Will hold off until she has her chest pain and extreme fatigue evaluated.  Labs for hemoglobin A1C and Vitamin D were also placed " "today.  She is not currently a candidate for phentermine because of her elevated blood pressure and chest pain.  She also felt the effects were waining.  Could be a candidate for wellbutrin, metformin or GLP-1.  She is currently taking topamax.    Strongly recommended she talk to the dietitian but not sure she will. Let her know it is important for her fatigue to improve so she will have more energy to make lifestyle changes.  Sleep study referral placed.       She did express an interest for bariatric surgery but she does not qualify with her current BMI      She has the following co-morbidities:       10/28/2020   I have the following health issues associated with obesity: High Blood Pressure, Stress Incontinence, Hypothyroidism   I have the following symptoms associated with obesity: Knee Pain, Depression, Lower Extremity Swelling, Back Pain, Fatigue, Hip Pain       Patient Goals 10/28/2020   I am interested in having a healthier weight to diminish current health problems: Yes   I am interested in having a healthier weight in order to prevent future health problems: Yes   I am interested in having a healthier weight in order to have a future surgery: Yes   If yes, please indicate which surgery? lapband       Referring Provider 10/28/2020   Please name the provider who referred you to Medical Weight Management.  If you do not know, please answer: \"I Don't Know\". Dione Lux       Weight History 10/28/2020   How concerned are you about your weight? Very Concerned   Would you describe your weight gain as gradual? Yes   I became overweight: As an Adult   The following factors have contributed to my weight gain:  After Quitting Smoking, Eating Wrong Types of Food, Lack of Exercise, Genetic (Runs in the Family), Stress   I have tried the following methods to lose weight: Watching Portions or Calories, Exercise, Atkins-type Diet (Low Carb/High Protein), Medications, Fasting   Please list the medications you have " tried.  phentermine   My lowest weight since age 20 was: 135   My highest weight since age 20 was: 191   The most weight I have ever lost was: (lbs) 45   I have the following family history of obesity/being overweight:  My mother is overweight, My father is overweight, Many of my relatives are overweight   Has anyone in your family had weight loss surgery? Yes   How has your weight changed over the last year?  Gained     Does not have breakfast so will eat after 12 noon up at 6-7am.  Will have coffee with cream and sugar. Eats on the go    Diet Recall Review with Patient 10/28/2020   Do you typically eat breakfast? No   If you do eat breakfast, what types of food do you eat? breakfast sandwhich   Do you typically eat lunch? No   If you do eat lunch, what types of food do you typically eat?  salad, pizza, burger.  Eats on the go.  Ordered a child size pizza yesterday from [employer].  Will drink water with lemon   Do you typically eat supper? Yes   If you do eat supper, what types of food do you typically eat? fried chicken, steak, salad. Typically eats at home.  Rest of the pizza at 8 pm   Do you typically eat snacks? Yes   If you do snack, what types of food do you typically eat? cashews, chips   Do you like vegetables?  Yes   Do you drink water? Yes   How many glasses of juice do you drink in a typical day? 2   How many of glasses of milk do you drink in a typical day? 0   If you do drink milk, what type? 2% - with cereal   How many 8oz glasses of sugar containing drinks such as Jaquan-Aid/sweet tea do you drink in a day? 2      Juice - MinuteMaid or  punch   How many cans/bottles of sugar pop/soda/tea/sports drinks do you drink in a day? 2   How many cans/bottles of diet pop/soda/tea or sports drink do you drink in a day? 2      Monster energy drink or RedBull   How often do you have a drink of alcohol? 2-3 TImes a Week   If you do drink, how many drinks might you have in a day? 1 or 2       Eating Habits  10/28/2020   Generally, my meals include foods like these: bread, pasta, rice, potatoes, corn, crackers, sweet dessert, pop, or juice. A Few Times a Week   Generally, my meals include foods like these: fried meats, brats, burgers, french fries, pizza, cheese, chips, or ice cream. A Few Times a Week   Eat fast food (like [employer], [employer], [employer]). A Few Times a Week   Eat at a buffet or sit-down restaurant. Less Than Weekly   Eat most of my meals in front of the TV or computer. Once a Week   Often skip meals, eat at random times, have no regular eating times. Almost Everyday   Rarely sit down for a meal but snack or graze throughout.  A Few Times a Week   Eat extra snacks between meals. Less Than Weekly   Eat most of my food at the end of the day. Once a Week   Eat in the middle of the night or wake up at night to eat. Once a Week   Eat extra snacks to prevent or correct low blood sugar. A Few Times a Week   Eat to prevent acid reflux or stomach pain. Less Than Weekly   Worry about not having enough food to eat. Never   Have you been to the food shelf at least a few times this year? Yes   I eat when I am depressed. Less Than Weekly   I eat when I am stressed. Less Than Weekly   I eat when I am bored. Once a Week   I eat when I am anxious. Once a Week   I eat when I am happy or as a reward. Less Than Weekly   I feel hungry all the time even if I just have eaten. Less Than Weekly   Feeling full is important to me. Less Than Weekly   I finish all the food on my plate even if I am already full. Less Than Weekly   I can't resist eating delicious food or walk past the good food/smell. A Few Times a Week   I eat/snack without noticing that I am eating. A Few Times a Week   I eat when I am preparing the meal. A Few Times a Week   I eat more than usual when I see others eating. A Few Times a Week   I have trouble not eating sweets, ice cream, cookies, or chips if they are around the house. Once a Week   I think about  food all day. Once a Week   What foods, if any, do you crave? Sweets/Candy/Chocolate       Amount of Food 10/28/2020   I make myself vomit what I have eaten or use laxatives to get rid of food. Never   I eat a large amount of food, like a loaf of bread, a box of cookies, a pint/quart of ice cream, all at once. Monthly   I eat a large amount of food even when I am not hungry. Weekly   I eat rapidly. Almost Everyday   I eat alone because I feel embarrassed and do not want others to see how much I have eaten. Almost Everyday   I eat until I am uncomfortably full. Weekly   I feel bad, disgusted, or guilty after I overeat. Almost Everyday   I make myself vomit what I have eaten or use laxatives to get rid of food. Never       Activity/Exercise History 10/28/2020   How much of a typical 12 hour day do you spend sitting? Less Than Half the Day   How much of a typical 12 hour day do you spend lying down? Less Than Half the Day   How much of a typical day do you spend walking/standing? Most of the Day   How many hours (not including work) do you spend on the TV/Video Games/Computer/Tablet/Phone? 4-5 Hours   How many times a week are you active for the purpose of exercise? Once a Week   What keeps you from being more active? Pain, Lack of Time, Too tired, Unsure What To Do   How many total minutes do you spend doing some activity for the purpose of exercising when you exercise? More Than 30 Minutes       PAST MEDICAL HISTORY:  Past Medical History:   Diagnosis Date     Abnormal Pap smear of cervix 10/18/2018    10/18/18: See problem list.      Cervical high risk HPV (human papillomavirus) test positive 10/18/2018, 6/12/19    See problem list     Hypertension      Migraine        Work/Social History Reviewed With Patient 10/28/2020   My employment status is: Full-Time   My job is:    How much of your job is spent on the computer or phone? 100%   How many hours do you spend commuting to work daily?  1 hour    What is your marital status? /In a Relationship   If in a relationship, is your significant other overweight? No   Do you have children? Yes   If you have children, are they overweight? Yes   Who do you live with?  My three sons   Are they supportive of your health goals? Yes   Who does the food shopping?  I do       Mental Health History Reviewed With Patient 10/28/2020   Have you ever been physically or sexually abused? No   If yes, do you feel that the abuse is affecting your weight? N/A   If yes, would you like to talk to a counselor about the abuse? N/A   How often in the past 2 weeks have you felt little interest or pleasure in doing things? Nearly Everyday   Over the past 2 weeks how often have you felt down, depressed, or hopeless? Nearly Everyday       Over the last two weeks, how often have you been bothered by any the following symptoms?  Please use the following scale;  0 = Not at all  1 = Several days but less than half  2 = More than half of the days  3 = Nearly every day    1) Little interest or pleasure in doing things [  3  ]  2) Feeling down, depressed, or hopeless.[  1  ]  3) Trouble falling or staying asleep, or sleeping too much [   3 ]  4) Feeling tired or having little energy.[  3  ]  5) Poor appetite or overeating [  2  ]  6) Feeling bad about yourself - or that you are a failure or have let yourself or your family down [  2  ]  7) Trouble concentrating on things, such as reading the newspaper or watching television [   2 ]  8) Moving or speaking so slowly that other people could have noticed. Or the opposite-being fidgety or restless that you have been moving around a lot more than usual [ 1   ]  9) Thoughts that you would be better off dead, or of hurting yourself in some way [  1  ]    Finally, how difficult have these problems made it for you to do your work, take care of things at home, or get along with other people?  Not difficult at all, somewhat difficult, very difficult or  extremely difficult?    Total of 18    Patient denies any suicidal ideation or attempts.  Has had thoughts of it being ok if she was not around      Sleep History Reviewed With Patient 10/28/2020   How many hours do you sleep at night? 5   Do you think that you snore loudly or has anybody ever heard you snore loudly (louder than talking or so loud it can be heard behind a shut door)? Yes   Has anyone seen or heard you stop breathing during your sleep? No   Do you often feel tired, fatigued, or sleepy during the day? Yes   Do you have a TV/Computer in your bedroom? Yes       MEDICATIONS:   Current Outpatient Medications   Medication Sig Dispense Refill     ammonium lactate (AMLACTIN) 12 % external cream Apply as moisturizer to hands three to four times a day 385 g 11     Emollient (AQUAPHOR ADVANCED THERAPY) OINT Externally apply topically 2 times daily as needed (itching) 85 g 3     fluocinonide (LIDEX) 0.05 % external ointment Apply a thin layer to rash on hands twice a day as directed. 30 g 1     hydrochlorothiazide (HYDRODIURIL) 25 MG tablet        ibuprofen (ADVIL/MOTRIN) 600 MG tablet Take 1 tablet (600 mg) by mouth every 8 hours as needed for moderate pain 90 tablet 1     order for DME Equipment being ordered: blood pressure machine 1 each 0     topiramate (TOPAMAX) 100 MG tablet Take 1 tablet (100 mg) by mouth daily Take 100 mg + 50 mg together at bedtime 90 tablet 3     topiramate (TOPAMAX) 50 MG tablet Take one 50 mg at bedtime with one 100 mg tablet 90 tablet 3     triamcinolone (ARISTOCORT HP) 0.5 % external cream Apply topically 2 times daily 45 g 1     vitamin D3 (CHOLECALCIFEROL) 2000 units (50 mcg) tablet TAKE 2 TABLETS BY MOUTH DAILY 180 tablet 2     ferrous gluconate (FERGON) 324 (38 Fe) MG tablet Take 1 tablet (324 mg) by mouth daily (with breakfast) (Patient not taking: Reported on 10/30/2020) 90 tablet 3     nicotine (NICODERM CQ) 21 MG/24HR 24 hr patch Place 1 patch onto the skin every 24 hours  "(Patient not taking: Reported on 10/30/2020) 28 patch 1     nicotine (NICODERM CQ) 7 MG/24HR 24 hr patch Place 1 patch onto the skin every 24 hours For 2 weeks (Patient not taking: Reported on 10/30/2020) 14 patch 0     nicotine (NICORETTE) 2 MG gum Place 1 each (2 mg) inside cheek as needed for smoking cessation (Patient not taking: Reported on 10/30/2020) 100 tablet 3     phentermine (ADIPEX-P) 37.5 MG tablet Take 1 tablet (37.5 mg) by mouth every morning (before breakfast) (Patient not taking: Reported on 10/30/2020) 30 tablet 3     ROS  General  Fatigue: daily  Sleep Quality: not good  Birth Control: No  HEENT  Hx of glaucoma:  No   Vision changes: No  Cardiovascular  Chest Pain with Exertion:  Couple times per month  Palpitations: yes  Hx of heart disease: not sure  Pulmonary  Shortness of breath at rest: sometimes  Shortness of breath with exertion: yes  Snoring: yes  Gastrointestinal  Heartburn: sometimes  Abdominal pain: No  History of pancreatitis: No  Severe constipation: Yes.  Takes laxative or miralax.  Has not taken anything for a while  Gastrourinary  History of kidney stones: No  Psychiatric  Moods Stable: No  History of drug abuse: No  Endocrine  Polydipsia: sometimes  Polyuria: Yes  Neurologic:  Hx of seizures: No  Hx of paresthesias  Yes in both hands    Personal or family history of thyroid cancer: No    ALLERGIES:   No Known Allergies       PHYSICAL EXAM:  Ht 5' 2.8\" (1.595 m)   Wt 189 lb (85.7 kg)   LMP 10/22/2020   BMI 33.69 kg/m    General: Appears in no acute distress  Alert and oriented  Pulmonary: No labored breathing      FOLLOW-UP:   2-4 weeks after she has been evaluated by primary and labs done        TIME: 60 min spent on evaluation, management, counseling, education, & motivational interviewing with greater than 50 % of the total time was spent on counseling and coordinating care    Sincerely,    Valerie Cortez PA-C      "

## 2020-10-30 NOTE — TELEPHONE ENCOUNTER
Patient called in today looking to schedule a visit with provider to discuss  GERD, fatigue and depression per visit with surgical weight loss clinic in [place] today.     PHQ9 and GAD7 sent via [employer].     Patient to be seen in Urgent Care if things become worse before 11/4/2020.     Note per office visit.   Spent a good portion of the visit discussing her current health concerns.  She has agreed to make an appointment today to see her primary regarding her chest pain, fatigue, and depression.  IF her primary deems appropriate and medications are prescribed for depression patient can mention possibly trying wellbutrin.      Patient stated that chest pain maybe due to acid reflux.       Karla Wilkinson RN Flex

## 2020-10-30 NOTE — PATIENT INSTRUCTIONS

## 2020-10-30 NOTE — TELEPHONE ENCOUNTER
ONCOLOGY INTAKE: Records Information      APPT INFORMATION:  Referring provider:  PEYTON Fernandez MD  Referring provider s clinic:  [facility]  Reason for visit/diagnosis: Adiposity, localized.   Has patient been notified of appointment date and time?: Yes    RECORDS INFORMATION:  Were the records received with the referral (via [employer])? No    Has patient been seen for any external appt for this diagnosis? No    If yes, where? N/a    Has patient had any imaging or procedures outside of [place] for this condition? No      If Yes, where? N/a    ADDITIONAL INFORMATION:  None

## 2020-10-30 NOTE — TELEPHONE ENCOUNTER
Additional Information    Intermittent mild chest pain lasting a few seconds each time    Protocols used: CHEST PAIN-A-OH

## 2020-11-02 NOTE — TELEPHONE ENCOUNTER
RECORDS STATUS - ALL OTHER DIAGNOSIS      RECORDS RECEIVED FROM: [facility] - Internal Referral   DATE RECEIVED: 11/2/20   NOTES STATUS DETAILS   OFFICE NOTE from referring provider     OFFICE NOTE from medical oncologist     DISCHARGE SUMMARY from hospital     DISCHARGE REPORT from the ER     OPERATIVE REPORT     MEDICATION LIST     CLINICAL TRIAL TREATMENTS TO DATE     LABS     PATHOLOGY REPORTS     ANYTHING RELATED TO DIAGNOSIS     GENONOMIC TESTING     TYPE:     IMAGING (NEED IMAGES & REPORT)     CT SCANS     MRI     MAMMO     ULTRASOUND     PET

## 2020-11-05 ENCOUNTER — OFFICE VISIT (OUTPATIENT)
Dept: INTERNAL MEDICINE | Facility: CLINIC | Age: 40
End: 2020-11-05
Payer: COMMERCIAL

## 2020-11-05 VITALS
RESPIRATION RATE: 18 BRPM | TEMPERATURE: 98.1 F | HEART RATE: 88 BPM | WEIGHT: 189.7 LBS | BODY MASS INDEX: 33.82 KG/M2 | OXYGEN SATURATION: 99 % | DIASTOLIC BLOOD PRESSURE: 104 MMHG | SYSTOLIC BLOOD PRESSURE: 148 MMHG

## 2020-11-05 DIAGNOSIS — R53.83 FATIGUE, UNSPECIFIED TYPE: ICD-10-CM

## 2020-11-05 DIAGNOSIS — D06.9 CARCINOMA IN SITU OF CERVIX, UNSPECIFIED LOCATION: ICD-10-CM

## 2020-11-05 DIAGNOSIS — R63.5 WEIGHT GAIN: ICD-10-CM

## 2020-11-05 DIAGNOSIS — E66.09 CLASS 1 OBESITY DUE TO EXCESS CALORIES WITH SERIOUS COMORBIDITY AND BODY MASS INDEX (BMI) OF 33.0 TO 33.9 IN ADULT: ICD-10-CM

## 2020-11-05 DIAGNOSIS — E66.811 CLASS 1 OBESITY DUE TO EXCESS CALORIES WITH SERIOUS COMORBIDITY AND BODY MASS INDEX (BMI) OF 33.0 TO 33.9 IN ADULT: ICD-10-CM

## 2020-11-05 DIAGNOSIS — F32.0 MAJOR DEPRESSIVE DISORDER, SINGLE EPISODE, MILD (H): Primary | ICD-10-CM

## 2020-11-05 DIAGNOSIS — E61.1 IRON DEFICIENCY: ICD-10-CM

## 2020-11-05 DIAGNOSIS — I10 ESSENTIAL HYPERTENSION: ICD-10-CM

## 2020-11-05 DIAGNOSIS — R13.10 DYSPHAGIA, UNSPECIFIED TYPE: ICD-10-CM

## 2020-11-05 LAB
ALBUMIN SERPL-MCNC: 3.6 G/DL (ref 3.4–5)
ALP SERPL-CCNC: 75 U/L (ref 40–150)
ALT SERPL W P-5'-P-CCNC: 22 U/L (ref 0–50)
ANION GAP SERPL CALCULATED.3IONS-SCNC: 6 MMOL/L (ref 3–14)
AST SERPL W P-5'-P-CCNC: 17 U/L (ref 0–45)
BILIRUB SERPL-MCNC: 0.2 MG/DL (ref 0.2–1.3)
BUN SERPL-MCNC: 8 MG/DL (ref 7–30)
CALCIUM SERPL-MCNC: 9 MG/DL (ref 8.5–10.1)
CHLORIDE SERPL-SCNC: 106 MMOL/L (ref 94–109)
CO2 SERPL-SCNC: 24 MMOL/L (ref 20–32)
CREAT SERPL-MCNC: 0.66 MG/DL (ref 0.52–1.04)
DEPRECATED CALCIDIOL+CALCIFEROL SERPL-MC: 22 UG/L (ref 20–75)
ERYTHROCYTE [DISTWIDTH] IN BLOOD BY AUTOMATED COUNT: 14.7 % (ref 10–15)
FERRITIN SERPL-MCNC: 21 NG/ML (ref 12–150)
GFR SERPL CREATININE-BSD FRML MDRD: >90 ML/MIN/{1.73_M2}
GLUCOSE SERPL-MCNC: 90 MG/DL (ref 70–99)
HBA1C MFR BLD: 5.6 % (ref 0–5.6)
HCT VFR BLD AUTO: 42 % (ref 35–47)
HGB BLD-MCNC: 13.8 G/DL (ref 11.7–15.7)
IRON SATN MFR SERPL: 13 % (ref 15–46)
IRON SERPL-MCNC: 47 UG/DL (ref 35–180)
MCH RBC QN AUTO: 27.2 PG (ref 26.5–33)
MCHC RBC AUTO-ENTMCNC: 32.9 G/DL (ref 31.5–36.5)
MCV RBC AUTO: 83 FL (ref 78–100)
PLATELET # BLD AUTO: 350 10E9/L (ref 150–450)
POTASSIUM SERPL-SCNC: 4.1 MMOL/L (ref 3.4–5.3)
PROT SERPL-MCNC: 7.4 G/DL (ref 6.8–8.8)
RBC # BLD AUTO: 5.08 10E12/L (ref 3.8–5.2)
SODIUM SERPL-SCNC: 136 MMOL/L (ref 133–144)
TIBC SERPL-MCNC: 366 UG/DL (ref 240–430)
TSH SERPL DL<=0.005 MIU/L-ACNC: 1.81 MU/L (ref 0.4–4)
WBC # BLD AUTO: 10 10E9/L (ref 4–11)

## 2020-11-05 PROCEDURE — 82306 VITAMIN D 25 HYDROXY: CPT | Performed by: INTERNAL MEDICINE

## 2020-11-05 PROCEDURE — 99214 OFFICE O/P EST MOD 30 MIN: CPT | Performed by: INTERNAL MEDICINE

## 2020-11-05 PROCEDURE — 83036 HEMOGLOBIN GLYCOSYLATED A1C: CPT | Performed by: INTERNAL MEDICINE

## 2020-11-05 PROCEDURE — 80053 COMPREHEN METABOLIC PANEL: CPT | Performed by: INTERNAL MEDICINE

## 2020-11-05 PROCEDURE — 83550 IRON BINDING TEST: CPT | Performed by: INTERNAL MEDICINE

## 2020-11-05 PROCEDURE — 83540 ASSAY OF IRON: CPT | Performed by: INTERNAL MEDICINE

## 2020-11-05 PROCEDURE — 36415 COLL VENOUS BLD VENIPUNCTURE: CPT | Performed by: INTERNAL MEDICINE

## 2020-11-05 PROCEDURE — 82728 ASSAY OF FERRITIN: CPT | Performed by: INTERNAL MEDICINE

## 2020-11-05 PROCEDURE — 85027 COMPLETE CBC AUTOMATED: CPT | Performed by: INTERNAL MEDICINE

## 2020-11-05 PROCEDURE — 84443 ASSAY THYROID STIM HORMONE: CPT | Performed by: INTERNAL MEDICINE

## 2020-11-05 RX ORDER — BUPROPION HYDROCHLORIDE 150 MG/1
TABLET ORAL
Qty: 110 TABLET | Refills: 0 | Status: SHIPPED | OUTPATIENT
Start: 2020-11-05 | End: 2021-03-17

## 2020-11-05 NOTE — PROGRESS NOTES
Subjective     Elizabeth Lopez is a 40 year old female who presents to clinic today for the following health issues:    HPI   GERD/Heartburn  Onset/Duration: 2 months  Description: chest pain, hard to swallow   Intensity: moderate  Progression of Symptoms: intermittent  Accompanying Signs & Symptoms:  Does it feel like food gets stuck or trouble swallowing: YES  Nausea: no  Vomiting (bloody?): no  Abdominal Pain: no  Black-Tarry stools: no  Bloody stools: no  History:  Previous similar episodes: no  Previous ulcers: no  Precipitating factors:   Caffeine use: YES  Alcohol use: YES  NSAID/Aspirin use: yes  Tobacco use: no  Worse with no particular food or drink.  Alleviating factors: None  Therapies tried and outcome:             Lifestyle changes: None            Medications: ibuprofen and tums     Hypertension Follow-up    Do you check your blood pressure regularly outside of the clinic? No     Are you following a low salt diet? No    Are your blood pressures ever more than 140 on the top number (systolic) OR more   than 90 on the bottom number (diastolic), for example 140/90? N/A    Elizabeth presents today to discuss a number of issues, as follows. She was 20 minutes late to the appointment.    Weight gain - Seen by weight loss a clinic. She would like to be placed on metformin, Wellbutrin, or a GLP-1 for weight loss as they were mentioned in the weight loss nutritionist's note.    Depression - Stressed lately. Mood has been down. Sleep has been impacted. Feels tired and not motivated. Concentration down. Never been depressed before. Took an antidepressant once (literally one time) and didn't feel any different so didn't take again. Interested in Wellbutrin as above.    Feeling of something getting stuck in her throat when she's eating - Any food. No liquids. Not getting worse. Happened last week but before that it had been about a month. Weight gain not weight loss. She'd like this worked up as it is quite  "painful.    HTN - Started hydrochlorothiazide three days ago. BP down from prior though not quite at goal today. She'd like to give it more time \"to kick in\" before adjusting the dose.    Shoulder pain - Bilateral. Muscular in nature. Her fiancee massaging her shoulders helps.    Urinary incontinence - Longstanding issue. No dysuria.    Review of Systems   Constitutional, HEENT, cardiovascular, pulmonary, gi and gu systems are negative, except as otherwise noted.      Objective    BP (!) 148/104   Pulse 88   Temp 98.1  F (36.7  C) (Temporal)   Resp 18   Wt 86 kg (189 lb 11.2 oz)   LMP 10/22/2020 (Exact Date)   SpO2 99%   BMI 33.82 kg/m    Body mass index is 33.82 kg/m .  Physical Exam   GENERAL: alert and in no distress.  EYES: conjunctivae/corneas clear. EOMs grossly intact  HENT: NC/AT, facies symmetric. Neck supple. No cervical LAD appreciated.  RESP: CTAB, no w/r/r.  CV: RRR, no m/r/g.  GI: NT, ND, no organomegaly, without rebound tenderness or guarding  MSK: No edema. No cyanosis or clubbing noted bilaterally in upper and/or lower extremities. 5/5 strength in all fields of motion in BUE. No trapezius TTP. No shoulder joint pain.  SKIN: No significant ulcers, lesions, or rashes on the visualized portions of the skin  NEURO: Alert. Oriented. Sensation grossly WNL.    No results found for this or any previous visit (from the past 24 hour(s)).        Assessment & Plan     Major depressive disorder, single episode, mild (H)  Meeting criteria for mild MDD episode. Discussed how  untreated depression can cause fatigue, weight gain, sleep disturbance, concentration loss, and other symptoms.  She is quite interested in starting Wellbutrin as the weight loss clinic recently mentioned it.  I had a long discussion with her today that antidepressants only work if you commit to taking it every day for at least a month and that they do not work if you take it one time.  We reviewed the side effects of Wellbutrin, " including possibility of seizure and bit more anxiety.  She denies any history of seizures.  She would like to start it.  - buPROPion (WELLBUTRIN XL) 150 MG 24 hr tablet; Take 1 tablet (150 mg) by mouth every morning for 10 days, THEN 2 tablets (300 mg) every morning.    Fatigue, unspecified type  Nonspecific and per chart review looks like it is an ongoing issue.  She does report heavy menses.  She has been iron deficient in the past.  Differential is wide including iron deficiency versus hypothyroidism versus underlying liver or kidney disease versus other.  Depression certainly is likely also playing a role here.  - Ferritin  - Iron and iron binding capacity  - CBC with platelets  - Comprehensive metabolic panel  - TSH with free T4 reflex    Weight gain  Saw a weight loss clinic recently.  Please refer to their note.  She was again quite concerned about this today.  - Comprehensive metabolic panel  - TSH with free T4 reflex    Essential hypertension  Recently started on hydrochlorothiazide and she would like to give it a bit more time to work before uptitrating.  Blood pressure above goal today but down from previous.  Strongly encouraged her to continue following up with this blood pressure so we can get it under control.    Dysphagia, unspecified type  Discussed differences in dysphagia (oropharyngeal versus esophageal).  No red flag symptoms.  It does seem to get caught in the center of her chest when she feels food is caught, favoring esophageal.  It does seem distressing to her and she would like it worked up.  Differential includes eosinophilic esophagitis versus esophageal dysmotility versus other.  Referred for EGD.  - GASTROENTEROLOGY ADULT REF PROCEDURE ONLY; Future    Carcinoma in situ of cervix, unspecified location  I had a long discussion with her today that her past cervical biopsy results are bit concerning and that I strongly recommend she reestablishes with OB/GYN to pursue this LEEP that has  been mentioned repeatedly in the notes as the last thing anyone wants is to miss a PASTORA lesion and have it progress to cervical cancer.  - Comprehensive metabolic panel  - OB/GYN REFERRAL    Return in about 4 weeks (around 12/3/2020) for Mood Check, Video Visit.    Basil Dobbins MD  [facility]

## 2020-11-05 NOTE — PATIENT INSTRUCTIONS
- Please schedule an appointment with OB/GYN  - GI referral placed as well  - I will send you a message on [employer] when I am able to look at the results of your tests from today    Patient Education     Using Antidepressants  Depression is a mood disorder that affects the way you think and feel. The most common symptom is a feeling of deep sadness. This feeling doesn't go away or get better on its own. But most types of depression can be helped with therapy and antidepressant medicines. (Note: This covers antidepressant use in adults only.)   What do antidepressants do?  Antidepressants restore the balance of certain chemicals in your brain to help ease your depression. You will likely feel better in 4 to 6 weeks. But you may continue taking antidepressants for a year or more to keep your symptoms from coming back. Some people with depression need to take antidepressants for life. There are several types of antidepressants. The main types are described below.   Selective serotonin reuptake inhibitors (SSRIs)  SSRIs are effective medicines for the treatment of depression. They tend to have fewer side effects than other antidepressants. Possible side effects include anxiety, trouble sleeping, nausea, diarrhea, sexual dysfunction, and headaches. In rare cases, they may make you more depressed. SSRIs shouldn t be mixed with certain other medicines. Talk with your healthcare provider about all the medicines, herbs, and supplements you are taking.   Tricyclic antidepressants  Tricyclics help severe or long-term depression. They have been used for many years with good results. Possible side effects include blurred vision, dry mouth, and constipation.   Monoamine oxidase inhibitors (MAOIs)  If you don t respond to tricyclics or SSRIs, your healthcare provider may prescribe MAOIs. These medicines can be very effective. But people taking MAOIs must stay away from certain foods and medicines. Your healthcare provider can tell  you more.   Lithium  If you have bipolar disorder, you may take a medicine called lithium. This medicine helps even out your mood. Possible side effects are weight gain, trembling, loose stool, and nausea. Lithium is also used:     For people who have unipolar depression and have not responded to other antidepressants    For people who have a sudden (acute) episode of unipolar depression    As a maintenance medicine to prevent unipolar depression from happening again  Things to stay away from if you are taking MAOIs   If you are taking MAOIs, don t have any of the following:     Beans    Aged cheese    Chocolate    Red wine    Most cold medicines    Certain medicines (ask your healthcare provider)  To reduce the risk of lithium poisoning  You can reduce the risk of lithium poisoning by following this advice:    Take only the prescribed amount of lithium. If your depressive symptoms get worse, contact your healthcare provider. Never increase or decrease your medicine on your own.    Drink plenty of fluids other than coffee, tea, and soda.    Limit salt in your diet.    Before using other prescribed medicines or over-the-counter medicines, check with your pharmacist. This is to be sure the medicines won t interact with the lithium.    Never share your medicines or use another person's medicines, even if it is the same medicine and dose.    Keep follow-up appointments.    Have your lithium blood level checked as advised. You will need blood work more often when symptoms are not under control.  If you have side effects  The side effects of antidepressants are usually mild. But if you have troubling side effects, call your healthcare provider. Changing the dosage or type of medicine may help. Never stop taking medicines on your own.   [employer] last reviewed this educational content on 9/1/2019 2000-2020 [employer]. [address] [place] [address], [place] [address]. All rights reserved. This information is not  intended as a substitute for professional medical care. Always follow your healthcare professional's instructions.

## 2020-11-06 RX ORDER — FERROUS SULFATE 325(65) MG
325 TABLET ORAL EVERY OTHER DAY
Qty: 45 TABLET | Refills: 1 | Status: SHIPPED | OUTPATIENT
Start: 2020-11-06 | End: 2021-03-17

## 2020-11-06 ASSESSMENT — PATIENT HEALTH QUESTIONNAIRE - PHQ9: SUM OF ALL RESPONSES TO PHQ QUESTIONS 1-9: 14

## 2020-12-10 ENCOUNTER — TELEPHONE (OUTPATIENT)
Dept: SURGERY | Facility: CLINIC | Age: 40
End: 2020-12-10

## 2020-12-10 NOTE — TELEPHONE ENCOUNTER
Called pt back at requested number to get more information.  No answer; unable to leave VM.  Evon Peck RN on 12/10/2020 at 10:03 AM

## 2020-12-10 NOTE — TELEPHONE ENCOUNTER
PA states she needs to see pt to discuss meds. Set up VV for tomorrow.  Evon Peck RN on 12/10/2020 at 11:24 AM

## 2020-12-10 NOTE — TELEPHONE ENCOUNTER
Pt needs a call from Valerie to discuss changing her meds.   Please use this temp number:    [phone]  **OK to leave detailed VM

## 2020-12-11 ENCOUNTER — PRE VISIT (OUTPATIENT)
Dept: ONCOLOGY | Facility: CLINIC | Age: 40
End: 2020-12-11

## 2020-12-11 ENCOUNTER — VIRTUAL VISIT (OUTPATIENT)
Dept: SURGERY | Facility: CLINIC | Age: 40
End: 2020-12-11
Payer: COMMERCIAL

## 2020-12-11 VITALS — HEIGHT: 63 IN | WEIGHT: 191 LBS | BODY MASS INDEX: 33.84 KG/M2

## 2020-12-11 DIAGNOSIS — R03.0 ELEVATED BP WITHOUT DIAGNOSIS OF HYPERTENSION: ICD-10-CM

## 2020-12-11 DIAGNOSIS — E66.811 CLASS 1 OBESITY DUE TO EXCESS CALORIES WITH BODY MASS INDEX (BMI) OF 34.0 TO 34.9 IN ADULT, UNSPECIFIED WHETHER SERIOUS COMORBIDITY PRESENT: Primary | ICD-10-CM

## 2020-12-11 DIAGNOSIS — E66.09 CLASS 1 OBESITY DUE TO EXCESS CALORIES WITH BODY MASS INDEX (BMI) OF 34.0 TO 34.9 IN ADULT, UNSPECIFIED WHETHER SERIOUS COMORBIDITY PRESENT: Primary | ICD-10-CM

## 2020-12-11 DIAGNOSIS — N39.3 FEMALE STRESS INCONTINENCE: ICD-10-CM

## 2020-12-11 PROCEDURE — 99213 OFFICE O/P EST LOW 20 MIN: CPT | Mod: 95 | Performed by: PHYSICIAN ASSISTANT

## 2020-12-11 ASSESSMENT — MIFFLIN-ST. JEOR: SCORE: 1512.32

## 2020-12-11 NOTE — PATIENT INSTRUCTIONS
"      MEDICATION STARTED AT THIS APPOINTMENT    We are starting Naltrexone. Start with 1/2 tab 1-2 hours prior to the time you have the most trouble with cravings or extra hunger. If you are doing well you may switch to a whole tablet taken at the same time period.     WARNING: This medication blocks the action of opioid type pain medications. If you routinely take any medication like Codeine, Oxycontin,Percocet,Morphine,Dilaudid or Methodone, do not take this until you have talked with weight management staff. If you are planning surgery you should stop Naltrexone 4 days prior to the surgery. If you have an injury that requires pain medication, make sure the health care staff knows you take Naltrexone.     Call the nurse at [phone] if you have any questions or concerns. (Do not stop taking it if you don't think it's working. For some people it works without them knowing it.)    Naltrexone is a medication that is used most often to help people who are troubled by dependence on prescription pain killers or alcohol. It has also been found to help with weight loss. Although it's not currently FDA approved for weight loss, it has been used safely for a number of years to help people who are carrying extra weight.     Just how Naltrexone helps with weight loss has not been exactly determined.  It seems to work by quieting down brain signals related to strong food cravings. Many of our patients use the word \"addiction\" to describe their feelings and constant thoughts about food. It makes sense then to treat the feeling of dependence on food, outside of real hunger, with a medication designed to help with other sorts of dependence.     Our patients on Naltrexone find that they:    >feel less interest in food   >think less about food and eating and have more time to think of other things   >find it easier to push the plate away   >have an easier time eating less    For some of our patients, these feelings are very " immediate. Other patients, don't feel much of a change but find they've lost weight. Like all weight loss medications, Naltrexone works best when you help it work. This means:  1. Having less tempting high calorie (fattening) food around the house or office. (For people with strong cravings this is very important.)   2. Staying away from situations or people that may trigger your cravings .   3. Eating out only one time or less each week.  4. Eating your meals at a table with the TV or computer off.    Side-effects. Naltrexone is generally well tolerated. The main side-effect we see is  nausea or a woozy feeling. A small number of people feel quite ill. Most people have a mild reaction and some people have no reaction at all.  The good news is that this feeling does go away.     In order to avoid nausea, please start the medication with half a pill for the first few days. Go on to a full pill if you are feeling well.      If you  are nauseated on 1/2 a pill it is okay to cut back to 1/4 pill ( a very small amount). Take this for a couple of days and work your way back up to a 1/2 pill and then a whole pill. Taking the medication at night or with food  to start also may help prevent the feeling of nausea.         Please refer to the pharmacy insert for more information on side-effects. Since many pharmacists are not familiar with the use of naltrexone in weight loss, calling the nurse at [phone] will get you the most accurate information.  In order to get refills of this or any medication we prescribe you must be seen in the medical weight mgmt clinic every 2-3 months.

## 2020-12-11 NOTE — PROGRESS NOTES
"Elizabeth Lopez is a 40 year old female who is being evaluated via a billable video visit.      The patient has been notified of following:     \"This video visit will be conducted via a call between you and your physician/provider. We have found that certain health care needs can be provided without the need for an in-person physical exam.  This service lets us provide the care you need with a video conversation.  If a prescription is necessary we can send it directly to your pharmacy.  If lab work is needed we can place an order for that and you can then stop by our lab to have the test done at a later time.    Video visits are billed at different rates depending on your insurance coverage.  Please reach out to your insurance provider with any questions.    If during the course of the call the physician/provider feels a video visit is not appropriate, you will not be charged for this service.\"    Patient has given verbal consent for Video visit? Yes  How would you like to obtain your AVS? MyChart  If you are dropped from the video visit, the video invite should be resent to: [phone]     Will anyone else be joining your video visit? No        Video-Visit Details    Type of service:  Video Visit    Video Start Time: 10:13  Video End Time: 10:39    Originating Location (pt. Location): Home    Distant Location (provider location):      [facility]    Platform used for Video Visit: "[employer]      2020        Return Virtual Medical Weight Management Note       Elizabeth Lopez  MRN:  [id number]  :  1980      Dear Clinic, [place] [facility],    I had the pleasure of doing a video visit with your patient Elizabeth Lopez.  She is a 40 year old female who I am continuing to see for treatment of obesity related to:       10/28/2020   I have the following health issues associated with obesity: High Blood Pressure, Stress Incontinence, " Hypothyroidism   I have the following symptoms associated with obesity: Knee Pain, Depression, Lower Extremity Swelling, Back Pain, Fatigue, Hip Pain       INTERVAL HISTORY:  Patient followed up with primary and was started on bupropion 150 mg bid last month.  Does not feel like it has impacted her appetite.  Feels somewhat better mentally.  Also has gotten some herbal things to help. Feels like she is not that depressed but more situational. Is interested in another medication that can help with weight loss.    Continues to take 150 mg topamax daily for migraines  Has not met with the dietitian      CURRENT WEIGHT (PATIENT REPORTED):  191 lbs 0 oz    Wt Readings from Last 4 Encounters:   12/11/20 191 lb (86.6 kg)   11/05/20 189 lb 11.2 oz (86 kg)   10/30/20 189 lb (85.7 kg)   10/27/20 189 lb 1.6 oz (85.8 kg)       DIETARY HISTORY  Meals Per Day: 2  Food Diary:   B: blueberry bagel with cream cheese with coffee with cream and sugar  L: Did not eat lunch yesterday  D:shrimp, lobster and crab legs with potatoes and corn  Snacks Per Day: None  Typical Snack: NA  Fluid Intake: over 70 oz water, 2-3 cups coffee and tea  Calorie Containing Beverages: 1 wine twice weekly  Typical Protein Food Choices: meats  Meals at Restaurant per week: None    Getting Adequate Protein: yes  Sleeping 7-8 hours/day: Yes      Exercise: Stretches in the morning. Follows a YouTube visit for some exercise twice weekly      ROS:  General  Fatigue: better  Sleep Quality: birth  Birth Control: No. LMP 11/21/2020  HEENT  Hx of glaucoma:  No   Vision changes: No  Cardiovascular  Chest Pain with Exertion:  Couple times per month - improved discussed with primary  Palpitations: no  Hx of heart disease: not sure  Pulmonary  Shortness of breath at rest: No  Shortness of breath with exertion: yes  Snoring: yes  Gastrointestinal  Heartburn: sometimes  Abdominal pain: No  History of pancreatitis: No  Severe constipation: Yes.  Takes laxative or miralax.   Has not taken anything for a while  Gastrourinary  History of kidney stones: No  Psychiatric  Moods Stable: yes  History of drug abuse: No  Endocrine  Polydipsia: sometimes  Polyuria: Yes  Neurologic:  Hx of seizures: No  Hx of paresthesias  Yes in both hands  Personal or family history of thyroid cancer: No       MEDICATIONS:   Current Outpatient Medications   Medication     ammonium lactate (AMLACTIN) 12 % external cream     buPROPion (WELLBUTRIN XL) 150 MG 24 hr tablet     Emollient (AQUAPHOR ADVANCED THERAPY) OINT     ferrous sulfate (FEROSUL) 325 (65 Fe) MG tablet     fluocinonide (LIDEX) 0.05 % external ointment     hydrochlorothiazide (HYDRODIURIL) 25 MG tablet     ibuprofen (ADVIL/MOTRIN) 600 MG tablet     nicotine (NICODERM CQ) 21 MG/24HR 24 hr patch     nicotine (NICODERM CQ) 7 MG/24HR 24 hr patch     nicotine (NICORETTE) 2 MG gum     order for DME     phentermine (ADIPEX-P) 37.5 MG tablet     topiramate (TOPAMAX) 100 MG tablet     topiramate (TOPAMAX) 50 MG tablet     triamcinolone (ARISTOCORT HP) 0.5 % external cream     vitamin D3 (CHOLECALCIFEROL) 2000 units (50 mcg) tablet     No current facility-administered medications for this visit.        LABS:    Hemoglobin A1C   Date Value Ref Range Status   11/05/2020 5.6 0 - 5.6 % Final     Comment:     Normal <5.7% Prediabetes 5.7-6.4%  Diabetes 6.5% or higher - adopted from ADA   consensus guidelines.       Vitamin D Deficiency screening   Date Value Ref Range Status   11/05/2020 22 20 - 75 ug/L Final     Comment:     Season, race, dietary intake, and treatment affect the concentration of   25-hydroxy-Vitamin D. Values may decrease during winter months and increase   during summer months. Values 20-29 ug/L may indicate Vitamin D insufficiency   and values <20 ug/L may indicate Vitamin D deficiency.  Vitamin D determination is routinely performed by an immunoassay specific for   25 hydroxyvitamin D3.  If an individual is on vitamin D2 (ergocalciferol)  "  supplementation, please specify 25 OH vitamin D2 and D3 level determination by   LCMSMS test [id number].       Sodium   Date Value Ref Range Status   11/05/2020 136 133 - 144 mmol/L Final     Potassium   Date Value Ref Range Status   11/05/2020 4.1 3.4 - 5.3 mmol/L Final     Chloride   Date Value Ref Range Status   11/05/2020 106 94 - 109 mmol/L Final     Carbon Dioxide   Date Value Ref Range Status   11/05/2020 24 20 - 32 mmol/L Final     Anion Gap   Date Value Ref Range Status   11/05/2020 6 3 - 14 mmol/L Final     Glucose   Date Value Ref Range Status   11/05/2020 90 70 - 99 mg/dL Final     Urea Nitrogen   Date Value Ref Range Status   11/05/2020 8 7 - 30 mg/dL Final     Creatinine   Date Value Ref Range Status   11/05/2020 0.66 0.52 - 1.04 mg/dL Final     GFR Estimate   Date Value Ref Range Status   11/05/2020 >90 >60 mL/min/[1.73_m2] Final     Comment:     Non  GFR Calc  Starting 12/18/2018, serum creatinine based estimated GFR (eGFR) will be   calculated using the Chronic Kidney Disease Epidemiology Collaboration   (CKD-EPI) equation.       Calcium   Date Value Ref Range Status   11/05/2020 9.0 8.5 - 10.1 mg/dL Final     Bilirubin Total   Date Value Ref Range Status   11/05/2020 0.2 0.2 - 1.3 mg/dL Final     Alkaline Phosphatase   Date Value Ref Range Status   11/05/2020 75 40 - 150 U/L Final     ALT   Date Value Ref Range Status   11/05/2020 22 0 - 50 U/L Final     AST   Date Value Ref Range Status   11/05/2020 17 0 - 45 U/L Final        PE:  Ht 5' 2.8\" (1.595 m)   Wt 191 lb (86.6 kg)   BMI 34.05 kg/m    GENERAL Pt sounds alert, in NAD  Psych: Affect sounds normal  Respiratory: Patient is speaking in full sentences, no coughing or audible wheezing      ASSESSMENT/PLAN:   Obesity  Stress incontinence  Elevated blood pressure    We discussed the role of pharmacological agents in the treatment of obesity and the \"off-label\" use of medications in this practice. We discussed the risks and benefits " of each. We discussed indications, contraindications, potential side effects, and estimated costs of each. Discussed that medications must always be used together with lifestyle changes such as improvements in diet choices, portion control and establishing and maintaining a regular exercise program.       Took phentermine in the past.  Worked at first but then stopped.  Her last blood pressure reading in Epic was 148/104.  Has been on topamax for years  Will not qualify for a GLP-1    Reiterated the importance of lifestyle and diet for long tem success.   Discussed adding naltrexone to her wellbutrin to mimic contrave.  Patient denies any current opioid use. She is scheduled to see her specialist today and will get her blood pressure rechecked as it was very high at her last check in [facility]. Will send over RX for wellbutin 100 mg bid and naltrexone 25 mg   Recent labs reviewed and drug interaction check run      UPDATE: Patient had blood pressure checked 12/15/2020 reports it as 142/89.  She states she is not pregnant and advised to stop medications if she becomes pregnant. No anxiety issues.     All of patients questions about the weight loss program were answered fully.        Valerie Cortez PA-C

## 2020-12-15 ENCOUNTER — TELEPHONE (OUTPATIENT)
Dept: SURGERY | Facility: CLINIC | Age: 40
End: 2020-12-15

## 2020-12-15 RX ORDER — NALTREXONE HYDROCHLORIDE 50 MG/1
TABLET, FILM COATED ORAL
Qty: 30 TABLET | Refills: 0 | Status: SHIPPED | OUTPATIENT
Start: 2020-12-15 | End: 2021-11-12

## 2020-12-15 RX ORDER — BUPROPION HYDROCHLORIDE 100 MG/1
100 TABLET ORAL 2 TIMES DAILY
Qty: 120 TABLET | Refills: 0 | Status: SHIPPED | OUTPATIENT
Start: 2020-12-15 | End: 2021-03-17

## 2020-12-15 NOTE — TELEPHONE ENCOUNTER
Pt left  asking for Valerie to call her back at [phone] (work number) as her phone is broken.    Evon Peck RN on 12/15/2020 at 12:48 PM

## 2020-12-15 NOTE — TELEPHONE ENCOUNTER
ONCOLOGY INTAKE: Records Information      APPT INFORMATION:  Referring provider:  Dr. Livan Fernandez MD  Referring provider s clinic:  [facility]   Reason for visit/diagnosis:  Adiposity, localized   Has patient been notified of appointment date and time?: Yes    RECORDS INFORMATION:  Were the records received with the referral (via [employer])? No,Internal Referral      Has patient been seen for any external appt for this diagnosis? No    If yes, where? NA      ADDITIONAL INFORMATION:  None

## 2020-12-16 ENCOUNTER — PRE VISIT (OUTPATIENT)
Dept: SURGERY | Facility: CLINIC | Age: 40
End: 2020-12-16

## 2020-12-17 ENCOUNTER — PRE VISIT (OUTPATIENT)
Dept: ONCOLOGY | Facility: CLINIC | Age: 40
End: 2020-12-17

## 2020-12-22 ENCOUNTER — TELEPHONE (OUTPATIENT)
Dept: SURGERY | Facility: CLINIC | Age: 40
End: 2020-12-22

## 2020-12-22 DIAGNOSIS — E88.819 INSULIN RESISTANCE: ICD-10-CM

## 2020-12-22 DIAGNOSIS — E66.811 CLASS 1 OBESITY DUE TO EXCESS CALORIES WITHOUT SERIOUS COMORBIDITY WITH BODY MASS INDEX (BMI) OF 34.0 TO 34.9 IN ADULT: Primary | ICD-10-CM

## 2020-12-22 DIAGNOSIS — E66.09 CLASS 1 OBESITY DUE TO EXCESS CALORIES WITHOUT SERIOUS COMORBIDITY WITH BODY MASS INDEX (BMI) OF 34.0 TO 34.9 IN ADULT: Primary | ICD-10-CM

## 2020-12-22 NOTE — TELEPHONE ENCOUNTER
Patient states she is returning a call from Valerie de leon and is available until [id number] to day to discuss.  Would like a call back then.  Her number is [phone].  Will pass message along to provider.  Kimberly Maldonado, MS, RD, RN            Called patient back.  She said took wellbutrin and naltrexone and she feels like her blood pressure was really high.  After reviewing her chart it was very high prior to starting this medication and had actually had it checked at a specialist before RX sent over.  She stopped wellbutrin and naltrexone and will not restart.      She can't take phentermine - blood pressure too high  Topamax - Currently on for migraines  Stopped wellbutrin and naltrexone - had nausea and felt like wellbutrin made blood pressure high.  Will not prescribe again until she follow up with primary   Not a candidate for GLP-1.    Metformin is left to try. Discussed side effects and possible mechanisms of action.  Reviewed patients current medications and ran drug interaction check. Reviewed GFR > 90 on 11/05/2020.   Patient information placed in [employer]. Reviewed that the best effect of this medication is when it goes along with healthy eating choices.        She is strongly advised to follow up with primary ASAP regarding blood pressure.     Valerie Cortez MS, PAJUSTIN

## 2021-01-03 ENCOUNTER — HEALTH MAINTENANCE LETTER (OUTPATIENT)
Age: 41
End: 2021-01-03

## 2021-03-10 ENCOUNTER — VIRTUAL VISIT (OUTPATIENT)
Dept: UROLOGY | Facility: CLINIC | Age: 41
End: 2021-03-10
Attending: NURSE PRACTITIONER
Payer: COMMERCIAL

## 2021-03-10 DIAGNOSIS — N39.46 MIXED INCONTINENCE: Primary | ICD-10-CM

## 2021-03-10 PROCEDURE — 99213 OFFICE O/P EST LOW 20 MIN: CPT | Mod: 95 | Performed by: OBSTETRICS & GYNECOLOGY

## 2021-03-10 NOTE — PROGRESS NOTES
Elizabeth is a 41 year old who is being evaluated via a billable video visit.      How would you like to obtain your AVS? MyChart  If the video visit is dropped, the invitation should be resent by: Text to cell phone: [phone]  Will anyone else be joining your video visit? No      Video Start Time: 9:21 AM     March 10, 2021    Referring Provider: Krik Redd MD  [address] [place] [id number]    Primary Care Provider: Edwin Duff    CC: urinary incontinence    HPI:  Elizabeth Lopez is a 41 year old female who presents for evaluation of her pelvic floor symptoms.  She has 2+ year h/o urinary incontinence. C/O mixed incontinence, U>S. Symptoms occur daily, wears a pad. Has tried PFPT in the past without improvement.  Was offered surgery, but declined. Has class 1 obesity and is on metformin. Will be incontinent with exercise.    Prolapse:  Do you feel a vaginal bulge? no                                      Pressure? no   Do you have to place your fingers in the vagina or in the rectum to have a bowel movement? no  Impact to quality of life? -     Stress Incontinence:  Do you leak urine with cough, sneeze, exercise? yes  How often do you leak with cough, sneeze, exercise?  daily  How much do you usually leak? More than drops   Do you wear a pad? yes If so; light  Impact to quality of life? moderate    Urge Incontinence:  Do you often get sudden urges to urinate? yes  How often do have urges? daily  If so, do you leak with these urges? yes  How much do you usually leak? More than drops  Impact to quality of life? moderate    Voids/day:10-12  Nocturia: 2-3  Fluid intake: 2-3 liters  Caffeine: 2    Urinating:  Difficulty starting urination or strain to void? no  Weak or intermittent stream? no  Incomplete emptying or dribbling? yes  Pain or burning with urination? no  Any blood in your urine? no    GI:  Constipation? yes  Frequency stools daily    Straining for stools yes  Stool  consistency normal     Ever leak stool (Accidental Bowel Leakage)? no      If so, how often?               -      If so, do you leak?                   -      Soiling without sensation? -  History of irritable bowel or Crohn's? -    Sexual/Pain:  Are you currently having sex?. yes  Pain with sex?   occ   Sexual Partner: male  Do any of these symptoms interfere with sex? no  Impact to quality of life? -    Prior therapy:  Ever done pelvic floor physical therapy? no  Trial of medication? no  Have you ever tried a pessary? no    Medical History:  Do you have?   High Cholesterol? no     Diabetes? no  High Blood pressure? yes     Recurrent UTIs? no  Sleep Apnea? Yes, untreated  Other medical problems: no    Surgical History:    Hysterectomy? no   Bladder Surgery? no   Other? pannelectomy     OB/Gyn History:  Pregnancies? 6  Deliveries? 3  Vaginal 3  Section 0  Current birth control? condoms  Periods? regular  When was the first day of your last period? -  Last Pap smear? - Any abnormal? HPV  Last mammogram? -  Last colonoscopy? -    Medications/Vitamins/Supplements: metformin    Drug Allergies: no    Latex Allergy: no  Iodine Allergy no    Family History: (list relationship and age at diagnosis)  Breast cancer? no   Ovarian cancer? no   Colon cancer? Dad  Other? no    Social History:  Marital status:   Do you/ have you ever smoke(d)  cigarettes? no  Drink more than 1 alcoholic beverage a day?  occ  Occupation?     In the past 3 months have you regularly experienced:  Chest pain w/ walking/exercise? no                   Unusual headaches? no  Leg pain w/ walking/exercise? no                       Easy bruising? no  Difficulty breathing w/ walking/exercise? no  Problems with vision? no  Dizziness, falls, or fainting? no  Excessive bleeding from cuts, gums, surgery? no  Other: no    Past Medical History:   Diagnosis Date     Abnormal Pap smear of cervix 10/18/2018    10/18/18: See problem list.       Cervical high risk HPV (human papillomavirus) test positive 10/18/2018, 21    See problem list     Hypertension      Migraine        No past surgical history on file.    Social History     Socioeconomic History     Marital status:      Spouse name: Not on file     Number of children: Not on file     Years of education: Not on file     Highest education level: Not on file   Occupational History     Not on file   Social Needs     Financial resource strain: Not on file     Food insecurity     Worry: Not on file     Inability: Not on file     Transportation needs     Medical: Not on file     Non-medical: Not on file   Tobacco Use     Smoking status: Former Smoker     Packs/day: 1.00     Quit date: 2020     Years since quittin.7     Smokeless tobacco: Never Used   Substance and Sexual Activity     Alcohol use: No     Comment: 2-3     Drug use: No     Sexual activity: Yes     Partners: Male   Lifestyle     Physical activity     Days per week: Not on file     Minutes per session: Not on file     Stress: Not on file   Relationships     Social connections     Talks on phone: Not on file     Gets together: Not on file     Attends Scientologist service: Not on file     Active member of club or organization: Not on file     Attends meetings of clubs or organizations: Not on file     Relationship status: Not on file     Intimate partner violence     Fear of current or ex partner: Not on file     Emotionally abused: Not on file     Physically abused: Not on file     Forced sexual activity: Not on file   Other Topics Concern     Parent/sibling w/ CABG, MI or angioplasty before 65F 55M? No   Social History Narrative     Not on file       Family History   Problem Relation Age of Onset     Thyroid Disease Mother      Heart Disease Mother      Hypertension Mother      Obesity Mother      Aneurysm Father      Hypertension Father      Obesity Father        ROS    No Known Allergies    Current Outpatient Medications    Medication     ammonium lactate (AMLACTIN) 12 % external cream     buPROPion (WELLBUTRIN XL) 150 MG 24 hr tablet     buPROPion (WELLBUTRIN) 100 MG tablet     Emollient (AQUAPHOR ADVANCED THERAPY) OINT     ferrous sulfate (FEROSUL) 325 (65 Fe) MG tablet     fluocinonide (LIDEX) 0.05 % external ointment     hydrochlorothiazide (HYDRODIURIL) 25 MG tablet     ibuprofen (ADVIL/MOTRIN) 600 MG tablet     metFORMIN (GLUCOPHAGE) 500 MG tablet     naltrexone (DEPADE/REVIA) 50 MG tablet     nicotine (NICODERM CQ) 21 MG/24HR 24 hr patch     nicotine (NICODERM CQ) 7 MG/24HR 24 hr patch     nicotine (NICORETTE) 2 MG gum     order for DME     phentermine (ADIPEX-P) 37.5 MG tablet     topiramate (TOPAMAX) 100 MG tablet     topiramate (TOPAMAX) 50 MG tablet     triamcinolone (ARISTOCORT HP) 0.5 % external cream     vitamin D3 (CHOLECALCIFEROL) 2000 units (50 mcg) tablet     No current facility-administered medications for this visit.        There were no vitals taken for this visit. No LMP recorded. There is no height or weight on file to calculate BMI.  Ms. Lopez is alert, comfortable in no acute distress, non-labored breathing.     A/P: Elizabeth Lopez is a 41 year old F with mixed urinary incontinence    Will schedule for face-to-face visit.    Octavio Guevara MD  Professor, OB/GYN  Urogynecologist  CC  Patient Care Team:  [facility] as [facility]CP - Phu Rubin MD as Assigned Musculoskeletal Provider  Tiffany Sanchez MD as MD (Surgery)  PEYTON Fernandez MD as Referring Physician (Plastic Surgery)  PEYTON Fernandez MD as Assigned Surgical Provider  Basil Cano MD as Assigned PCP  Ashleigh Awan PA-C as Physician Assistant (Surgery)  OCTAVIO GUEVARA      Video-Visit Details    Type of service:  Video Visit    Video End Time:9:40 AM    Originating Location (pt. Location): Home    Distant Location (provider location):  [facility]      Platform used for Video Visit: [facility]

## 2021-03-10 NOTE — LETTER
3/10/2021       RE: Elizabeth Lopez  [address] [facility] [address]     Dear Colleague,    Thank you for referring your patient, Elizabeth Lopez, to the [facility] [place] [facility]. Please see a copy of my visit note below.    Elizabeth is a 41 year old who is being evaluated via a billable video visit.      How would you like to obtain your AVS? MyChart  If the video visit is dropped, the invitation should be resent by: Text to cell phone: [phone]  Will anyone else be joining your video visit? No      Video Start Time: 9:21 AM     March 10, 2021    Referring Provider: Kirk Redd MD  [address] [place] [address]    Primary Care Provider: Edwin Duff    CC: urinary incontinence    HPI:  Elizabeth Lopez is a 41 year old female who presents for evaluation of her pelvic floor symptoms.  She has 2+ year h/o urinary incontinence. C/O mixed incontinence, U>S. Symptoms occur daily, wears a pad. Has tried PFPT in the past without improvement.  Was offered surgery, but declined. Has class 1 obesity and is on metformin. Will be incontinent with exercise.    Prolapse:  Do you feel a vaginal bulge? no                                      Pressure? no   Do you have to place your fingers in the vagina or in the rectum to have a bowel movement? no  Impact to quality of life? -     Stress Incontinence:  Do you leak urine with cough, sneeze, exercise? yes  How often do you leak with cough, sneeze, exercise?  daily  How much do you usually leak? More than drops   Do you wear a pad? yes If so; light  Impact to quality of life? moderate    Urge Incontinence:  Do you often get sudden urges to urinate? yes  How often do have urges? daily  If so, do you leak with these urges? yes  How much do you usually leak? More than drops  Impact to quality of life? moderate    Voids/day:10-12  Nocturia: 2-3  Fluid intake:  2-3 liters  Caffeine: 2    Urinating:  Difficulty starting urination or strain to void? no  Weak or intermittent stream? no  Incomplete emptying or dribbling? yes  Pain or burning with urination? no  Any blood in your urine? no    GI:  Constipation? yes  Frequency stools daily    Straining for stools yes  Stool consistency normal     Ever leak stool (Accidental Bowel Leakage)? no      If so, how often?               -      If so, do you leak?                   -      Soiling without sensation? -  History of irritable bowel or Crohn's? -    Sexual/Pain:  Are you currently having sex?. yes  Pain with sex?   occ   Sexual Partner: male  Do any of these symptoms interfere with sex? no  Impact to quality of life? -    Prior therapy:  Ever done pelvic floor physical therapy? no  Trial of medication? no  Have you ever tried a pessary? no    Medical History:  Do you have?   High Cholesterol? no     Diabetes? no  High Blood pressure? yes     Recurrent UTIs? no  Sleep Apnea? Yes, untreated  Other medical problems: no    Surgical History:    Hysterectomy? no   Bladder Surgery? no   Other? pannelectomy     OB/Gyn History:  Pregnancies? 6  Deliveries? 3  Vaginal 3  Section 0  Current birth control? condoms  Periods? regular  When was the first day of your last period? -  Last Pap smear? - Any abnormal? HPV  Last mammogram? -  Last colonoscopy? -    Medications/Vitamins/Supplements: metformin    Drug Allergies: no    Latex Allergy: no  Iodine Allergy no    Family History: (list relationship and age at diagnosis)  Breast cancer? no   Ovarian cancer? no   Colon cancer? Dad  Other? no    Social History:  Marital status:   Do you/ have you ever smoke(d)  cigarettes? no  Drink more than 1 alcoholic beverage a day?  occ  Occupation?     In the past 3 months have you regularly experienced:  Chest pain w/ walking/exercise? no                   Unusual headaches? no  Leg pain w/ walking/exercise? no                        Easy bruising? no  Difficulty breathing w/ walking/exercise? no  Problems with vision? no  Dizziness, falls, or fainting? no  Excessive bleeding from cuts, gums, surgery? no  Other: no    Past Medical History:   Diagnosis Date     Abnormal Pap smear of cervix 10/18/2018    10/18/18: See problem list.      Cervical high risk HPV (human papillomavirus) test positive 10/18/2018, 21    See problem list     Hypertension      Migraine        No past surgical history on file.    Social History     Socioeconomic History     Marital status:      Spouse name: Not on file     Number of children: Not on file     Years of education: Not on file     Highest education level: Not on file   Occupational History     Not on file   Social Needs     Financial resource strain: Not on file     Food insecurity     Worry: Not on file     Inability: Not on file     Transportation needs     Medical: Not on file     Non-medical: Not on file   Tobacco Use     Smoking status: Former Smoker     Packs/day: 1.00     Quit date: 2020     Years since quittin.7     Smokeless tobacco: Never Used   Substance and Sexual Activity     Alcohol use: No     Comment: 2-3     Drug use: No     Sexual activity: Yes     Partners: Male   Lifestyle     Physical activity     Days per week: Not on file     Minutes per session: Not on file     Stress: Not on file   Relationships     Social connections     Talks on phone: Not on file     Gets together: Not on file     Attends Pentecostalism service: Not on file     Active member of club or organization: Not on file     Attends meetings of clubs or organizations: Not on file     Relationship status: Not on file     Intimate partner violence     Fear of current or ex partner: Not on file     Emotionally abused: Not on file     Physically abused: Not on file     Forced sexual activity: Not on file   Other Topics Concern     Parent/sibling w/ CABG, MI or angioplasty before 65F 55M? No   Social  History Narrative     Not on file       Family History   Problem Relation Age of Onset     Thyroid Disease Mother      Heart Disease Mother      Hypertension Mother      Obesity Mother      Aneurysm Father      Hypertension Father      Obesity Father        ROS    No Known Allergies    Current Outpatient Medications   Medication     ammonium lactate (AMLACTIN) 12 % external cream     buPROPion (WELLBUTRIN XL) 150 MG 24 hr tablet     buPROPion (WELLBUTRIN) 100 MG tablet     Emollient (AQUAPHOR ADVANCED THERAPY) OINT     ferrous sulfate (FEROSUL) 325 (65 Fe) MG tablet     fluocinonide (LIDEX) 0.05 % external ointment     hydrochlorothiazide (HYDRODIURIL) 25 MG tablet     ibuprofen (ADVIL/MOTRIN) 600 MG tablet     metFORMIN (GLUCOPHAGE) 500 MG tablet     naltrexone (DEPADE/REVIA) 50 MG tablet     nicotine (NICODERM CQ) 21 MG/24HR 24 hr patch     nicotine (NICODERM CQ) 7 MG/24HR 24 hr patch     nicotine (NICORETTE) 2 MG gum     order for DME     phentermine (ADIPEX-P) 37.5 MG tablet     topiramate (TOPAMAX) 100 MG tablet     topiramate (TOPAMAX) 50 MG tablet     triamcinolone (ARISTOCORT HP) 0.5 % external cream     vitamin D3 (CHOLECALCIFEROL) 2000 units (50 mcg) tablet     No current facility-administered medications for this visit.        There were no vitals taken for this visit. No LMP recorded. There is no height or weight on file to calculate BMI.  Ms. Lopez is alert, comfortable in no acute distress, non-labored breathing.     A/P: Elizabeth Lopez is a 41 year old F with mixed urinary incontinence    Will schedule for face-to-face visit.    Kirk Redd MD  Professor, OB/GYN  Urogynecologist  CC  Patient Care Team:  [facility]  Phu Varma MD as Assigned Musculoskeletal Provider  Tiffany Sanchez MD as MD (Surgery)  PEYTON Fernandez MD as Referring Physician (Plastic Surgery)  PEYTON Fernandez MD as Assigned Surgical Provider  Basil Cano  MD Luís as Assigned PCP  Ashleigh Awan PA-C as Physician Assistant (Surgery)  OCTAVIO GUEVARA      Video-Visit Details    Type of service:  Video Visit    Video End Time:9:40 AM    Originating Location (pt. Location): Home    Distant Location (provider location):  [facility]     Platform used for Video Visit: [employer]

## 2021-03-15 ENCOUNTER — HOSPITAL ENCOUNTER (OUTPATIENT)
Facility: CLINIC | Age: 41
End: 2021-03-15
Attending: INTERNAL MEDICINE | Admitting: INTERNAL MEDICINE
Payer: COMMERCIAL

## 2021-03-15 DIAGNOSIS — Z11.59 ENCOUNTER FOR SCREENING FOR OTHER VIRAL DISEASES: ICD-10-CM

## 2021-03-17 ENCOUNTER — TELEPHONE (OUTPATIENT)
Dept: OBGYN | Facility: CLINIC | Age: 41
End: 2021-03-17

## 2021-03-17 ENCOUNTER — OFFICE VISIT (OUTPATIENT)
Dept: UROLOGY | Facility: CLINIC | Age: 41
End: 2021-03-17
Attending: OBSTETRICS & GYNECOLOGY
Payer: COMMERCIAL

## 2021-03-17 VITALS
SYSTOLIC BLOOD PRESSURE: 154 MMHG | DIASTOLIC BLOOD PRESSURE: 101 MMHG | WEIGHT: 189 LBS | HEART RATE: 86 BPM | BODY MASS INDEX: 33.69 KG/M2

## 2021-03-17 DIAGNOSIS — N81.6 RECTOCELE: ICD-10-CM

## 2021-03-17 DIAGNOSIS — N81.11 CYSTOCELE, MIDLINE: ICD-10-CM

## 2021-03-17 DIAGNOSIS — N39.46 MIXED INCONTINENCE: Primary | ICD-10-CM

## 2021-03-17 DIAGNOSIS — N81.4 PROLAPSE OF UTERUS: ICD-10-CM

## 2021-03-17 PROCEDURE — G0463 HOSPITAL OUTPT CLINIC VISIT: HCPCS

## 2021-03-17 PROCEDURE — 99213 OFFICE O/P EST LOW 20 MIN: CPT | Performed by: OBSTETRICS & GYNECOLOGY

## 2021-03-17 RX ORDER — PHENAZOPYRIDINE HYDROCHLORIDE 100 MG/1
200 TABLET, FILM COATED ORAL ONCE
Status: CANCELLED | OUTPATIENT
Start: 2021-03-17 | End: 2021-03-17

## 2021-03-17 RX ORDER — TOLTERODINE 4 MG/1
4 CAPSULE, EXTENDED RELEASE ORAL DAILY
Qty: 90 CAPSULE | Refills: 1 | Status: ON HOLD | OUTPATIENT
Start: 2021-03-17 | End: 2021-11-16

## 2021-03-17 RX ORDER — CEFAZOLIN SODIUM 2 G/100ML
2 INJECTION, SOLUTION INTRAVENOUS SEE ADMIN INSTRUCTIONS
Status: CANCELLED | OUTPATIENT
Start: 2021-03-17

## 2021-03-17 RX ORDER — CEFAZOLIN SODIUM 2 G/100ML
2 INJECTION, SOLUTION INTRAVENOUS
Status: CANCELLED | OUTPATIENT
Start: 2021-03-17

## 2021-03-17 RX ORDER — ACETAMINOPHEN 325 MG/1
975 TABLET ORAL ONCE
Status: CANCELLED | OUTPATIENT
Start: 2021-03-17 | End: 2021-03-17

## 2021-03-17 ASSESSMENT — PAIN SCALES - GENERAL: PAINLEVEL: NO PAIN (0)

## 2021-03-17 NOTE — PROGRESS NOTES
2021    Referring Provider: No referring provider defined for this encounter.    Primary Care Provider: Edwin Duff    CC: mixed urinary incontinence    HPI:  Elizabeth Lopez is a 41 year old female who presents for evaluation of her pelvic floor symptoms.  She had virtual appointment with me on 3/10/21. She returns today for PE.        Past Medical History:   Diagnosis Date     Abnormal Pap smear of cervix 10/18/2018    10/18/18: See problem list.      Cervical high risk HPV (human papillomavirus) test positive 10/18/2018, 19    See problem list     Hypertension      Migraine        History reviewed. No pertinent surgical history.    Social History     Socioeconomic History     Marital status:      Spouse name: Not on file     Number of children: Not on file     Years of education: Not on file     Highest education level: Not on file   Occupational History     Not on file   Social Needs     Financial resource strain: Not on file     Food insecurity     Worry: Not on file     Inability: Not on file     Transportation needs     Medical: Not on file     Non-medical: Not on file   Tobacco Use     Smoking status: Former Smoker     Packs/day: 1.00     Quit date: 2020     Years since quittin.7     Smokeless tobacco: Never Used   Substance and Sexual Activity     Alcohol use: No     Comment: 2-3     Drug use: No     Sexual activity: Yes     Partners: Male   Lifestyle     Physical activity     Days per week: Not on file     Minutes per session: Not on file     Stress: Not on file   Relationships     Social connections     Talks on phone: Not on file     Gets together: Not on file     Attends Evangelical service: Not on file     Active member of club or organization: Not on file     Attends meetings of clubs or organizations: Not on file     Relationship status: Not on file     Intimate partner violence     Fear of current or ex partner: Not on file     Emotionally abused: Not on  file     Physically abused: Not on file     Forced sexual activity: Not on file   Other Topics Concern     Parent/sibling w/ CABG, MI or angioplasty before 65F 55M? No   Social History Narrative     Not on file       Family History   Problem Relation Age of Onset     Thyroid Disease Mother      Heart Disease Mother      Hypertension Mother      Obesity Mother      Aneurysm Father      Hypertension Father      Obesity Father        ROS    No Known Allergies    Current Outpatient Medications   Medication     metFORMIN (GLUCOPHAGE) 500 MG tablet     naltrexone (DEPADE/REVIA) 50 MG tablet     nicotine (NICODERM CQ) 21 MG/24HR 24 hr patch     tolterodine ER (DETROL LA) 4 MG 24 hr capsule     topiramate (TOPAMAX) 100 MG tablet     topiramate (TOPAMAX) 50 MG tablet     ammonium lactate (AMLACTIN) 12 % external cream     Emollient (AQUAPHOR ADVANCED THERAPY) OINT     fluocinonide (LIDEX) 0.05 % external ointment     hydrochlorothiazide (HYDRODIURIL) 25 MG tablet     ibuprofen (ADVIL/MOTRIN) 600 MG tablet     nicotine (NICORETTE) 2 MG gum     order for DME     phentermine (ADIPEX-P) 37.5 MG tablet     triamcinolone (ARISTOCORT HP) 0.5 % external cream     vitamin D3 (CHOLECALCIFEROL) 2000 units (50 mcg) tablet     No current facility-administered medications for this visit.        BP (!) 154/101   Pulse 86   Wt 85.7 kg (189 lb)   LMP 03/12/2021   BMI 33.69 kg/m   Patient's last menstrual period was 03/12/2021. Body mass index is 33.69 kg/m .  Ms. Lopez is alert, comfortable in no acute distress, non-labored breathing.   Abdomen is soft, non-tender, non-distended, no CVAT.    Normal external female genitalia. The urethra was normal appearing without masses, NT.    She has a cystocele to beyond HR, rectocele to HR and uterine descent to -3 support on supine strain.  Speculum and bimanual exam are remarkable for normal appearing vaginal mucosa.      4/5 kegels.      POPQ  Aa 2   Ba 2  C -3  D -6  GH 4  PB 3  TVL 10  Ap  0  Bp 0    neg SST  VOID 50 ml  PVR minimal mL in and out cath  Urine dip ND    A/P: Elizabeth Lopez is a 41 year old F with mixed urinary incontinence, cystocele, rectocele, and uterine descent    We discussed the diagnosis and treatment options. Pt has stress urinary incontinence. Treatment options to include:  1) observation with f/u in 6 -12 months  2) pelvic floor physical therapy  3) surgical management    We discussed her diagnosis and reviewed the [facility] On-Line Interactive POP-Q Tool about stress urinary incontinence regarding diagnosis and treatment of CARISSA. Surgery would be TVT for urinary incontinence. We discussed that the risks to the procedure include but not limited to bleeding, infection, injury to adjacent organs including bowel, bladder, and blood vessels, conversion to open procedure, de janusz or worsening stress incontinence or urge incontinence, need for post-operative diana catheter, need for further procedures including for mesh extrusion or erosion and possible failure of the procedure with recurrence of her prolapse.  Patient expressed understanding and agreeable to proceed.    Will have pt return for office cystometrics to determine CARISSA. If present will proceed as outlined above.    We discussed the diagnosis and treatment options. Treatment options to include:  1) observation with f/u in 6 -12 months  2) pessary fitting  3) surgical management    Pt wishes to NOT undergo a hysterectomy. Surgical options to include A&P repair and uterosacral ligament suspension and possible TVT as outlined above.     We discussed that the risks to the procedure include but not limited to bleeding, infection, injury to adjacent organs including bowel, bladder, and blood vessels, conversion to open procedure, de janusz or worsening stress incontinence or urge incontinence, need for post-operative diana catheter, need for further procedures including for mesh extrusion or erosion and possible failure of the  procedure with recurrence of her prolapse.  Patient expressed understanding and agreeable to proceed.    Kirk Redd MD  Professor, OB/GYN  Urogynecologist  CC  Patient Care Team:  Edwin Duff as PCP - General  Phu Varma MD as Assigned Musculoskeletal Provider  Tiffany Sanchez MD as MD (Surgery)  PEYTON Fernandez MD as Referring Physician (Plastic Surgery)  PEYTON Fernandez MD as Assigned Surgical Provider  Basil Cano MD as Assigned PCP

## 2021-03-17 NOTE — NURSING NOTE
Patient voided 50 ml  Bladder scan showed 3 ml of residual urine.   Patient tested negative for urinary incontinence,

## 2021-03-17 NOTE — LETTER
3/17/2021       RE: Elizabeth Lopez  [address]  [place] [address]     Dear Colleague,    Thank you for referring your patient, Elizabeth Lopez, to the [facility] [place] [facility]. Please see a copy of my visit note below.    2021    Referring Provider: No referring provider defined for this encounter.    Primary Care Provider: Edwin Duff    CC: mixed urinary incontinence    HPI:  Elizabeth Lopez is a 41 year old female who presents for evaluation of her pelvic floor symptoms.  She had virtual appointment with me on 3/10/21. She returns today for PE.        Past Medical History:   Diagnosis Date     Abnormal Pap smear of cervix 10/18/2018    10/18/18: See problem list.      Cervical high risk HPV (human papillomavirus) test positive 10/18/2018, 19    See problem list     Hypertension      Migraine        History reviewed. No pertinent surgical history.    Social History     Socioeconomic History     Marital status:      Spouse name: Not on file     Number of children: Not on file     Years of education: Not on file     Highest education level: Not on file   Occupational History     Not on file   Social Needs     Financial resource strain: Not on file     Food insecurity     Worry: Not on file     Inability: Not on file     Transportation needs     Medical: Not on file     Non-medical: Not on file   Tobacco Use     Smoking status: Former Smoker     Packs/day: 1.00     Quit date: 2020     Years since quittin.7     Smokeless tobacco: Never Used   Substance and Sexual Activity     Alcohol use: No     Comment: 2-3     Drug use: No     Sexual activity: Yes     Partners: Male   Lifestyle     Physical activity     Days per week: Not on file     Minutes per session: Not on file     Stress: Not on file   Relationships     Social connections     Talks on phone: Not on file     Gets together: Not  on file     Attends Buddhism service: Not on file     Active member of club or organization: Not on file     Attends meetings of clubs or organizations: Not on file     Relationship status: Not on file     Intimate partner violence     Fear of current or ex partner: Not on file     Emotionally abused: Not on file     Physically abused: Not on file     Forced sexual activity: Not on file   Other Topics Concern     Parent/sibling w/ CABG, MI or angioplasty before 65F 55M? No   Social History Narrative     Not on file       Family History   Problem Relation Age of Onset     Thyroid Disease Mother      Heart Disease Mother      Hypertension Mother      Obesity Mother      Aneurysm Father      Hypertension Father      Obesity Father        ROS    No Known Allergies    Current Outpatient Medications   Medication     metFORMIN (GLUCOPHAGE) 500 MG tablet     naltrexone (DEPADE/REVIA) 50 MG tablet     nicotine (NICODERM CQ) 21 MG/24HR 24 hr patch     tolterodine ER (DETROL LA) 4 MG 24 hr capsule     topiramate (TOPAMAX) 100 MG tablet     topiramate (TOPAMAX) 50 MG tablet     ammonium lactate (AMLACTIN) 12 % external cream     Emollient (AQUAPHOR ADVANCED THERAPY) OINT     fluocinonide (LIDEX) 0.05 % external ointment     hydrochlorothiazide (HYDRODIURIL) 25 MG tablet     ibuprofen (ADVIL/MOTRIN) 600 MG tablet     nicotine (NICORETTE) 2 MG gum     order for DME     phentermine (ADIPEX-P) 37.5 MG tablet     triamcinolone (ARISTOCORT HP) 0.5 % external cream     vitamin D3 (CHOLECALCIFEROL) 2000 units (50 mcg) tablet     No current facility-administered medications for this visit.        BP (!) 154/101   Pulse 86   Wt 85.7 kg (189 lb)   LMP 03/12/2021   BMI 33.69 kg/m   Patient's last menstrual period was 03/12/2021. Body mass index is 33.69 kg/m .  Ms. Lopez is alert, comfortable in no acute distress, non-labored breathing.   Abdomen is soft, non-tender, non-distended, no CVAT.    Normal external female genitalia. The  urethra was normal appearing without masses, NT.    She has a cystocele to beyond HR, rectocele to HR and uterine descent to -3 support on supine strain.  Speculum and bimanual exam are remarkable for normal appearing vaginal mucosa.      4/5 kegels.      POPQ  Aa 2   Ba 2  C -3  D -6  GH 4  PB 3  TVL 10  Ap 0  Bp 0    neg SST  VOID 50 ml  PVR minimal mL in and out cath  Urine dip ND    A/P: Elizabeth Lopez is a 41 year old F with mixed urinary incontinence, cystocele, rectocele, and uterine descent    We discussed the diagnosis and treatment options. Pt has stress urinary incontinence. Treatment options to include:  1) observation with f/u in 6 -12 months  2) pelvic floor physical therapy  3) surgical management    We discussed her diagnosis and reviewed the [employer] On-Line Interactive POP-Q Tool about stress urinary incontinence regarding diagnosis and treatment of CARISSA. Surgery would be TVT for urinary incontinence. We discussed that the risks to the procedure include but not limited to bleeding, infection, injury to adjacent organs including bowel, bladder, and blood vessels, conversion to open procedure, de janusz or worsening stress incontinence or urge incontinence, need for post-operative diana catheter, need for further procedures including for mesh extrusion or erosion and possible failure of the procedure with recurrence of her prolapse.  Patient expressed understanding and agreeable to proceed.    Will have pt return for office cystometrics to determine CARISSA. If present will proceed as outlined above.    We discussed the diagnosis and treatment options. Treatment options to include:  1) observation with f/u in 6 -12 months  2) pessary fitting  3) surgical management    Pt wishes to NOT undergo a hysterectomy. Surgical options to include A&P repair and uterosacral ligament suspension and possible TVT as outlined above.     We discussed that the risks to the procedure include but not limited to bleeding,  infection, injury to adjacent organs including bowel, bladder, and blood vessels, conversion to open procedure, de janusz or worsening stress incontinence or urge incontinence, need for post-operative diana catheter, need for further procedures including for mesh extrusion or erosion and possible failure of the procedure with recurrence of her prolapse.  Patient expressed understanding and agreeable to proceed.    Kirk Redd MD  Professor, OB/GYN  Urogynecologist  CC  Patient Care Team:  [facility] as PCP - General  Phu Varma MD as Assigned Musculoskeletal Provider  Tiffany Sanchez MD as MD (Surgery)  PEYTON Fernandez MD as Referring Physician (Plastic Surgery)  PEYTON Fernandez MD as Assigned Surgical Provider  Basil Cnao MD as Assigned PCP

## 2021-03-30 ENCOUNTER — TELEPHONE (OUTPATIENT)
Dept: OBGYN | Facility: CLINIC | Age: 41
End: 2021-03-30

## 2021-04-02 ENCOUNTER — TELEPHONE (OUTPATIENT)
Dept: OBGYN | Facility: CLINIC | Age: 41
End: 2021-04-02

## 2021-04-21 ENCOUNTER — OFFICE VISIT (OUTPATIENT)
Dept: UROLOGY | Facility: CLINIC | Age: 41
End: 2021-04-21
Attending: OBSTETRICS & GYNECOLOGY
Payer: COMMERCIAL

## 2021-04-21 VITALS — HEART RATE: 101 BPM | DIASTOLIC BLOOD PRESSURE: 101 MMHG | SYSTOLIC BLOOD PRESSURE: 152 MMHG

## 2021-04-21 DIAGNOSIS — N81.6 RECTOCELE: ICD-10-CM

## 2021-04-21 DIAGNOSIS — N81.11 CYSTOCELE, MIDLINE: ICD-10-CM

## 2021-04-21 DIAGNOSIS — N39.46 MIXED INCONTINENCE: Primary | ICD-10-CM

## 2021-04-21 DIAGNOSIS — N81.4 PROLAPSE OF UTERUS: ICD-10-CM

## 2021-04-21 DIAGNOSIS — E66.3 OVERWEIGHT: ICD-10-CM

## 2021-04-21 PROCEDURE — 99213 OFFICE O/P EST LOW 20 MIN: CPT | Performed by: OBSTETRICS & GYNECOLOGY

## 2021-04-21 PROCEDURE — G0463 HOSPITAL OUTPT CLINIC VISIT: HCPCS

## 2021-04-21 RX ORDER — CEFAZOLIN SODIUM 2 G/100ML
2 INJECTION, SOLUTION INTRAVENOUS SEE ADMIN INSTRUCTIONS
Status: CANCELLED | OUTPATIENT
Start: 2021-04-21

## 2021-04-21 RX ORDER — ACETAMINOPHEN 325 MG/1
975 TABLET ORAL ONCE
Status: CANCELLED | OUTPATIENT
Start: 2021-04-21 | End: 2021-04-21

## 2021-04-21 RX ORDER — CEFAZOLIN SODIUM 2 G/100ML
2 INJECTION, SOLUTION INTRAVENOUS
Status: CANCELLED | OUTPATIENT
Start: 2021-04-21

## 2021-04-21 RX ORDER — PHENAZOPYRIDINE HYDROCHLORIDE 100 MG/1
200 TABLET, FILM COATED ORAL ONCE
Status: CANCELLED | OUTPATIENT
Start: 2021-04-21 | End: 2021-04-21

## 2021-04-21 ASSESSMENT — PAIN SCALES - GENERAL: PAINLEVEL: NO PAIN (0)

## 2021-04-21 NOTE — LETTER
4/21/2021       RE: Elizabeth Gil  [address] [address]     Dear Colleague,    Thank you for referring your patient, Elizabeth Gil, to the [facility]. Please see a copy of my visit note below.    April 21, 2021    Return visit    Patient returns today for cough stress test. She presented with a full bladder today. SST pos.    BP (!) 152/101   Pulse 101   She is comfortable, in no distress, non-labored breathing.     A/P: 41 year old F with CARISSA, rectocele, cystocele    Will schedule for TVT, A&P repair    Kirk Redd MD  Professor, OB/GYN  Urogynecologist    CC  Patient Care Team:  [facility] as PCP - General  Phu Varma MD as Assigned Musculoskeletal Provider  Tiffany Sacnhez MD as MD (Surgery)  PEYTON Fernandez MD as Referring Physician (Plastic Surgery)  Kirk Redd MD as Assigned OBGYN Provider  Valerie Cortez PA-C as Assigned Surgical Provider  Danna Enrique APRN CNP as Assigned PCP  SELF, REFERRED

## 2021-04-21 NOTE — PROGRESS NOTES
April 21, 2021    Return visit    Patient returns today for cough stress test. She presented with a full bladder today. SST pos.    BP (!) 152/101   Pulse 101   She is comfortable, in no distress, non-labored breathing.     A/P: 41 year old F with CARISSA, rectocele, cystocele    Will schedule for TVT, A&P repair    Kirk Redd MD  Professor, OB/GYN  Urogynecologist    CC  Patient Care Team:  [facility] as PCP - General  KojoPhu MD as Assigned Musculoskeletal Provider  Tiffany Sanchez MD as MD (Surgery)  PEYTON Fernandez MD as Referring Physician (Plastic Surgery)  Kirk Redd MD as Assigned OBGYN Provider  Valerie Cortez PA-C as Assigned Surgical Provider  Danna Enrique APRN CNP as Assigned PCP  SELF, REFERRED

## 2021-04-21 NOTE — TELEPHONE ENCOUNTER
Pt needs to schedule a follow up appt to discuss refills.        6/2/20 OV:   2. Overweight  -Review Adipex continued use at next in-person visit, hopefully in 3 months; if not effective for weight loss will discontinue.   - phentermine (ADIPEX-P) 37.5 MG tablet; Take 1 tablet (37.5 mg) by mouth every morning (before breakfast)  Dispense: 30 tablet; Refill: 3        Ramona Tyler RN   [facility]

## 2021-05-17 DIAGNOSIS — I10 HYPERTENSION, UNSPECIFIED TYPE: Primary | ICD-10-CM

## 2021-05-18 RX ORDER — HYDROCHLOROTHIAZIDE 25 MG/1
TABLET ORAL
Qty: 90 TABLET | Refills: 1 | Status: SHIPPED | OUTPATIENT
Start: 2021-05-18 | End: 2021-11-10

## 2021-05-18 NOTE — TELEPHONE ENCOUNTER
"Requested Prescriptions   Pending Prescriptions Disp Refills     hydrochlorothiazide (HYDRODIURIL) 25 MG tablet [Pharmacy Med Name: HYDROCHLOROTHIAZIDE 25MG TABLETS] 90 tablet      Sig: TAKE 1 TABLET(25 MG) BY MOUTH DAILY       Diuretics (Including Combos) Protocol Failed - 5/17/2021  9:50 PM        Failed - Blood pressure under 140/90 in past 12 months     BP Readings from Last 3 Encounters:   04/21/21 (!) 152/101   03/17/21 (!) 154/101   11/05/20 (!) 148/104                 Passed - Recent (12 mo) or future (30 days) visit within the authorizing provider's specialty     Patient has had an office visit with the authorizing provider or a provider within the authorizing providers department within the previous 12 mos or has a future within next 30 days. See \"Patient Info\" tab in inbasket, or \"Choose Columns\" in Meds & Orders section of the refill encounter.              Passed - Medication is active on med list        Passed - Patient is age 18 or older        Passed - No active pregancy on record        Passed - Normal serum creatinine on file in past 12 months     Recent Labs   Lab Test 11/05/20  0901   CR 0.66              Passed - Normal serum potassium on file in past 12 months     Recent Labs   Lab Test 11/05/20  0901   POTASSIUM 4.1                    Passed - Normal serum sodium on file in past 12 months     Recent Labs   Lab Test 11/05/20  0901                 Passed - No positive pregnancy test in past 12 months           Routing refill request to provider for review/approval because:  BP, medication reported historical        "

## 2021-05-20 DIAGNOSIS — N76.0 BACTERIAL VAGINOSIS: ICD-10-CM

## 2021-05-20 DIAGNOSIS — B96.89 BACTERIAL VAGINOSIS: ICD-10-CM

## 2021-05-24 RX ORDER — METRONIDAZOLE 7.5 MG/G
GEL VAGINAL
Qty: 70 G | OUTPATIENT
Start: 2021-05-24

## 2021-05-24 NOTE — TELEPHONE ENCOUNTER
Patient did not request this medication. Closing encounter    Kari Phipps RN   [facility]

## 2021-06-18 RX ORDER — PHENTERMINE HYDROCHLORIDE 37.5 MG/1
TABLET ORAL
Qty: 30 TABLET | OUTPATIENT
Start: 2021-06-18

## 2021-07-29 DIAGNOSIS — G43.819 OTHER MIGRAINE WITHOUT STATUS MIGRAINOSUS, INTRACTABLE: ICD-10-CM

## 2021-08-02 RX ORDER — TOPIRAMATE 50 MG/1
TABLET, FILM COATED ORAL
Qty: 30 TABLET | Refills: 0 | Status: SHIPPED | OUTPATIENT
Start: 2021-08-02 | End: 2021-09-07

## 2021-08-02 RX ORDER — TOPIRAMATE 100 MG/1
TABLET, FILM COATED ORAL
Qty: 30 TABLET | Refills: 0 | Status: SHIPPED | OUTPATIENT
Start: 2021-08-02 | End: 2021-09-07

## 2021-08-02 NOTE — TELEPHONE ENCOUNTER
Chapis refill given X1 - pt is overdue for clinic visit.    Екатерина Tiwari RN  [facility]

## 2021-08-26 PROBLEM — M25.561 RIGHT KNEE PAIN: Status: RESOLVED | Noted: 2020-10-01 | Resolved: 2021-08-26

## 2021-08-26 NOTE — PROGRESS NOTES
Therapist no longer works for the organization.    Elizabeth Gil was seen 2 times for evaluation and treatment.  Patient did not return for further treatment and current status is unknown.  Due to short treatment duration, no objective or functional changes were made.  Please see goal flow sheet from episode noted date below and initial evaluation for further information.    Linked Episodes   Type: Episode: Status: Noted: Resolved: Last update: Updated by:   PHYSICAL THERAPY Right knee pain 10/1/20 Active 10/1/2020  8/26/2021  2:16 PM Rina Noalsco, PT      Comments:

## 2021-09-03 ENCOUNTER — OFFICE VISIT (OUTPATIENT)
Dept: URGENT CARE | Facility: URGENT CARE | Age: 41
End: 2021-09-03
Payer: COMMERCIAL

## 2021-09-03 VITALS
DIASTOLIC BLOOD PRESSURE: 106 MMHG | OXYGEN SATURATION: 98 % | HEART RATE: 78 BPM | SYSTOLIC BLOOD PRESSURE: 165 MMHG | RESPIRATION RATE: 20 BRPM | TEMPERATURE: 98 F

## 2021-09-03 DIAGNOSIS — G56.01 CARPAL TUNNEL SYNDROME OF RIGHT WRIST: Primary | ICD-10-CM

## 2021-09-03 DIAGNOSIS — G43.819 OTHER MIGRAINE WITHOUT STATUS MIGRAINOSUS, INTRACTABLE: ICD-10-CM

## 2021-09-03 PROCEDURE — 99214 OFFICE O/P EST MOD 30 MIN: CPT | Performed by: STUDENT IN AN ORGANIZED HEALTH CARE EDUCATION/TRAINING PROGRAM

## 2021-09-03 RX ORDER — NAPROXEN 500 MG/1
500 TABLET ORAL 2 TIMES DAILY WITH MEALS
Qty: 60 TABLET | Refills: 3 | Status: ON HOLD | OUTPATIENT
Start: 2021-09-03 | End: 2021-12-07

## 2021-09-03 NOTE — PROGRESS NOTES
SUBJECTIVE:  Elizabeth Gil is an 41 year old female who presents for     Fingers getting numb bilaterally for 6 months or so.  - increased working ohurs as cleaning service   - worse over past couple days   - Right >L 2-3rd fingers   - Wakes up in AM having to flick wrist/hand   - Has been taking Ibuprofen three tabs everyday BID      PMH:   has a past medical history of Abnormal Pap smear of cervix (10/18/2018), Cervical high risk HPV (human papillomavirus) test positive (10/18/2018, 19), Hypertension, and Migraine.  Patient Active Problem List   Diagnosis     Elevated BP without diagnosis of hypertension     PASTORA III (cervical intraepithelial neoplasia III)     Female stress incontinence     Social anxiety disorder     Other migraine without status migrainosus, intractable     Iron deficiency     Eczema, unspecified type     Class 1 obesity due to excess calories with body mass index (BMI) of 34.0 to 34.9 in adult, unspecified whether serious comorbidity present     Hypertension     Social History     Socioeconomic History     Marital status:      Spouse name: None     Number of children: None     Years of education: None     Highest education level: None   Occupational History     None   Tobacco Use     Smoking status: Former Smoker     Packs/day: 1.00     Quit date: 2020     Years since quittin.2     Smokeless tobacco: Never Used   Substance and Sexual Activity     Alcohol use: No     Comment: 2-3     Drug use: No     Sexual activity: Yes     Partners: Male   Other Topics Concern     Parent/sibling w/ CABG, MI or angioplasty before 65F 55M? No   Social History Narrative     None     Social Determinants of Health     Financial Resource Strain:      Difficulty of Paying Living Expenses:    Food Insecurity:      Worried About Running Out of Food in the Last Year:      Ran Out of Food in the Last Year:    Transportation Needs:      Lack of Transportation (Medical):      Lack of  Transportation (Non-Medical):    Physical Activity:      Days of Exercise per Week:      Minutes of Exercise per Session:    Stress:      Feeling of Stress :    Social Connections:      Frequency of Communication with Friends and Family:      Frequency of Social Gatherings with Friends and Family:      Attends Congregation Services:      Active Member of Clubs or Organizations:      Attends Club or Organization Meetings:      Marital Status:    Intimate Partner Violence:      Fear of Current or Ex-Partner:      Emotionally Abused:      Physically Abused:      Sexually Abused:      Family History   Problem Relation Age of Onset     Thyroid Disease Mother      Heart Disease Mother      Hypertension Mother      Obesity Mother      Aneurysm Father      Hypertension Father      Obesity Father        ALLERGIES:  Patient has no known allergies.    Current Outpatient Medications   Medication     hydrochlorothiazide (HYDRODIURIL) 25 MG tablet     ibuprofen (ADVIL/MOTRIN) 600 MG tablet     metFORMIN (GLUCOPHAGE) 500 MG tablet     naltrexone (DEPADE/REVIA) 50 MG tablet     nicotine (NICODERM CQ) 21 MG/24HR 24 hr patch     tolterodine ER (DETROL LA) 4 MG 24 hr capsule     topiramate (TOPAMAX) 100 MG tablet     topiramate (TOPAMAX) 50 MG tablet     triamcinolone (ARISTOCORT HP) 0.5 % external cream     ammonium lactate (AMLACTIN) 12 % external cream     Emollient (AQUAPHOR ADVANCED THERAPY) OINT     fluocinonide (LIDEX) 0.05 % external ointment     nicotine (NICORETTE) 2 MG gum     order for DME     phentermine (ADIPEX-P) 37.5 MG tablet     vitamin D3 (CHOLECALCIFEROL) 2000 units (50 mcg) tablet     No current facility-administered medications for this visit.         ROS:  ROS is done and is negative for general/constitutional, eye, ENT, Respiratory, cardiovascular, GI, , Skin, musculoskeletal except as noted elsewhere.  All other review of systems negative except as noted elsewhere.    OBJECTIVE:  BP (!) 165/106   Pulse 78    Temp 98  F (36.7  C) (Tympanic)   Resp 20   SpO2 98%   GENERAL APPEARANCE: Alert, in no acute distress.  EYES: Conjunctivae clear.  RESP: Clear to auscultation bilaterally and no increased effort, wheezing, or retractions.  Cv: Regular rate and rhythm, no murmurs,good capillary refill.  SKIN: No ulcers, lesions or rash.  MUSCULOSKELETAL: Positive tinnels sign  NEURO: No gross deficits, CN 2-12 grossly intact.    RESULTS  No results found for any visits on 09/03/21..  No results found for this or any previous visit (from the past 48 hour(s)).    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 Wakes up and has to flick wrist which improves symptoms. Has similar symptoms in left hand/wrist but not as bad.  - Wrist brace  - Naproxen BID  - PT Referral  - Follow-up with PCP 1 month     HTN:  - Take hydrochlorothiazide daily  - Follow-up with PCP     PPE worn: Yes     See [facility] for orders, medications, letters, patient instructions    El Graham DO, MBA    DME (Durable Medical Equipment) Orders and Documentation  Orders Placed This Encounter   Procedures     Wrist/Arm/Hand Supplies Order      The patient was assessed and it was determined the patient is in need of the following listed DME Supplies/Equipment. Please complete supporting documentation below to demonstrate medical necessity.      Wrist/Arm/Hand Bracing Supplies Documentation  Patient requires the use of the ordered bracing device due to following medical need/condition: Carpal Tunnel Syndrome

## 2021-09-07 RX ORDER — TOPIRAMATE 100 MG/1
TABLET, FILM COATED ORAL
Qty: 30 TABLET | Refills: 0 | Status: SHIPPED | OUTPATIENT
Start: 2021-09-07 | End: 2021-12-21

## 2021-09-07 RX ORDER — TOPIRAMATE 50 MG/1
TABLET, FILM COATED ORAL
Qty: 30 TABLET | Refills: 0 | Status: SHIPPED | OUTPATIENT
Start: 2021-09-07 | End: 2021-12-21

## 2021-09-07 NOTE — TELEPHONE ENCOUNTER
"Requested Prescriptions   Pending Prescriptions Disp Refills     topiramate (TOPAMAX) 50 MG tablet [Pharmacy Med Name: TOPIRAMATE 50MG TABLETS] 30 tablet 0     Sig: TAKE ONE TABLET BY MOUTH EVERY NIGHT AT BEDTIME WITH 100MG TABLET       Anti-Seizure Meds Protocol  Failed - 9/3/2021  3:35 AM        Failed - Review Authorizing provider's last note.      Refer to last progress notes: confirm request is for original authorizing provider (cannot be through other providers).          Passed - Recent (12 mo) or future (30 days) visit within the authorizing provider's specialty     Patient has had an office visit with the authorizing provider or a provider within the authorizing providers department within the previous 12 mos or has a future within next 30 days. See \"Patient Info\" tab in inbasket, or \"Choose Columns\" in Meds & Orders section of the refill encounter.              Passed - Normal CBC on file in past 26 months     Recent Labs   Lab Test 11/05/20 0901   WBC 10.0   RBC 5.08   HGB 13.8   HCT 42.0                    Passed - Normal ALT or AST on file in past 26 months     Recent Labs   Lab Test 11/05/20 0901   ALT 22     Recent Labs   Lab Test 11/05/20 0901   AST 17             Passed - Normal platelet count on file in past 26 months     Recent Labs   Lab Test 11/05/20  0901                  Passed - Medication is active on med list        Passed - No active pregnancy on record        Passed - No positive pregnancy test in last 12 months           topiramate (TOPAMAX) 100 MG tablet [Pharmacy Med Name: TOPIRAMATE 100MG TABLETS] 30 tablet 0     Sig: TAKE ONE TABLET BY MOUTH EVERY NIGHT AT BEDTIME WITH 50MG TABLET       Anti-Seizure Meds Protocol  Failed - 9/3/2021  3:35 AM        Failed - Review Authorizing provider's last note.      Refer to last progress notes: confirm request is for original authorizing provider (cannot be through other providers).          Passed - Recent (12 mo) or future (30 " "days) visit within the authorizing provider's specialty     Patient has had an office visit with the authorizing provider or a provider within the authorizing providers department within the previous 12 mos or has a future within next 30 days. See \"Patient Info\" tab in inbasket, or \"Choose Columns\" in Meds & Orders section of the refill encounter.              Passed - Normal CBC on file in past 26 months     Recent Labs   Lab Test 11/05/20 0901   WBC 10.0   RBC 5.08   HGB 13.8   HCT 42.0                    Passed - Normal ALT or AST on file in past 26 months     Recent Labs   Lab Test 11/05/20 0901   ALT 22     Recent Labs   Lab Test 11/05/20 0901   AST 17             Passed - Normal platelet count on file in past 26 months     Recent Labs   Lab Test 11/05/20 0901                  Passed - Medication is active on med list        Passed - No active pregnancy on record        Passed - No positive pregnancy test in last 12 months           Routing refill request to provider for review/approval because:  Chapis given x1 and patient did not follow up, please advise.  Will send pt a MyChart message about needing an apt.       Thanks,  IMANI Mccrary  [facility]           "

## 2021-09-10 ENCOUNTER — THERAPY VISIT (OUTPATIENT)
Dept: OCCUPATIONAL THERAPY | Facility: CLINIC | Age: 41
End: 2021-09-10
Payer: COMMERCIAL

## 2021-09-10 DIAGNOSIS — G56.01 CARPAL TUNNEL SYNDROME OF RIGHT WRIST: ICD-10-CM

## 2021-09-10 DIAGNOSIS — M25.531 RIGHT WRIST PAIN: ICD-10-CM

## 2021-09-10 PROCEDURE — 97110 THERAPEUTIC EXERCISES: CPT | Mod: GO | Performed by: OCCUPATIONAL THERAPIST

## 2021-09-10 PROCEDURE — 97760 ORTHOTIC MGMT&TRAING 1ST ENC: CPT | Mod: GO | Performed by: OCCUPATIONAL THERAPIST

## 2021-09-10 PROCEDURE — 97165 OT EVAL LOW COMPLEX 30 MIN: CPT | Mod: GO | Performed by: OCCUPATIONAL THERAPIST

## 2021-09-10 NOTE — PROGRESS NOTES
Hand Therapy Initial Evaluation  Current Date:  9/10/2021    Subjective:  Elizabeth Gil is a 41 year old R hand dominant female.    Diagnosis: R CTS  DOI:  9/3/21 MD Order Date    Patient reports symptoms of pain, stiffness/loss of motion, weakness/loss of strength, edema, numbness and tingling of the R hand which occurred due to unknown cause. Since onset symptoms are unchanged. Special tests:  none.  Previous treatment: OTC brace. General health as reported by patient is fair.  Pertinent medical history includes: High Blood Pressure.  Medical allergies: none.  Surgical history: none.  Medication history: High Blood Pressure.    Occupational Profile Information:  Current occupation is Janitorial Service  Currently working in normal job without restrictions  Job Tasks: Computer Work, Prolonged Standing, Pushing, Pulling, Repetitive Tasks  Prior functional level:  independent-shared household chores  Barriers include:none  Mobility: No difficulty  Transportation: drives  Leisure activities/hobbies: skate, 3 kids, travel    Objective:  Upper Extremity Functional Index Score:  SCORE:   Column Totals: /80: 33   (A lower score indicates greater disability.)    Pain Level (Scale 0-10):   9/10/2021   At Rest 0/10   With Use 7/10     Pain Description:  Date 9/10/2021   Location R long finger and ring finger, palmar finger tips   Pain Quality Numb, Shooting, Tingling and electric   Frequency intermittent     Pain is worst Daytime or nighttime   Exacerbated by  Use, repetitive tasks   Relieved by OTC Brace, cold   Progression Unchanged     Posture  Normal    Edema  Mild    Sensation  Decreased Median Nerve distribution per pt report    ROM  Pain Report: 0-10/10  Wrist 9/10/2021 9/10/2021   AROM (PROM) L R   Extension 77 74   Flexion 66 59   RD 5 19   UD 27 25     Special Tests  Pain Report:  0-10/10   9/10/2021   Median Nerve Compression at Pronator -   Carpal Compression Test--Durkan Test (30 sec) @20 sec   Mcgill Test  "for Lumbrical Incursion  (fist x 30 secs) @26 sec   Tinels at Carpal Tunnel +   Phalens - \"some pain in arm\"     Neural Tension Testing  MNT: Median Neurodynamic Test (based on MADYSON Alvarez's ULNT)   9/10/2021   0-5 Scale 1/5   Position:   0/5: Arm across abdomen in coronal plane  1/5: Depress shoulder, ER to neutral Abd shoulder to 45 degrees  2/5: ER shoulder to end range, keep elbow at 90 degrees  3/5: Extend elbow to 0 degrees  4/5: Fully supinate forearm  5/5: Extend wrist, fingers and thumb  Notes:    (+) indicates beyond grade level but less than skilled nursing to next level    (-) indicates over skilled nursing to level    S1  onset/change of patient's symptoms    S2 definite stop point based on patient's discomfort level    Strength   (Measured in pounds)  Pain Report: 0-10/10   9/10/2021 9/10/2021   Trials L R   1  2  3 46 46   Average 46 46     Lat Pinch 9/10/2021 9/10/2021   Trials L R   1  2  3 16 14   Average 16 14     3 Pt Pinch 9/10/2021 9/10/2021   Trials L R   1  2  3 12 12   Average 12 12     Assessment:  Patient presents with symptoms consistent with diagnosis of right wrist and hand Carpal Tunnel Syndrome, with conservative intervention.     Patient's limitations or Problem List includes:  Pain, Decreased ROM/motion, Increased edema, Weakness, Decreased  and Decreased pinch of the right wrist and hand which interferes with the patient's ability to perform Self Care Tasks (dressing, bathing, hygiene/toileting), Work Tasks, Sleep Patterns, Recreational Activities, Household Chores and Driving  as compared to previous level of function.    Rehab Potential:  Excellent - Return to full activity, no limitations    Patient will benefit from skilled Occupational Therapy to increase ROM,  strength and pinch strength and decrease pain and edema to return to previous activity level and resume normal daily tasks and to reach their rehab potential.    Barriers to Learning:  No barrier    Communication Issues:  " Patient appears to be able to clearly communicate and understand verbal and written communication and follow directions correctly.    Chart Review: Chart Review and Brief history including review of medical and/or therapy records relating to the presenting problem    Identified Performance Deficits: bathing/showering, toileting, dressing, hygiene and grooming, driving and community mobility, home establishment and management, meal preparation and cleanup, sleep and work    Assessment of Occupational Performance:  5 or more Performance Deficits    Clinical Decision Making (Complexity): Low complexity    Treatment Explanation:  The following has been discussed with the patient:  RX ordered/plan of care  Anticipated outcomes  Possible risks and side effects    Plan:  Frequency:  1 X week, once daily  Duration:  for 6 weeks    Treatment Plan:    Modalities:    US, Iontophoresis, Fluidotherapy and Paraffin   Therapeutic Exercise:    AROM, AAROM, PROM, Tendon Gliding, Blocking, Isotonics and Isometrics  Neuromuscular re-ed:   Nerve Gliding, Kinesiotaping and Isometrics  Manual Techniques:   Friction massage, Joint mobilization, Myofascial release and Manual edema mobilization  Orthotic Fabrication:    Static and Forearm based  Self Care:    Self Care Tasks, Ergonomic Considerations and Work Tasks    Discharge Plan:  Achieve all LTG.  Independent in home treatment program.  Reach maximal therapeutic benefit.7/10    Home Exercise Program:  Finger Active Range of Motion Tendon Glides Fist Series  EMR Notes  HEP - Sets 1  Reps 10 reps  Sessions per day 3-4  Notes Keep wrist straight Hold end position for 5 seconds  Finger Active Range of Motion FDS Flexor Tendon Gliding  EMR Notes  HEP - Sets 1  Reps 10 reps  Sessions per day 3-4  Notes Middle Joint Hold 5 seconds  Nerve Gliding Proximal Median  EMR Notes  HEP - Sets 1  Reps 10  Sessions per day 2  Notes Hold 5 sec Stop Video at 18 seconds    Next Visit:  Assess/Revise  Orthosis  Assess./Progress HEP  MFR Flexors  Median Nerve Glide

## 2021-10-10 ENCOUNTER — HEALTH MAINTENANCE LETTER (OUTPATIENT)
Age: 41
End: 2021-10-10

## 2021-10-16 ENCOUNTER — HOSPITAL ENCOUNTER (INPATIENT)
Facility: CLINIC | Age: 41
LOS: 1 days | Discharge: HOME | End: 2021-10-17
Attending: EMERGENCY MEDICINE | Admitting: OBSTETRICS & GYNECOLOGY
Payer: COMMERCIAL

## 2021-10-16 ENCOUNTER — OFFICE VISIT (OUTPATIENT)
Dept: URGENT CARE | Facility: URGENT CARE | Age: 41
End: 2021-10-16
Payer: COMMERCIAL

## 2021-10-16 ENCOUNTER — APPOINTMENT (OUTPATIENT)
Dept: ULTRASOUND IMAGING | Facility: CLINIC | Age: 41
End: 2021-10-16
Attending: EMERGENCY MEDICINE
Payer: COMMERCIAL

## 2021-10-16 VITALS
OXYGEN SATURATION: 100 % | DIASTOLIC BLOOD PRESSURE: 104 MMHG | SYSTOLIC BLOOD PRESSURE: 159 MMHG | TEMPERATURE: 99.2 F | WEIGHT: 187 LBS | HEART RATE: 83 BPM | BODY MASS INDEX: 33.34 KG/M2

## 2021-10-16 DIAGNOSIS — R10.32 ABDOMINAL PAIN, LEFT LOWER QUADRANT: ICD-10-CM

## 2021-10-16 DIAGNOSIS — R10.31 ABDOMINAL PAIN, RIGHT LOWER QUADRANT: ICD-10-CM

## 2021-10-16 DIAGNOSIS — Z11.52 ENCOUNTER FOR SCREENING LABORATORY TESTING FOR SEVERE ACUTE RESPIRATORY SYNDROME CORONAVIRUS 2 (SARS-COV-2): ICD-10-CM

## 2021-10-16 DIAGNOSIS — N94.89 ADNEXAL MASS: ICD-10-CM

## 2021-10-16 DIAGNOSIS — Z53.9 DIAGNOSIS NOT YET DEFINED: Primary | ICD-10-CM

## 2021-10-16 LAB
ALBUMIN SERPL-MCNC: 3.5 G/DL (ref 3.4–5)
ALBUMIN UR-MCNC: NEGATIVE MG/DL
ALP SERPL-CCNC: 65 U/L (ref 40–150)
ALT SERPL W P-5'-P-CCNC: 25 U/L (ref 0–50)
ANION GAP SERPL CALCULATED.3IONS-SCNC: 7 MMOL/L (ref 3–14)
APPEARANCE UR: ABNORMAL
AST SERPL W P-5'-P-CCNC: 12 U/L (ref 0–45)
BASOPHILS # BLD MANUAL: 0 10E3/UL (ref 0–0.2)
BASOPHILS NFR BLD MANUAL: 0 %
BILIRUB SERPL-MCNC: 0.5 MG/DL (ref 0.2–1.3)
BILIRUB UR QL STRIP: NEGATIVE
BUN SERPL-MCNC: 9 MG/DL (ref 7–30)
BURR CELLS BLD QL SMEAR: SLIGHT
CALCIUM SERPL-MCNC: 9.1 MG/DL (ref 8.5–10.1)
CHLORIDE BLD-SCNC: 106 MMOL/L (ref 94–109)
CLUE CELLS: ABNORMAL
CO2 SERPL-SCNC: 25 MMOL/L (ref 20–32)
COLOR UR AUTO: ABNORMAL
CREAT SERPL-MCNC: 0.65 MG/DL (ref 0.52–1.04)
EOSINOPHIL # BLD MANUAL: 0 10E3/UL (ref 0–0.7)
EOSINOPHIL NFR BLD MANUAL: 0 %
ERYTHROCYTE [DISTWIDTH] IN BLOOD BY AUTOMATED COUNT: 16 % (ref 10–15)
GFR SERPL CREATININE-BSD FRML MDRD: >90 ML/MIN/1.73M2
GLUCOSE BLD-MCNC: 100 MG/DL (ref 70–99)
GLUCOSE UR STRIP-MCNC: NEGATIVE MG/DL
HCG SERPL QL: NEGATIVE
HCT VFR BLD AUTO: 39 % (ref 35–47)
HGB BLD-MCNC: 12.7 G/DL (ref 11.7–15.7)
HGB UR QL STRIP: NEGATIVE
HOLD SPECIMEN: NORMAL
HOLD SPECIMEN: NORMAL
KETONES UR STRIP-MCNC: NEGATIVE MG/DL
LEUKOCYTE ESTERASE UR QL STRIP: NEGATIVE
LIPASE SERPL-CCNC: 84 U/L (ref 73–393)
LYMPHOCYTES # BLD MANUAL: 4 10E3/UL (ref 0.8–5.3)
LYMPHOCYTES NFR BLD MANUAL: 29 %
MCH RBC QN AUTO: 26.9 PG (ref 26.5–33)
MCHC RBC AUTO-ENTMCNC: 32.6 G/DL (ref 31.5–36.5)
MCV RBC AUTO: 83 FL (ref 78–100)
MONOCYTES # BLD MANUAL: 0.8 10E3/UL (ref 0–1.3)
MONOCYTES NFR BLD MANUAL: 6 %
MUCOUS THREADS #/AREA URNS LPF: PRESENT /LPF
NEUTROPHILS # BLD MANUAL: 9 10E3/UL (ref 1.6–8.3)
NEUTROPHILS NFR BLD MANUAL: 65 %
NITRATE UR QL: NEGATIVE
NRBC # BLD AUTO: 0.8 10E3/UL
NRBC BLD MANUAL-RTO: 6 %
PH UR STRIP: 5 [PH] (ref 5–7)
PLAT MORPH BLD: ABNORMAL
PLATELET # BLD AUTO: 298 10E3/UL (ref 150–450)
POTASSIUM BLD-SCNC: 3.8 MMOL/L (ref 3.4–5.3)
PROT SERPL-MCNC: 7.5 G/DL (ref 6.8–8.8)
RBC # BLD AUTO: 4.72 10E6/UL (ref 3.8–5.2)
RBC MORPH BLD: ABNORMAL
RBC URINE: 2 /HPF
SARS-COV-2 RNA RESP QL NAA+PROBE: NEGATIVE
SODIUM SERPL-SCNC: 138 MMOL/L (ref 133–144)
SP GR UR STRIP: 1.02 (ref 1–1.03)
SQUAMOUS EPITHELIAL: 1 /HPF
TRICHOMONAS, WET PREP: ABNORMAL
UROBILINOGEN UR STRIP-MCNC: NORMAL MG/DL
WBC # BLD AUTO: 13.9 10E3/UL (ref 4–11)
WBC URINE: 1 /HPF
WBC'S/HIGH POWER FIELD, WET PREP: ABNORMAL
YEAST, WET PREP: ABNORMAL

## 2021-10-16 PROCEDURE — 81001 URINALYSIS AUTO W/SCOPE: CPT | Performed by: EMERGENCY MEDICINE

## 2021-10-16 PROCEDURE — 99285 EMERGENCY DEPT VISIT HI MDM: CPT | Mod: 25 | Performed by: EMERGENCY MEDICINE

## 2021-10-16 PROCEDURE — 84155 ASSAY OF PROTEIN SERUM: CPT | Performed by: EMERGENCY MEDICINE

## 2021-10-16 PROCEDURE — 76830 TRANSVAGINAL US NON-OB: CPT | Mod: 26 | Performed by: RADIOLOGY

## 2021-10-16 PROCEDURE — 96374 THER/PROPH/DIAG INJ IV PUSH: CPT | Performed by: EMERGENCY MEDICINE

## 2021-10-16 PROCEDURE — 84450 TRANSFERASE (AST) (SGOT): CPT | Performed by: EMERGENCY MEDICINE

## 2021-10-16 PROCEDURE — 87591 N.GONORRHOEAE DNA AMP PROB: CPT | Performed by: STUDENT IN AN ORGANIZED HEALTH CARE EDUCATION/TRAINING PROGRAM

## 2021-10-16 PROCEDURE — 85027 COMPLETE CBC AUTOMATED: CPT | Performed by: EMERGENCY MEDICINE

## 2021-10-16 PROCEDURE — 87210 SMEAR WET MOUNT SALINE/INK: CPT | Performed by: STUDENT IN AN ORGANIZED HEALTH CARE EDUCATION/TRAINING PROGRAM

## 2021-10-16 PROCEDURE — 36415 COLL VENOUS BLD VENIPUNCTURE: CPT | Performed by: EMERGENCY MEDICINE

## 2021-10-16 PROCEDURE — 250N000013 HC RX MED GY IP 250 OP 250 PS 637: Performed by: EMERGENCY MEDICINE

## 2021-10-16 PROCEDURE — 250N000011 HC RX IP 250 OP 636: Performed by: EMERGENCY MEDICINE

## 2021-10-16 PROCEDURE — 76856 US EXAM PELVIC COMPLETE: CPT | Mod: 26 | Performed by: RADIOLOGY

## 2021-10-16 PROCEDURE — C9803 HOPD COVID-19 SPEC COLLECT: HCPCS | Performed by: EMERGENCY MEDICINE

## 2021-10-16 PROCEDURE — 87491 CHLMYD TRACH DNA AMP PROBE: CPT | Performed by: STUDENT IN AN ORGANIZED HEALTH CARE EDUCATION/TRAINING PROGRAM

## 2021-10-16 PROCEDURE — 120N000002 HC R&B MED SURG/OB UMMC

## 2021-10-16 PROCEDURE — 84703 CHORIONIC GONADOTROPIN ASSAY: CPT | Performed by: EMERGENCY MEDICINE

## 2021-10-16 PROCEDURE — U0003 INFECTIOUS AGENT DETECTION BY NUCLEIC ACID (DNA OR RNA); SEVERE ACUTE RESPIRATORY SYNDROME CORONAVIRUS 2 (SARS-COV-2) (CORONAVIRUS DISEASE [COVID-19]), AMPLIFIED PROBE TECHNIQUE, MAKING USE OF HIGH THROUGHPUT TECHNOLOGIES AS DESCRIBED BY CMS-2020-01-R: HCPCS | Performed by: EMERGENCY MEDICINE

## 2021-10-16 PROCEDURE — 83690 ASSAY OF LIPASE: CPT | Performed by: EMERGENCY MEDICINE

## 2021-10-16 PROCEDURE — 76830 TRANSVAGINAL US NON-OB: CPT

## 2021-10-16 PROCEDURE — 99284 EMERGENCY DEPT VISIT MOD MDM: CPT | Performed by: EMERGENCY MEDICINE

## 2021-10-16 RX ORDER — MORPHINE SULFATE 4 MG/ML
4 INJECTION, SOLUTION INTRAMUSCULAR; INTRAVENOUS
Status: COMPLETED | OUTPATIENT
Start: 2021-10-16 | End: 2021-10-16

## 2021-10-16 RX ORDER — ACETAMINOPHEN 325 MG/1
975 TABLET ORAL ONCE
Status: COMPLETED | OUTPATIENT
Start: 2021-10-16 | End: 2021-10-16

## 2021-10-16 RX ORDER — HYDROCHLOROTHIAZIDE 25 MG/1
25 TABLET ORAL ONCE
Status: COMPLETED | OUTPATIENT
Start: 2021-10-16 | End: 2021-10-16

## 2021-10-16 RX ADMIN — ACETAMINOPHEN 975 MG: 325 TABLET, FILM COATED ORAL at 18:00

## 2021-10-16 RX ADMIN — HYDROCHLOROTHIAZIDE 25 MG: 25 TABLET ORAL at 22:07

## 2021-10-16 RX ADMIN — MORPHINE SULFATE 4 MG: 4 INJECTION INTRAVENOUS at 20:15

## 2021-10-16 ASSESSMENT — MIFFLIN-ST. JEOR: SCORE: 1492.36

## 2021-10-16 NOTE — PROGRESS NOTES
Patient not seen, wanted to have imaging that is not available in UC for abdominal pain.    BP (!) 159/104   Pulse 83   Temp 99.2  F (37.3  C) (Tympanic)   Wt 84.8 kg (187 lb)   LMP 09/20/2021 (Approximate)   SpO2 100%   BMI 33.34 kg/m        Jerson Mayes MD  October 16, 2021 3:33 PM

## 2021-10-16 NOTE — ED TRIAGE NOTES
Pt presents with lower abd and genital pain and pressure. Pt reports positional 'pressure' that shifts around in her lower abd and she is unable to void .

## 2021-10-16 NOTE — ED PROVIDER NOTES
"ED Provider Note  [facility]      History     Chief Complaint   Patient presents with     Muscle Pain     Spasm/Urinary retention     HPI  Elizabeth Gil is a 41 year old female with a history of PASTORA III, stress incontinence, migraines, elevated BMI, hypertension who presents for evaluation of Muscle Pain (Spasm/Urinary retention)    Patient presents with her fiancé for evaluation of lower abdominal pain that started suddenly around 2 AM this morning.  Right lower quadrant worse than left lower quadrant.  Exacerbated by certain position changes.  No alleviating factors.  She had associated diaphoresis when she was extremely uncomfortable but no recent fevers, chills, chest pain, shortness of breath, nausea, vomiting. Feels like she has had to strain more in order to urinate but no dysuria, hematuria. No abnormal vaginal discharge, perineal lesions or pain.  She said she had a similar episode in 2009 which was attributed to constipation.  Her last bowel movement was yesterday and was normal. History of prior \"tummy tuck\" , no other intra-abdominal surgeries. Last LMP 9/21, normal by report.    Past Medical History  Past Medical History:   Diagnosis Date     Abnormal Pap smear of cervix 10/18/2018    10/18/18: See problem list.      Cervical high risk HPV (human papillomavirus) test positive 10/18/2018, 6/12/19    See problem list     Hypertension      Migraine      History reviewed. No pertinent surgical history.  ammonium lactate (AMLACTIN) 12 % external cream  Emollient (AQUAPHOR ADVANCED THERAPY) OINT  fluocinonide (LIDEX) 0.05 % external ointment  hydrochlorothiazide (HYDRODIURIL) 25 MG tablet  ibuprofen (ADVIL/MOTRIN) 600 MG tablet  metFORMIN (GLUCOPHAGE) 500 MG tablet  naltrexone (DEPADE/REVIA) 50 MG tablet  naproxen (NAPROSYN) 500 MG tablet  nicotine (NICODERM CQ) 21 MG/24HR 24 hr patch  nicotine (NICORETTE) 2 MG gum  order for DME  phentermine (ADIPEX-P) 37.5 MG " "tablet  tolterodine ER (DETROL LA) 4 MG 24 hr capsule  topiramate (TOPAMAX) 100 MG tablet  topiramate (TOPAMAX) 50 MG tablet  triamcinolone (ARISTOCORT HP) 0.5 % external cream  vitamin D3 (CHOLECALCIFEROL) 2000 units (50 mcg) tablet      No Known Allergies  Family History  Family History   Problem Relation Age of Onset     Thyroid Disease Mother      Heart Disease Mother      Hypertension Mother      Obesity Mother      Aneurysm Father      Hypertension Father      Obesity Father      Social History   Social History     Tobacco Use     Smoking status: Former Smoker     Packs/day: 1.00     Quit date: 2020     Years since quittin.3     Smokeless tobacco: Never Used   Substance Use Topics     Alcohol use: No     Comment: 2-3     Drug use: No      Past medical history, past surgical history, medications, allergies, family history, and social history were reviewed with the patient. No additional pertinent items.       Review of Systems  A complete review of systems was performed with pertinent positives and negatives noted in the HPI, and all other systems negative.    Physical Exam   BP: (!) 160/106  Pulse: 83  Temp: 98.8  F (37.1  C)  Resp: 12  Height: 160 cm (5' 3\")  Weight: 84.8 kg (187 lb)  SpO2: 100 %  Physical Exam  Constitutional:       General: She is not in acute distress.  HENT:      Head: Normocephalic.      Right Ear: External ear normal.      Left Ear: External ear normal.   Cardiovascular:      Rate and Rhythm: Normal rate and regular rhythm.   Pulmonary:      Effort: Pulmonary effort is normal.      Breath sounds: Normal breath sounds.   Abdominal:      Tenderness: There is abdominal tenderness (RLQ > LLQ). There is no right CVA tenderness, left CVA tenderness or guarding. Negative signs include Tamez's sign, Rovsing's sign, psoas sign and obturator sign.   Musculoskeletal:      Cervical back: Normal range of motion.   Neurological:      Mental Status: She is alert. "         Diagnostics/Procedures   Procedures          Results for orders placed or performed during the hospital encounter of 10/16/21   US Pelvis Cmplt w Transvag & Doppler LmtPel Duplex Limited     Status: None    Narrative    EXAMINATION: US PELVIS COMPLETE W TRANSVAGINAL AND DOPPLER LIMITED,  10/16/2021 7:06 PM     COMPARISON: None.    HISTORY: RLQ tenderness. concern for ovarian torsion    TECHNIQUE: The pelvis was scanned in standard fashion with  transabdominal and transvaginal transducer(s) using both grey scale  and limited color Doppler techniques.    FINDINGS:  The uterus measures 9.9 x 4.2 x 4.1 cm and demonstrates normal  homogeneous echotexture. The endometrial stripe measures 10 mm in  thickness. Small amount of free fluid in the pelvis.    Right ovary measures 3.8 x 1.8 x 1.6 cm and demonstrates normal  follicular architecture. Normal low resistance arterial waveform.    Complex left adnexal lesion measuring approximately 9.0 x 5.4 x 4.2  cm. Centrally within the lesion, there are cystic structures which  appear to represent normal ovarian follicular architecture. There is a  normal low resistance arterial waveform to the left adnexa; however,  this can be present in a torsed ovary as the ovary has 2 arterial  supplies.       Impression    IMPRESSION:   1. Complex left adnexal lesion measuring approximately 9.0 x 4.4 x 4.2  cm. Differential includes torsed left ovary, tubo-ovarian abscess,  ruptured ectopic pregnancy, ovarian mass. Recommend OB/GYN  consultation.   2. Normal appearance of the right ovary, without evidence of ovarian  torsion.     I have personally reviewed the examination and initial interpretation  and I agree with the findings.    BECCA PARIS MD         SYSTEM ID:  [id number]   Comprehensive metabolic panel     Status: Abnormal   Result Value Ref Range    Sodium 138 133 - 144 mmol/L    Potassium 3.8 3.4 - 5.3 mmol/L    Chloride 106 94 - 109 mmol/L    Carbon Dioxide (CO2) 25 20  - 32 mmol/L    Anion Gap 7 3 - 14 mmol/L    Urea Nitrogen 9 7 - 30 mg/dL    Creatinine 0.65 0.52 - 1.04 mg/dL    Calcium 9.1 8.5 - 10.1 mg/dL    Glucose 100 (H) 70 - 99 mg/dL    Alkaline Phosphatase 65 40 - 150 U/L    AST 12 0 - 45 U/L    ALT 25 0 - 50 U/L    Protein Total 7.5 6.8 - 8.8 g/dL    Albumin 3.5 3.4 - 5.0 g/dL    Bilirubin Total 0.5 0.2 - 1.3 mg/dL    GFR Estimate >90 >60 mL/min/1.73m2   Lipase     Status: Normal   Result Value Ref Range    Lipase 84 73 - 393 U/L   UA with Microscopic reflex to Culture     Status: Abnormal    Specimen: Urine, Midstream   Result Value Ref Range    Color Urine Light Yellow Colorless, Straw, Light Yellow, Yellow    Appearance Urine Slightly Cloudy (A) Clear    Glucose Urine Negative Negative mg/dL    Bilirubin Urine Negative Negative    Ketones Urine Negative Negative mg/dL    Specific Gravity Urine 1.019 1.003 - 1.035    Blood Urine Negative Negative    pH Urine 5.0 5.0 - 7.0    Protein Albumin Urine Negative Negative mg/dL    Urobilinogen Urine Normal Normal, 2.0 mg/dL    Nitrite Urine Negative Negative    Leukocyte Esterase Urine Negative Negative    Mucus Urine Present (A) None Seen /LPF    RBC Urine 2 <=2 /HPF    WBC Urine 1 <=5 /HPF    Squamous Epithelials Urine 1 <=1 /HPF    Narrative    Urine Culture not indicated   CBC with platelets and differential     Status: Abnormal   Result Value Ref Range    WBC Count 13.9 (H) 4.0 - 11.0 10e3/uL    RBC Count 4.72 3.80 - 5.20 10e6/uL    Hemoglobin 12.7 11.7 - 15.7 g/dL    Hematocrit 39.0 35.0 - 47.0 %    MCV 83 78 - 100 fL    MCH 26.9 26.5 - 33.0 pg    MCHC 32.6 31.5 - 36.5 g/dL    RDW 16.0 (H) 10.0 - 15.0 %    Platelet Count 298 150 - 450 10e3/uL   Extra Blue Top Tube     Status: None   Result Value Ref Range    Hold Specimen JIC    Extra Red Top Tube     Status: None   Result Value Ref Range    Hold Specimen JIC    HCG qualitative pregnancy (blood)     Status: Normal   Result Value Ref Range    hCG Serum Qualitative Negative  Negative   Manual Differential     Status: Abnormal   Result Value Ref Range    % Neutrophils 65 %    % Lymphocytes 29 %    % Monocytes 6 %    % Eosinophils 0 %    % Basophils 0 %    NRBCs per 100 WBC 6 (H) <=0 %    Absolute Neutrophils 9.0 (H) 1.6 - 8.3 10e3/uL    Absolute Lymphocytes 4.0 0.8 - 5.3 10e3/uL    Absolute Monocytes 0.8 0.0 - 1.3 10e3/uL    Absolute Eosinophils 0.0 0.0 - 0.7 10e3/uL    Absolute Basophils 0.0 0.0 - 0.2 10e3/uL    Absolute NRBCs 0.8 (H) <=0.0 10e3/uL    RBC Morphology Confirmed RBC Indices     Platelet Assessment  Automated Count Confirmed. Platelet morphology is normal.     Automated Count Confirmed. Platelet morphology is normal.    Christine Cells Slight (A) None Seen   Asymptomatic COVID-19 Virus (Coronavirus) by PCR Nasopharyngeal     Status: Normal    Specimen: Nasopharyngeal; Swab   Result Value Ref Range    SARS CoV2 PCR Negative Negative    Narrative    Testing was performed using the Xpert Xpress SARS-CoV-2 Assay on the  Cepheid Gene-Xpert Instrument Systems. Additional information about  this Emergency Use Authorization (EUA) assay can be found via the Lab  Guide. This test should be ordered for the detection of SARS-CoV-2 in  individuals who meet SARS-CoV-2 clinical and/or epidemiological  criteria. Test performance is unknown in asymptomatic patients. This  test is for in vitro diagnostic use under the FDA EUA for  laboratories certified under CLIA to perform high complexity testing.  This test has not been FDA cleared or approved. A negative result  does not rule out the presence of PCR inhibitors in the specimen or  target RNA in concentration below the limit of detection for the  assay. The possibility of a false negative should be considered if  the patient's recent exposure or clinical presentation suggests  COVID-19. This test was validated by the [facility]. This laboratory is certified under  the Clinical Laboratory  Improvement Amendments of 1988 (CLIA-88) as  qualified to perform high complexity laboratory testing.     Wet prep     Status: Abnormal    Specimen: Vagina; Swab   Result Value Ref Range    Trichomonas Absent Absent    Yeast Absent Absent    Clue Cells Absent Absent    WBCs/high power field 1+ (A) None   CBC with platelets differential     Status: Abnormal    Narrative    The following orders were created for panel order CBC with platelets differential.  Procedure                               Abnormality         Status                     ---------                               -----------         ------                     CBC with platelets and d...[[id number]]  Abnormal            Final result               Manual Differential[[id number]]          Abnormal            Final result                 Please view results for these tests on the individual orders.   Patrick Draw     Status: None    Narrative    The following orders were created for panel order Patrick Draw.  Procedure                               Abnormality         Status                     ---------                               -----------         ------                     Extra Blue Top Tube[[id number]]                              Final result               Extra Red Top Tube[[id number]]                               Final result                 Please view results for these tests on the individual orders.     Medications   acetaminophen (TYLENOL) tablet 975 mg (975 mg Oral Given 10/16/21 1800)   morphine (PF) injection 4 mg (4 mg Intravenous Given 10/16/21 2015)   hydrochlorothiazide (HYDRODIURIL) tablet 25 mg (25 mg Oral Given 10/16/21 2207)        Assessments & Plan (with Medical Decision Making)   Elizabeth Gil is a 41 year old female presenting with lower abdominal pain and increased urinary straining. Vital signs are within normal limits.     Differential includes but is not limited to ovarian torsion, hemorrhagic ovarian cyst, ectopic  pregnancy, UTI/pyelonephritis, nephrolithiasis, appendicitis.     Plan - labs and imaging. Given the time sensitive nature of diagnosis of ovarian torsion, we will start with a pelvic ultrasound but she may require additional imaging if etiology remains unclear following the ultrasound. PRN analgesics.       ED Course as of Oct 17 0106   Sat Oct 16, 2021   1739 Leukocytosis    CBC with platelets differential(!)   1739 Not consistent with infection. No hematuria which may make nephrolithiasis less likely.    UA with Microscopic reflex to Culture(!)   1912 Comprehensive metabolic panel(!)   1913 Lipase   1913 HCG qualitative pregnancy (blood)   1952 Complex left adnexal lesion measuring approximately 9.0 x 4.4 x 4.2  cm. Recommend correlation with outside imaging if available. Otherwise  MRI of the pelvis is recommended for further characterization.  2. Normal appearance of the right ovary, without evidence of ovarian  torsion.   US Pelvis Cmplt w Transvag & Doppler LmtPel Duplex Limited   1958 Updated on results and next steps in diagnostics.       2015 Spoke to radiology. Given that she does have left sided abdominal tenderness as well TOA and torsion remain in the differential. Will consult gynecology       2024 Discussed with Gyn. Will come to evaluate.       2140 Spoke to gynecology. [place] [facility] [place] exams.           New Prescriptions    No medications on file       Final diagnoses:   Abdominal pain, left lower quadrant   Abdominal pain, right lower quadrant   Adnexal mass          Kailyn Lopez MD  10/17/21 0107

## 2021-10-17 ENCOUNTER — TELEPHONE (OUTPATIENT)
Dept: OBGYN | Facility: CLINIC | Age: 41
End: 2021-10-17

## 2021-10-17 VITALS
SYSTOLIC BLOOD PRESSURE: 125 MMHG | WEIGHT: 187 LBS | HEART RATE: 67 BPM | TEMPERATURE: 97.3 F | RESPIRATION RATE: 16 BRPM | OXYGEN SATURATION: 99 % | BODY MASS INDEX: 33.13 KG/M2 | HEIGHT: 63 IN | DIASTOLIC BLOOD PRESSURE: 61 MMHG

## 2021-10-17 LAB
C TRACH DNA SPEC QL NAA+PROBE: NEGATIVE
N GONORRHOEA DNA SPEC QL NAA+PROBE: NEGATIVE

## 2021-10-17 PROCEDURE — 250N000013 HC RX MED GY IP 250 OP 250 PS 637: Performed by: STUDENT IN AN ORGANIZED HEALTH CARE EDUCATION/TRAINING PROGRAM

## 2021-10-17 PROCEDURE — 120N000002 HC R&B MED SURG/OB UMMC

## 2021-10-17 PROCEDURE — 258N000003 HC RX IP 258 OP 636: Performed by: STUDENT IN AN ORGANIZED HEALTH CARE EDUCATION/TRAINING PROGRAM

## 2021-10-17 PROCEDURE — 258N000003 HC RX IP 258 OP 636

## 2021-10-17 PROCEDURE — 99238 HOSP IP/OBS DSCHRG MGMT 30/<: CPT | Mod: GC | Performed by: OBSTETRICS & GYNECOLOGY

## 2021-10-17 RX ORDER — PROCHLORPERAZINE MALEATE 5 MG
10 TABLET ORAL EVERY 6 HOURS PRN
Status: DISCONTINUED | OUTPATIENT
Start: 2021-10-17 | End: 2021-10-17 | Stop reason: HOSPADM

## 2021-10-17 RX ORDER — AMOXICILLIN 250 MG
2 CAPSULE ORAL 2 TIMES DAILY PRN
Qty: 60 TABLET | Refills: 1 | Status: SHIPPED | OUTPATIENT
Start: 2021-10-17

## 2021-10-17 RX ORDER — TOPIRAMATE 25 MG/1
50 TABLET, FILM COATED ORAL AT BEDTIME
Status: DISCONTINUED | OUTPATIENT
Start: 2021-10-17 | End: 2021-10-17 | Stop reason: HOSPADM

## 2021-10-17 RX ORDER — NALOXONE HYDROCHLORIDE 0.4 MG/ML
0.4 INJECTION, SOLUTION INTRAMUSCULAR; INTRAVENOUS; SUBCUTANEOUS
Status: DISCONTINUED | OUTPATIENT
Start: 2021-10-17 | End: 2021-10-17 | Stop reason: HOSPADM

## 2021-10-17 RX ORDER — HYDROCHLOROTHIAZIDE 25 MG/1
25 TABLET ORAL DAILY
Status: DISCONTINUED | OUTPATIENT
Start: 2021-10-17 | End: 2021-10-17 | Stop reason: HOSPADM

## 2021-10-17 RX ORDER — LIDOCAINE 40 MG/G
CREAM TOPICAL
Status: DISCONTINUED | OUTPATIENT
Start: 2021-10-17 | End: 2021-10-17 | Stop reason: HOSPADM

## 2021-10-17 RX ORDER — ONDANSETRON 4 MG/1
4 TABLET, ORALLY DISINTEGRATING ORAL EVERY 6 HOURS PRN
Status: DISCONTINUED | OUTPATIENT
Start: 2021-10-17 | End: 2021-10-17 | Stop reason: HOSPADM

## 2021-10-17 RX ORDER — IBUPROFEN 600 MG/1
600 TABLET, FILM COATED ORAL EVERY 6 HOURS PRN
Status: DISCONTINUED | OUTPATIENT
Start: 2021-10-17 | End: 2021-10-17 | Stop reason: HOSPADM

## 2021-10-17 RX ORDER — NALOXONE HYDROCHLORIDE 0.4 MG/ML
0.2 INJECTION, SOLUTION INTRAMUSCULAR; INTRAVENOUS; SUBCUTANEOUS
Status: DISCONTINUED | OUTPATIENT
Start: 2021-10-17 | End: 2021-10-17 | Stop reason: HOSPADM

## 2021-10-17 RX ORDER — SODIUM CHLORIDE 9 MG/ML
INJECTION, SOLUTION INTRAVENOUS
Status: DISCONTINUED
Start: 2021-10-17 | End: 2021-10-17 | Stop reason: HOSPADM

## 2021-10-17 RX ORDER — ONDANSETRON 2 MG/ML
4 INJECTION INTRAMUSCULAR; INTRAVENOUS EVERY 6 HOURS PRN
Status: DISCONTINUED | OUTPATIENT
Start: 2021-10-17 | End: 2021-10-17 | Stop reason: HOSPADM

## 2021-10-17 RX ORDER — SODIUM CHLORIDE 9 MG/ML
INJECTION, SOLUTION INTRAVENOUS CONTINUOUS
Status: DISCONTINUED | OUTPATIENT
Start: 2021-10-17 | End: 2021-10-17 | Stop reason: HOSPADM

## 2021-10-17 RX ORDER — AMOXICILLIN 250 MG
2 CAPSULE ORAL 2 TIMES DAILY PRN
Status: DISCONTINUED | OUTPATIENT
Start: 2021-10-17 | End: 2021-10-17 | Stop reason: HOSPADM

## 2021-10-17 RX ORDER — PROCHLORPERAZINE 25 MG
25 SUPPOSITORY, RECTAL RECTAL EVERY 12 HOURS PRN
Status: DISCONTINUED | OUTPATIENT
Start: 2021-10-17 | End: 2021-10-17 | Stop reason: HOSPADM

## 2021-10-17 RX ORDER — OXYCODONE HYDROCHLORIDE 5 MG/1
5 TABLET ORAL EVERY 4 HOURS PRN
Status: DISCONTINUED | OUTPATIENT
Start: 2021-10-17 | End: 2021-10-17 | Stop reason: HOSPADM

## 2021-10-17 RX ORDER — TOPIRAMATE 100 MG/1
100 TABLET, FILM COATED ORAL AT BEDTIME
Status: DISCONTINUED | OUTPATIENT
Start: 2021-10-17 | End: 2021-10-17 | Stop reason: HOSPADM

## 2021-10-17 RX ORDER — AMOXICILLIN 250 MG
1 CAPSULE ORAL 2 TIMES DAILY PRN
Status: DISCONTINUED | OUTPATIENT
Start: 2021-10-17 | End: 2021-10-17 | Stop reason: HOSPADM

## 2021-10-17 RX ORDER — ACETAMINOPHEN 325 MG/1
975 TABLET ORAL EVERY 8 HOURS
Status: DISCONTINUED | OUTPATIENT
Start: 2021-10-17 | End: 2021-10-17 | Stop reason: HOSPADM

## 2021-10-17 RX ADMIN — SODIUM CHLORIDE: 9 INJECTION, SOLUTION INTRAVENOUS at 01:56

## 2021-10-17 RX ADMIN — TOPIRAMATE 100 MG: 100 TABLET, FILM COATED ORAL at 01:51

## 2021-10-17 RX ADMIN — ACETAMINOPHEN 975 MG: 325 TABLET, FILM COATED ORAL at 01:50

## 2021-10-17 RX ADMIN — TOPIRAMATE 50 MG: 25 TABLET, FILM COATED ORAL at 01:50

## 2021-10-17 RX ADMIN — ACETAMINOPHEN 975 MG: 325 TABLET, FILM COATED ORAL at 09:43

## 2021-10-17 RX ADMIN — HYDROCHLOROTHIAZIDE 25 MG: 25 TABLET ORAL at 08:10

## 2021-10-17 NOTE — PLAN OF CARE
"  VS: /61 (BP Location: Left arm)   Pulse 67   Temp 97.3  F (36.3  C) (Oral)   Resp 16   Ht 1.6 m (5' 3\")   Wt 84.8 kg (187 lb)   LMP 09/20/2021 (Approximate)   SpO2 99%   BMI 33.13 kg/m     O2: Stable on RA.   Output: Void spontaneously in the toilet.   Last BM: 10/14, senna ordered upon discharge.   Activity: Independent in room.   Skin: Intact. Tattoo.   Pain: Tylenol, given per order.   CMS: Intact. Denies numbness and tingling.    Dressing: None.   Diet: Regular diet.   LDA: Pulled pout for discharge.   Equipment: Call light with in reach.   Plan: Home today.   Additional Info:    DISCHARGE SUMMARY    Pt discharging to: home  Transportation: family provided.  AVS given and discussed: yes.  Medications given:  order sent to the patient home pharmacy.  Belongings returned: yes.  Comments:      "

## 2021-10-17 NOTE — TELEPHONE ENCOUNTER
Elizabeth Gil is a 41 year old  who presented to the ED and was admitted 10/16 for severe LLQ abdominal pain, found to have a left adnexal mass. Pain improved and she was discharged to home 10/17/2021. She needs to be a scheduled for a CT scan and appointment with an OBGYN in our clinic after the CT scan in 1-2 weeks. Order for CT scan is in. Can you help her get scheduled for these?  Thanks,  Екатерина Temple MD

## 2021-10-17 NOTE — PLAN OF CARE
Pt arrived from  ED at 0115. Report taken from [facility]. A&Ox4. VSS. Afebrile. Lung sounds CTA. Maintaining sats on RA. Bowel sounds active, LBM 10/14. CMS and neuro's are intact. Reports baseline numbness in bilateral hands. Denies nausea, shortness of breath, and chest pain. Reports abdominal pain, middle, R upper and L upper. Denied prn pain medications, took scheduled Tylenol. Voided in the BR. NPO until assessment in AM. Skin appears intact. Pt able to move independently. PIV is patent and infusing NS in R arm 100mL/hr. Call light is within reach, pt able to make needs known and is resting comfortably between cares. Will continue to monitor.

## 2021-10-17 NOTE — H&P
"Gynecology History and Physical    HPI: Elizabeth Gil is a 41 year old  presenting with abdominal pain since 0200 this morning. Pain is all over her abdomen, but worst on the right side of her abdomen. Patient just had morphine and pain is improved now. Has had some nausea today but no emesis. Denies any vaginal bleeding, abnormal discharge, burning itching in vulvar region. Of note, patient has not had a BM in 2-3 days, usually has very regular BM 2-3x/day. She had pain similar to this in  which was constipation. She denies any pain with urination, however does feel that it \"feel weird\" when she urinates with increased pressure below. Denies chest pain, SOB, fevers, chills. No concern for STI's at this time but would like to be tested for NG/CT.    OBHx:   - 3 term   - 2 miscarriages  - 3 elective terminations    GynHx: LMP 2021, 4-5d. Cycles are 28-30d, regular.  Sexual activity: yes   Last pap: H/o CIN3, last pap 2019 NILM, other HR HPV positive  STIs- remote history, s/p treatment    PMH:   - H/o CIN3  - HTN  - Migraines    Meds  - hydrochlorothiazide  - topamax for migraines    PSH:   - Abdominoplasty \"tummy tuck\"    Social Hx:   Social History     Tobacco Use     Smoking status: Former Smoker     Packs/day: 1.00     Quit date: 2020     Years since quittin.3     Smokeless tobacco: Never Used   Substance Use Topics     Alcohol use: No     Comment: 2-3     Drug use: No        Family History: family history includes Aneurysm in her father; Heart Disease in her mother; Hypertension in her father and mother; Obesity in her father and mother; Thyroid Disease in her mother.    ROS:   Negative except per HPI    Objective:   BP (!) 141/92   Pulse 88   Temp 98  F (36.7  C) (Oral)   Resp 16   Ht 1.6 m (5' 3\")   Wt 84.8 kg (187 lb)   LMP 2021 (Approximate)   SpO2 98%   BMI 33.13 kg/m    Constitutional: Healthy appearing female, no acute distress  Cardiovascular: " Well perfused, regular rate  Respiratory: Breathing comfortably on room air  Gastrointestinal: Abdomen soft, mildly tender to palpation in LLQ, moderately tender in central and right abdomen - most tender in RLQ. No rebound/guarding.    Pelvic Exam - : External genitalia normal well-estrogenized, healthy tissue.  No obvious excoriations, lesions, or rashes. Normal pink vaginal mucosa.  SSE: Normal cervix, normal physiologic discharge.   Bimanual: No CMT, Some adnexal fullness palpable on left, pressure with palpation on left adnexa, right adnexal tenderness.    Labs/Imaging:  Results for orders placed or performed during the hospital encounter of 10/16/21   US Pelvis Cmplt w Transvag & Doppler LmtPel Duplex Limited     Status: None    Narrative    EXAMINATION: US PELVIS COMPLETE W TRANSVAGINAL AND DOPPLER LIMITED,  10/16/2021 7:06 PM     COMPARISON: None.    HISTORY: RLQ tenderness. concern for ovarian torsion    TECHNIQUE: The pelvis was scanned in standard fashion with  transabdominal and transvaginal transducer(s) using both grey scale  and limited color Doppler techniques.    FINDINGS:  The uterus measures 9.9 x 4.2 x 4.1 cm and demonstrates normal  homogeneous echotexture. The endometrial stripe measures 10 mm in  thickness. Small amount of free fluid in the pelvis.    Right ovary measures 3.8 x 1.8 x 1.6 cm and demonstrates normal  follicular architecture. Normal low resistance arterial waveform.    Complex left adnexal lesion measuring approximately 9.0 x 5.4 x 4.2  cm. Centrally within the lesion, there are cystic structures which  appear to represent normal ovarian follicular architecture. There is a  normal low resistance arterial waveform to the left adnexa; however,  this can be present in a torsed ovary as the ovary has 2 arterial  supplies.       Impression    IMPRESSION:   1. Complex left adnexal lesion measuring approximately 9.0 x 4.4 x 4.2  cm. Differential includes torsed left ovary,  tubo-ovarian abscess,  ruptured ectopic pregnancy, ovarian mass. Recommend OB/GYN  consultation.   2. Normal appearance of the right ovary, without evidence of ovarian  torsion.     I have personally reviewed the examination and initial interpretation  and I agree with the findings.    BECCA PARIS MD         SYSTEM ID:  [id number]   Comprehensive metabolic panel     Status: Abnormal   Result Value Ref Range    Sodium 138 133 - 144 mmol/L    Potassium 3.8 3.4 - 5.3 mmol/L    Chloride 106 94 - 109 mmol/L    Carbon Dioxide (CO2) 25 20 - 32 mmol/L    Anion Gap 7 3 - 14 mmol/L    Urea Nitrogen 9 7 - 30 mg/dL    Creatinine 0.65 0.52 - 1.04 mg/dL    Calcium 9.1 8.5 - 10.1 mg/dL    Glucose 100 (H) 70 - 99 mg/dL    Alkaline Phosphatase 65 40 - 150 U/L    AST 12 0 - 45 U/L    ALT 25 0 - 50 U/L    Protein Total 7.5 6.8 - 8.8 g/dL    Albumin 3.5 3.4 - 5.0 g/dL    Bilirubin Total 0.5 0.2 - 1.3 mg/dL    GFR Estimate >90 >60 mL/min/1.73m2   Lipase     Status: Normal   Result Value Ref Range    Lipase 84 73 - 393 U/L   UA with Microscopic reflex to Culture     Status: Abnormal    Specimen: Urine, Midstream   Result Value Ref Range    Color Urine Light Yellow Colorless, Straw, Light Yellow, Yellow    Appearance Urine Slightly Cloudy (A) Clear    Glucose Urine Negative Negative mg/dL    Bilirubin Urine Negative Negative    Ketones Urine Negative Negative mg/dL    Specific Gravity Urine 1.019 1.003 - 1.035    Blood Urine Negative Negative    pH Urine 5.0 5.0 - 7.0    Protein Albumin Urine Negative Negative mg/dL    Urobilinogen Urine Normal Normal, 2.0 mg/dL    Nitrite Urine Negative Negative    Leukocyte Esterase Urine Negative Negative    Mucus Urine Present (A) None Seen /LPF    RBC Urine 2 <=2 /HPF    WBC Urine 1 <=5 /HPF    Squamous Epithelials Urine 1 <=1 /HPF    Narrative    Urine Culture not indicated   CBC with platelets and differential     Status: Abnormal   Result Value Ref Range    WBC Count 13.9 (H) 4.0 - 11.0  10e3/uL    RBC Count 4.72 3.80 - 5.20 10e6/uL    Hemoglobin 12.7 11.7 - 15.7 g/dL    Hematocrit 39.0 35.0 - 47.0 %    MCV 83 78 - 100 fL    MCH 26.9 26.5 - 33.0 pg    MCHC 32.6 31.5 - 36.5 g/dL    RDW 16.0 (H) 10.0 - 15.0 %    Platelet Count 298 150 - 450 10e3/uL   Extra Blue Top Tube     Status: None   Result Value Ref Range    Hold Specimen JIC    Extra Red Top Tube     Status: None   Result Value Ref Range    Hold Specimen JIC    HCG qualitative pregnancy (blood)     Status: Normal   Result Value Ref Range    hCG Serum Qualitative Negative Negative   Manual Differential     Status: Abnormal   Result Value Ref Range    % Neutrophils 65 %    % Lymphocytes 29 %    % Monocytes 6 %    % Eosinophils 0 %    % Basophils 0 %    NRBCs per 100 WBC 6 (H) <=0 %    Absolute Neutrophils 9.0 (H) 1.6 - 8.3 10e3/uL    Absolute Lymphocytes 4.0 0.8 - 5.3 10e3/uL    Absolute Monocytes 0.8 0.0 - 1.3 10e3/uL    Absolute Eosinophils 0.0 0.0 - 0.7 10e3/uL    Absolute Basophils 0.0 0.0 - 0.2 10e3/uL    Absolute NRBCs 0.8 (H) <=0.0 10e3/uL    RBC Morphology Confirmed RBC Indices     Platelet Assessment  Automated Count Confirmed. Platelet morphology is normal.     Automated Count Confirmed. Platelet morphology is normal.    [place] Cells Slight (A) None Seen   CBC with platelets differential     Status: Abnormal    Narrative    The following orders were created for panel order CBC with platelets differential.  Procedure                               Abnormality         Status                     ---------                               -----------         ------                     CBC with platelets and d...[[id number]]  Abnormal            Final result               Manual Differential[[id number]]          Abnormal            Final result                 Please view results for these tests on the individual orders.   [place] Draw     Status: None    Narrative    The following orders were created for panel order [place] Draw.  Procedure                                Abnormality         Status                     ---------                               -----------         ------                     Extra Blue Top Tube[[id number]]                              Final result               Extra Red Top Tube[[id number]]                               Final result                 Please view results for these tests on the individual orders.        Assessment/Plan:   Elizabeth Gil is a 41 year old  with abdominal pain, worse on right side, and left adnexal mass admitted for serial abdominal exams for rule out ovarian torsion.    Rule out ovarian torsion  Patient's exam is not immediately convincing of ovarian torsion as patient does not have rebound/guarding and abdomen is soft and non-distended. However, exam was done just after patient received morphine which clouds the picture. At this time, if patient has been torsed since 0200, very unlikely that ovary can be saved. Patient is vitally stable as well. Therefore, no indication for emergent surgery.  - Will transfer patient to [facility] and admit for observation for serial abdominal exams. If pain worsens, consider diagnostic laparoscopy with possible LSO.  - Will examine on arrival to ED and then continue pain management per Gyn team's recommendations  - NG/CT and wet prep collected on [place] and currently in process, low concern for TOA as patient is afebrile and has low concern for STI.    Discussed with Dr. Lanier.    Louise Wagoner MD  Ob/Gyn Resident, PGY-2  10/16/2021 9:29 PM

## 2021-10-17 NOTE — DISCHARGE SUMMARY
Discharge Summary    Elizabeth Gil MRN# [id number]   YOB: 1980 Age: 41 year old     Date of Admission:  10/16/2021  Date of Discharge:  10/17/21  Admitting Physician:  Екатерина Lanier MD  Discharge Physician:  Екатерина Temple MD  Discharging Service:  Gynecology     Primary Provider: [facility]          Admission Diagnoses:   - Abdominal pain, right lower quadrant [R10.31]  Abdominal pain, left lower quadrant [R10.32]  Adnexal mass [N94.89          Discharge Diagnosis:     - Same             Discharge Disposition:     Discharged to home           Procedures:   - None          Medications Prior to Admission:     Medications Prior to Admission   Medication Sig Dispense Refill Last Dose     ammonium lactate (AMLACTIN) 12 % external cream Apply as moisturizer to hands three to four times a day (Patient not taking: Reported on 3/17/2021) 385 g 11      Emollient (AQUAPHOR ADVANCED THERAPY) OINT Externally apply topically 2 times daily as needed (itching) (Patient not taking: Reported on 3/17/2021) 85 g 3      fluocinonide (LIDEX) 0.05 % external ointment Apply a thin layer to rash on hands twice a day as directed. (Patient not taking: Reported on 9/3/2021) 30 g 1      hydrochlorothiazide (HYDRODIURIL) 25 MG tablet TAKE 1 TABLET(25 MG) BY MOUTH DAILY (Patient not taking: Reported on 10/16/2021) 90 tablet 1      ibuprofen (ADVIL/MOTRIN) 600 MG tablet Take 1 tablet (600 mg) by mouth every 8 hours as needed for moderate pain (Patient not taking: Reported on 10/16/2021) 90 tablet 1      metFORMIN (GLUCOPHAGE) 500 MG tablet Take 1 tab daily for 14 days. If tolerating increase to 2 tabs daily (Patient not taking: Reported on 10/16/2021) 60 tablet 2      naltrexone (DEPADE/REVIA) 50 MG tablet Take 1/2 tablet daily (Patient not taking: Reported on 10/16/2021) 30 tablet 0      naproxen (NAPROSYN) 500 MG tablet Take 1 tablet (500 mg) by mouth 2 times daily (with meals) (Patient not taking: Reported  on 10/16/2021) 60 tablet 3      nicotine (NICODERM CQ) 21 MG/24HR 24 hr patch Place 1 patch onto the skin every 24 hours (Patient not taking: Reported on 10/16/2021) 28 patch 1      nicotine (NICORETTE) 2 MG gum Place 1 each (2 mg) inside cheek as needed for smoking cessation (Patient not taking: Reported on 3/17/2021) 100 tablet 3      order for DME Equipment being ordered: blood pressure machine (Patient not taking: Reported on 3/17/2021) 1 each 0      phentermine (ADIPEX-P) 37.5 MG tablet Take 1 tablet (37.5 mg) by mouth every morning (before breakfast) (Patient not taking: Reported on 12/11/2020) 30 tablet 3      tolterodine ER (DETROL LA) 4 MG 24 hr capsule Take 1 capsule (4 mg) by mouth daily (Patient not taking: Reported on 10/16/2021) 90 capsule 1      topiramate (TOPAMAX) 100 MG tablet TAKE ONE TABLET BY MOUTH EVERY NIGHT AT BEDTIME WITH 50MG TABLET 30 tablet 0      topiramate (TOPAMAX) 50 MG tablet TAKE ONE TABLET BY MOUTH EVERY NIGHT AT BEDTIME WITH 100MG TABLET 30 tablet 0      triamcinolone (ARISTOCORT HP) 0.5 % external cream Apply topically 2 times daily (Patient not taking: Reported on 10/16/2021) 45 g 1      vitamin D3 (CHOLECALCIFEROL) 2000 units (50 mcg) tablet TAKE 2 TABLETS BY MOUTH DAILY 180 tablet 2              Discharge Medications:     Current Discharge Medication List      START taking these medications    Details   senna-docusate (SENOKOT-S/PERICOLACE) 8.6-50 MG tablet Take 2 tablets by mouth 2 times daily as needed for constipation  Qty: 60 tablet, Refills: 1    Associated Diagnoses: Abdominal pain, left lower quadrant; Adnexal mass         CONTINUE these medications which have NOT CHANGED    Details   ammonium lactate (AMLACTIN) 12 % external cream Apply as moisturizer to hands three to four times a day  Qty: 385 g, Refills: 11    Associated Diagnoses: Chronic dermatitis of hands      Emollient (AQUAPHOR ADVANCED THERAPY) OINT Externally apply topically 2 times daily as needed  (itching)  Qty: 85 g, Refills: 3    Associated Diagnoses: Eczema, unspecified type      fluocinonide (LIDEX) 0.05 % external ointment Apply a thin layer to rash on hands twice a day as directed.  Qty: 30 g, Refills: 1    Associated Diagnoses: Chronic dermatitis of hands      hydrochlorothiazide (HYDRODIURIL) 25 MG tablet TAKE 1 TABLET(25 MG) BY MOUTH DAILY  Qty: 90 tablet, Refills: 1    Associated Diagnoses: Hypertension, unspecified type      ibuprofen (ADVIL/MOTRIN) 600 MG tablet Take 1 tablet (600 mg) by mouth every 8 hours as needed for moderate pain  Qty: 90 tablet, Refills: 1    Associated Diagnoses: Cervicalgia; Acute upper back pain      metFORMIN (GLUCOPHAGE) 500 MG tablet Take 1 tab daily for 14 days. If tolerating increase to 2 tabs daily  Qty: 60 tablet, Refills: 2    Associated Diagnoses: Class 1 obesity due to excess calories without serious comorbidity with body mass index (BMI) of 34.0 to 34.9 in adult; Insulin resistance      naltrexone (DEPADE/REVIA) 50 MG tablet Take 1/2 tablet daily  Qty: 30 tablet, Refills: 0    Associated Diagnoses: Class 1 obesity due to excess calories with body mass index (BMI) of 34.0 to 34.9 in adult, unspecified whether serious comorbidity present      naproxen (NAPROSYN) 500 MG tablet Take 1 tablet (500 mg) by mouth 2 times daily (with meals)  Qty: 60 tablet, Refills: 3    Associated Diagnoses: Carpal tunnel syndrome of right wrist      nicotine (NICODERM CQ) 21 MG/24HR 24 hr patch Place 1 patch onto the skin every 24 hours  Qty: 28 patch, Refills: 1    Associated Diagnoses: Tobacco abuse      nicotine (NICORETTE) 2 MG gum Place 1 each (2 mg) inside cheek as needed for smoking cessation  Qty: 100 tablet, Refills: 3    Associated Diagnoses: Tobacco abuse      order for DME Equipment being ordered: blood pressure machine  Qty: 1 each, Refills: 0    Associated Diagnoses: Elevated BP without diagnosis of hypertension      phentermine (ADIPEX-P) 37.5 MG tablet Take 1 tablet  "(37.5 mg) by mouth every morning (before breakfast)  Qty: 30 tablet, Refills: 3    Associated Diagnoses: Overweight      tolterodine ER (DETROL LA) 4 MG 24 hr capsule Take 1 capsule (4 mg) by mouth daily  Qty: 90 capsule, Refills: 1    Associated Diagnoses: Mixed incontinence      !! topiramate (TOPAMAX) 100 MG tablet TAKE ONE TABLET BY MOUTH EVERY NIGHT AT BEDTIME WITH 50MG TABLET  Qty: 30 tablet, Refills: 0    Associated Diagnoses: Other migraine without status migrainosus, intractable      !! topiramate (TOPAMAX) 50 MG tablet TAKE ONE TABLET BY MOUTH EVERY NIGHT AT BEDTIME WITH 100MG TABLET  Qty: 30 tablet, Refills: 0    Associated Diagnoses: Other migraine without status migrainosus, intractable      triamcinolone (ARISTOCORT HP) 0.5 % external cream Apply topically 2 times daily  Qty: 45 g, Refills: 1    Associated Diagnoses: Eczema, unspecified type      vitamin D3 (CHOLECALCIFEROL) 2000 units (50 mcg) tablet TAKE 2 TABLETS BY MOUTH DAILY  Qty: 180 tablet, Refills: 2    Associated Diagnoses: Vitamin D deficiency       !! - Potential duplicate medications found. Please discuss with provider.               Consultations:     None              Brief History of Illness:   Elizabeth Gil is a 41 year old  presenting with abdominal pain since 0200 this morning. Pain is all over her abdomen, but worst on the right side of her abdomen. Patient just had morphine and pain is improved now. Has had some nausea today but no emesis. Denies any vaginal bleeding, abnormal discharge, burning itching in vulvar region. Of note, patient has not had a BM in 2-3 days, usually has very regular BM 2-3x/day. She had pain similar to this in  which was constipation. She denies any pain with urination, however does feel that it \"feel weird\" when she urinates with increased pressure below. Denies chest pain, SOB, fevers, chills. No concern for STI's at this time but would like to be tested for NG/CT.           [facility] " Course:     The patient was admitted for monitoring and serial abdominal exams. Her pain improved significantly and required only Tylenol. On HD#2 she felt much improved, was tolerating PO and felt safe for discharge. She will follow up for a CT as an outpatient and will see Gynecology within the next 1-2 weeks.          Discharge Instructions and Follow-Up:     Discharge diet: Regular   Discharge activity: Activity as tolerated   Discharge follow-up: Follow up with with Gynecology         Dee Vega MD  Obstetrics and Gynecology, PGY-3  10/17/21 11:56 AM      Physician Attestation   I, Екатерина Temple, saw and evaluated this patient prior to discharge.  I discussed the patient with the resident/fellow and agree with plan of care as documented in the note.      I personally reviewed vital signs, medications, labs, imaging and exam.    I personally spent 20 minutes on discharge activities.    Екатерина Temple MD  Date of Service (when I saw the patient): 10/17/21

## 2021-10-17 NOTE — PROGRESS NOTES
"Gynecology Progress Note     S: Pt feels well. Today, pain is significantly improved, now 2/10. On admit was 7/10, improved with one dose morphine to 4/10. Overnight only received tylenol. Denies nausea, vomiting. Denies fever, chills, dysuria. Feeling hungry today.     O:  Vitals:    10/16/21 1620 10/16/21 2016 10/16/21 2340 10/17/21 0119   BP: (!) 160/106 (!) 141/92 (!) 147/95 131/66   Pulse: 83 88 82 76   Resp: 12 16 18 16   Temp: 98.8  F (37.1  C) 98  F (36.7  C) 98.7  F (37.1  C) (!) 96.7  F (35.9  C)   TempSrc: Oral Oral  Oral   SpO2: 100% 98% 100% 98%   Weight: 84.8 kg (187 lb)      Height: 1.6 m (5' 3\")           Gen: comfortable, no acute distress  CV: RRR, no murmurs  Lungs: Appropriate work of breathing  Abd: soft, non tender to upper abdomen, diffusely tender to lower abdomen, this morning LLQ> RLQ. no rebound or guarding.  Ext: warm and well perfused    Hemoglobin   Date Value Ref Range Status   10/16/2021 12.7 11.7 - 15.7 g/dL Final   11/05/2020 13.8 11.7 - 15.7 g/dL Final   12/09/2019 13.1 11.7 - 15.7 g/dL Final       A/P:Elizabeth Gil is a 41 year old female who is HD#2 admitted with lower abdominal pain and complex left adnexal mass for serial abdominal exams, rule out torsion. Today, pain is significantly improved with minimal pain medication, and exam remains reassuring. Discussed possible alternate imaging method to further characterize the mass for treatment planning.     #Abdominal pain  #Left adnexal mass  - Currently NPO mIVF 100cc/hr, likely okay to advance to regular diet today  - Follow up imaging - to be determined by day team   - Pain: Tylenol, oxycodone prn  - GI: Stool softeners  - f/up GCCT    #HTN  - Continue hydrochlorothiazide     #Migraines  - Continue Topamax     Dispo: Anticipate discharge to home today pending imaging and continued pain control     Yadira Corbett MD PGY3  Obstetrics & Gynecology  10/17/21       Physician Attestation   I, Екатерина Temple MD, " personally examined and evaluated this patient.  I discussed the patient with the resident/fellow and care team, and agree with the assessment and plan of care as documented in the note of 10/17/21.      I personally reviewed vital signs, medications, labs, imaging and exam.    Key findings: 41 year old  who was admitted yesterday with acute episode of lower abdominal pain, found to have complex left adnexal mass. Pain has significantly improved. Patient tolerating general diet and requesting to go home. Abdomen soft, mildly tender in lower abdomen but no rebound or guarding. Plan outpatient CT scan to follow up complex left adnexal mass and clinic visit with OB GYN to follow in the next 1-2 weeks. Discharge to home today.   Екатерина Temple MD  Date of Service (when I saw the patient): 10/17/21

## 2021-10-18 ENCOUNTER — ANCILLARY PROCEDURE (OUTPATIENT)
Dept: CT IMAGING | Facility: CLINIC | Age: 41
End: 2021-10-18
Attending: OBSTETRICS & GYNECOLOGY
Payer: COMMERCIAL

## 2021-10-18 DIAGNOSIS — N94.89 ADNEXAL MASS: ICD-10-CM

## 2021-10-18 DIAGNOSIS — R10.32 ABDOMINAL PAIN, LEFT LOWER QUADRANT: ICD-10-CM

## 2021-10-18 LAB — RADIOLOGIST FLAGS: ABNORMAL

## 2021-10-18 PROCEDURE — 74177 CT ABD & PELVIS W/CONTRAST: CPT | Performed by: RADIOLOGY

## 2021-10-18 RX ORDER — IOPAMIDOL 755 MG/ML
115 INJECTION, SOLUTION INTRAVASCULAR ONCE
Status: COMPLETED | OUTPATIENT
Start: 2021-10-18 | End: 2021-10-18

## 2021-10-18 RX ADMIN — IOPAMIDOL 115 ML: 755 INJECTION, SOLUTION INTRAVASCULAR at 16:05

## 2021-10-18 NOTE — TELEPHONE ENCOUNTER
Pt is scheduled for CT on 10/25/21.  Would you be ok seeing her when you're on-call on 10/27/21?  Elva Adam RN

## 2021-10-19 NOTE — TELEPHONE ENCOUNTER
Pt called to get results today.  Scheduled her with Dr. Lanier on Thursday to discuss next steps.  Advised ED if she is having severe pain prior to her appointment.  Elva Adam RN

## 2021-10-19 NOTE — TELEPHONE ENCOUNTER
It looks like she got the CT scan yesterday. She can be scheduled on my call day to discuss it or if anyone has an opening sooner she can be scheduled with another provider if she prefers.   Thanks  Екатерина Temple MD

## 2021-10-21 ENCOUNTER — VIRTUAL VISIT (OUTPATIENT)
Dept: OBGYN | Facility: CLINIC | Age: 41
End: 2021-10-21
Payer: COMMERCIAL

## 2021-10-21 DIAGNOSIS — N83.292 COMPLEX CYST OF LEFT OVARY: Primary | ICD-10-CM

## 2021-10-21 DIAGNOSIS — R18.8 OTHER ASCITES: ICD-10-CM

## 2021-10-21 PROCEDURE — 99214 OFFICE O/P EST MOD 30 MIN: CPT | Mod: 95 | Performed by: OBSTETRICS & GYNECOLOGY

## 2021-10-21 NOTE — PROGRESS NOTES
"Elizabeth is a 41 year old who is being evaluated via a billable telephone visit.      What phone number would you like to be contacted at? [phone]  How would you like to obtain your AVS? MyChart    1. Complex cyst of left ovary  2. Other ascites  Reviewed CT results with patient and her mother.  Discussed large complex ovarian mass, as well as fluid in the abdomen and thickening of the mesentery.  These are concerning features, especially when it comes to a potential malignant process.  For that reason, rather than doing removal of the ovarian cyst myself, I feel she is best served by seeing a GYN oncologist.  A referral was placed at the time of her appointment and I noted that an appointment was scheduled for her in the coming week.  All questions answered to patient's and her mother satisfaction.  Invited them to reach out to me with any other questions prior to their consultation with GYN oncology.  - Oncology/Hematology Adult Referral; Future    Екатерина Lanier MD      Subjective   Elizabeth is a 41 year old who presents for the following health issues:  Left adnexal mass    HPI   Was feeling a little better after leaving hospital on Sunday.  Had been feeling same symptoms as when she initially came to hospital.  Took Tylenol and felt way better.    Yesterday and today \"feels ok,\"  When she has to urinate  On period right now.  FEels like tampon pushes on her bladder and causes frequency.  Diagnosed with OAB (virtually?) and started treatment for this.  Had appointment for PFPT, but wanted to wait until dealing with this.  They said she has some prolapse.    Called her mom to conference her in to call to discuss results      Review of Systems   Constitutional, HEENT, cardiovascular, pulmonary, gi and gu systems are negative, except as otherwise noted.      Objective           Vitals:  No vitals were obtained today due to virtual visit.    Physical Exam   healthy, alert and no distress  PSYCH: Alert and " oriented times 3; coherent speech, normal   rate and volume, able to articulate logical thoughts, able   to abstract reason, no tangential thoughts, no hallucinations   or delusions  Her affect is normal  RESP: No cough, no audible wheezing, able to talk in full sentences  Remainder of exam unable to be completed due to telephone visits    Reviewed previous ultrasound and CT results, as well as notes from recent hospital stay for observation on day of encounter.         Phone call duration: 20 minutes  35 minutes spent on the date of the encounter doing chart review, review of test results, interpretation of tests, patient visit, documentation and discussion with family

## 2021-10-22 NOTE — TELEPHONE ENCOUNTER
RECORDS STATUS - ALL OTHER DIAGNOSIS      RECORDS RECEIVED FROM: [facility]   DATE RECEIVED: 10/22   NOTES STATUS DETAILS   OFFICE NOTE from referring provider [facility] Екатерина Lanier MD in [facility]: 10/21/21   OFFICE NOTE from medical oncologist     DISCHARGE SUMMARY from hospital [facility] 10/16/21   DISCHARGE REPORT from the ER     OPERATIVE REPORT     MEDICATION LIST [facility] 10/17/21   CLINICAL TRIAL TREATMENTS TO DATE     LABS     PATHOLOGY REPORTS     ANYTHING RELATED TO DIAGNOSIS Epic 10/16/21   GENONOMIC TESTING     TYPE:     IMAGING (NEED IMAGES & REPORT)     CT SCANS PACS 10/18/21: [facility]   MRI     MAMMO     ULTRASOUND PACS 10/16/21: [facility]   PET

## 2021-10-26 ENCOUNTER — ONCOLOGY VISIT (OUTPATIENT)
Dept: ONCOLOGY | Facility: CLINIC | Age: 41
End: 2021-10-26
Attending: OBSTETRICS & GYNECOLOGY
Payer: COMMERCIAL

## 2021-10-26 ENCOUNTER — PRE VISIT (OUTPATIENT)
Dept: ONCOLOGY | Facility: CLINIC | Age: 41
End: 2021-10-26

## 2021-10-26 VITALS
TEMPERATURE: 97.9 F | SYSTOLIC BLOOD PRESSURE: 150 MMHG | HEIGHT: 63 IN | HEART RATE: 105 BPM | OXYGEN SATURATION: 98 % | RESPIRATION RATE: 16 BRPM | WEIGHT: 181.2 LBS | BODY MASS INDEX: 32.11 KG/M2 | DIASTOLIC BLOOD PRESSURE: 100 MMHG

## 2021-10-26 DIAGNOSIS — R18.8 OTHER ASCITES: ICD-10-CM

## 2021-10-26 DIAGNOSIS — E66.09 CLASS 1 OBESITY DUE TO EXCESS CALORIES WITH SERIOUS COMORBIDITY AND BODY MASS INDEX (BMI) OF 33.0 TO 33.9 IN ADULT: ICD-10-CM

## 2021-10-26 DIAGNOSIS — N83.292 COMPLEX CYST OF LEFT OVARY: ICD-10-CM

## 2021-10-26 DIAGNOSIS — E66.811 CLASS 1 OBESITY DUE TO EXCESS CALORIES WITH SERIOUS COMORBIDITY AND BODY MASS INDEX (BMI) OF 33.0 TO 33.9 IN ADULT: ICD-10-CM

## 2021-10-26 DIAGNOSIS — R87.619 ABNORMAL CERVICAL PAPANICOLAOU SMEAR, UNSPECIFIED ABNORMAL PAP FINDING: Primary | ICD-10-CM

## 2021-10-26 PROCEDURE — 87624 HPV HI-RISK TYP POOLED RSLT: CPT | Performed by: OBSTETRICS & GYNECOLOGY

## 2021-10-26 PROCEDURE — G0124 SCREEN C/V THIN LAYER BY MD: HCPCS | Performed by: PATHOLOGY

## 2021-10-26 PROCEDURE — G0463 HOSPITAL OUTPT CLINIC VISIT: HCPCS

## 2021-10-26 PROCEDURE — 99205 OFFICE O/P NEW HI 60 MIN: CPT | Performed by: OBSTETRICS & GYNECOLOGY

## 2021-10-26 PROCEDURE — G0145 SCR C/V CYTO,THINLAYER,RESCR: HCPCS | Performed by: OBSTETRICS & GYNECOLOGY

## 2021-10-26 RX ORDER — ELECTROLYTES/DEXTROSE
1 SOLUTION, ORAL ORAL EVERY MORNING
COMMUNITY

## 2021-10-26 RX ORDER — ONDANSETRON 4 MG/1
4 TABLET, ORALLY DISINTEGRATING ORAL
Status: ON HOLD | COMMUNITY
Start: 2020-12-18 | End: 2021-11-16

## 2021-10-26 ASSESSMENT — MIFFLIN-ST. JEOR: SCORE: 1462.79

## 2021-10-26 ASSESSMENT — PAIN SCALES - GENERAL: PAINLEVEL: SEVERE PAIN (7)

## 2021-10-26 NOTE — PROGRESS NOTES
Gynecologic Oncology Clinic - New Patient    Referring provider:    Екатерина Lanier MD  [address] [place] [address] [address]  [place] [address]    Patient: Elizabeth Gil  : 1980    Date of Visit: Oct 26, 2021     Reason for visit: Adnexal mass    History of Present Illness:  Elizabeth Gil is a 41 year old with left adnexal mass, free fluid in abdomen/pelvis and thickening along peritoneal surfaces.     Not currently having abdominal pain. Reports has been constipated, did have a loose bowel movement. Has been taking Miralax since ED visit. Has been eating regular meals without emesis. Prior to presenting to ED on 10/16, she reports sudden, severe pain - more-so on the patient's right side in lower abdomen. No fever/chills, nausea/emesis. Eventually pain subsided while she was admitted and she was discharged. Had been taking Tylenol 500 mg and that's been working for the abdominal/pelvic pain. Has noticed more irritation if she needs to urinate but hasn't noticed any increased urinary urgency/frequency. Has noticed periodically bright red blood with wiping after bowel movements but that's her baseline and she has a history of hemorrhoids. 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 Otherwise, denies unintentional weight loss, early satiety, lower extremity edema.     Review of Systems:  As per HPI, otherwise non-contributory.     OB/Gynecologic History:  Menses: Regular cycles, heavy bleeding and cramping (no change recently), uses tampons and pads every couple of hours    x3 no complications with deliveries/pregnancies    Remote history of STI s/p treatment   Last Pap Smear: LYNDSAY, other high risk HPV positive (2019). 2018 - abnormal pap smear, had colposcopy with biopsies at that time     Past Medical History:   Diagnosis Date     Abnormal Pap smear of cervix 10/18/2018    10/18/18: See problem list.      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 Lives at home with 3 kids and .       Family History   Problem Relation Age of Onset     Thyroid Disease Mother      Heart Disease Mother      Hypertension Mother      Obesity Mother      Colon Cancer Father      Aneurysm Father      Hypertension Father      Obesity Father      Ovarian Cancer Maternal Aunt      Breast Cancer No family hx of      Uterine Cancer No family hx of        Current Outpatient Medications   Medication Sig Dispense Refill     ammonium lactate (AMLACTIN) 12 % external cream Apply as moisturizer to hands three to four times a day 385 g 11     Emollient (AQUAPHOR ADVANCED THERAPY) OINT Externally apply topically 2 times daily as needed (itching) 85 g 3     hydrochlorothiazide (HYDRODIURIL) 25 MG tablet TAKE 1 TABLET(25 MG) BY MOUTH DAILY 90 tablet 1     ibuprofen (ADVIL/MOTRIN) 600 MG tablet Take 1 tablet (600 mg) by mouth every 8 hours as needed for moderate pain 90 tablet 1     Multiple Vitamin (MULTIVITAMIN ADULT) TABS 1 tablet       nicotine (NICORETTE) 4 MG lozenge        ondansetron (ZOFRAN-ODT) 4 MG ODT tab Take 4 mg by mouth       topiramate (TOPAMAX) 100 MG tablet TAKE ONE TABLET BY MOUTH EVERY NIGHT AT BEDTIME WITH 50MG TABLET 30 tablet 0     topiramate (TOPAMAX) 50 MG tablet TAKE ONE TABLET BY MOUTH EVERY NIGHT AT BEDTIME WITH 100MG TABLET 30 tablet 0     vitamin D3 (CHOLECALCIFEROL) 2000 units (50 mcg)  tablet TAKE 2 TABLETS BY MOUTH DAILY 180 tablet 2     fluocinonide (LIDEX) 0.05 % external ointment Apply a thin layer to rash on hands twice a day as directed. (Patient not taking: Reported on 9/3/2021) 30 g 1     metFORMIN (GLUCOPHAGE) 500 MG tablet Take 1 tab daily for 14 days. If tolerating increase to 2 tabs daily (Patient not taking: Reported on 10/16/2021) 60 tablet 2     naltrexone (DEPADE/REVIA) 50 MG tablet Take 1/2 tablet daily (Patient not taking: Reported on 10/16/2021) 30 tablet 0     naproxen (NAPROSYN) 500 MG tablet Take 1 tablet (500 mg) by mouth 2 times daily (with meals) (Patient not taking: Reported on 10/16/2021) 60 tablet 3     nicotine (NICODERM CQ) 21 MG/24HR 24 hr patch Place 1 patch onto the skin every 24 hours (Patient not taking: Reported on 10/16/2021) 28 patch 1     nicotine (NICORETTE) 2 MG gum Place 1 each (2 mg) inside cheek as needed for smoking cessation (Patient not taking: Reported on 3/17/2021) 100 tablet 3     order for DME Equipment being ordered: blood pressure machine (Patient not taking: Reported on 3/17/2021) 1 each 0     phentermine (ADIPEX-P) 37.5 MG tablet Take 1 tablet (37.5 mg) by mouth every morning (before breakfast) (Patient not taking: Reported on 12/11/2020) 30 tablet 3     senna-docusate (SENOKOT-S/PERICOLACE) 8.6-50 MG tablet Take 2 tablets by mouth 2 times daily as needed for constipation (Patient not taking: Reported on 10/26/2021) 60 tablet 1     tolterodine ER (DETROL LA) 4 MG 24 hr capsule Take 1 capsule (4 mg) by mouth daily (Patient not taking: Reported on 10/16/2021) 90 capsule 1     triamcinolone (ARISTOCORT HP) 0.5 % external cream Apply topically 2 times daily (Patient not taking: Reported on 10/16/2021) 45 g 1        Allergies   Allergen Reactions     Morphine Itching       Physical Exam:   BP (!) 150/100 (BP Location: Right arm, Patient Position: Sitting, Cuff Size: Adult Regular)   Pulse 105   Temp 97.9  F (36.6  C) (Oral)   Resp 16   Ht 1.595  "m (5' 2.8\")   Wt 82.2 kg (181 lb 3.2 oz)   SpO2 98%   BMI 32.31 kg/m    General appearance: Alert and oriented, no acute distress, well groomed  Resp: Normal respiratory effort.  GI: Abdomen soft, mildly tender in RLQ, non-distended. No palpable masses  Skin: No skin changes or lesions  Psych: Alert and oriented, appropriate affect  Lymph: No palpable lymphadenopathy in the neck, supraclavicular region  Genitourinary:  External: anatomically normal appearing labia majora, minora, and clitoral ryan. No lesions noted  Vagina:  mucosa without lesions  Cervix: normal in appearance without lesions  Bimanual exam: Right adnexal tenderness     Labs/Pathology:   Cr 0.65, Hgb 12.7, Plts 298 (10/16/21)     Imaging:     CT Abdomen/Pelvis 10/16/21  Large heterogeneous left adnexal soft tissue mass measuring 9.8 x 6.2 x 6.0 cm  with areas of central hypoattenuation and calcifications. The right ovary is unremarkable. Mildly heterogeneous appearance of the uterus with small amount of fluid in the endometrial canal. Stomach, small intestine, and colon are unremarkable. Prominent air-filled appendix. No suspicious abdominal or pelvic lymphadenopathy. Small to moderate amount of free fluid in the pelvis and bilateral upper quadrants. Some of the fluid measures higher than simple attenuation for example in the left upper quadrant. Slightly lobulated borders in the right midabdomen. No pneumoperitoneum. There is some increased attenuation of the mesentery with slight thickening along the anterior peritoneal surface, nonspecific. Some of the ascites in the right mid flank region has a lobulated appearance.     Ultrasound 10/16/21  The uterus measures 9.9 x 4.2 x 4.1 cm and demonstrates normal  homogeneous echotexture. The endometrial stripe measures 10 mm in  thickness. Small amount of free fluid in the pelvis.     Right ovary measures 3.8 x 1.8 x 1.6 cm and demonstrates normal  follicular architecture. Normal low resistance " arterial waveform.     Complex left adnexal lesion measuring approximately 9.0 x 5.4 x 4.2  cm. Centrally within the lesion, there are cystic structures which  appear to represent normal ovarian follicular architecture. There is a  normal low resistance arterial waveform to the left adnexa; however,  this can be present in a torsed ovary as the ovary has 2 arterial  supplies.        Assessment:  41 year old with large heterogeneous left adnexal soft tissue mass measuring 9.8 x 6.2 x 6.0 cm  with areas of central hypoattenuation and calcifications, prominent air-filled appendix and evidence of thickening along the anterior peritoneal surface and increased attenuation of the mesentery. No evidence of malignancy - mostly likely benign but counseled patient that we cannot know for certain without removal and pathologic evaluation of the ovarian mass.    Plan:   1. Left adnexal mass: Recommended laparoscopic left salpingoophrectomy, pelvic washings, possible right salpingectomy, possible cancer staging with hysterectomy, lymph node dissection, omentectomy, biopsies, possible laparotomy. We discussed the appearance of her appendix on CT scan, slightly elevated WBC at ED visit and her R > L lower abdominal pain. Discussed possibility that appendix might be grossly abnormal and if that's the case, we would recommended removal at the time of surgery. Patient agrees to laparoscopic left salpingoophrectomy, pelvic washings, possible cancer staging, possible laparotomy, possible appendectomy BUT would like more time to consider right salpingectomy.   ---Surgical consents to be signed day of surgery.   ---Counseled on inflammation, adhesions, torsion and possible infection at time of surgery which could increase risk for laparotomy. She verbalized understanding. Discussed surgical risks not limited to bleeding, infection, damage to surrounding organs, need for blood transfusions and ICU stay.   ---Reviewed postoperative  restrictions and expected recovery time for laparoscopy and laparotomy     2. Stress urinary incontinence: Had seen Dr. Redd 4/2021 - positive cough stress test. Recommended TVT, A&P at that time. I advised would be best to manage conservatively and see if any symptoms resolve with removal of the mass. Patient agreeable with this plan.    3. COVID vaccination complete 9/2021      4. Health maintenance: Recommended PCP follow-up for management of her hypertension, she's currently looking for a PCP      60 minutes spent on the date of the encounter doing chart review, history and exam, documentation and further activities as noted above    Fouzia Young MD  Gynecologic Oncology  [facility]

## 2021-10-26 NOTE — LETTER
10/26/2021         RE: Elizabeth Gil  [address]  [place] [address]        Dear Colleague,    Thank you for referring your patient, Elizabeth Gil, to the [facility]. Please see a copy of my visit note below.    Gynecologic Oncology Clinic - New Patient    Referring provider:    Екатерина Lanier MD  [address]  [place] [address]    Patient: Elizabeth Gil  : 1980    Date of Visit: Oct 26, 2021     Reason for visit: Adnexal mass    History of Present Illness:  Elizabeth Gil is a 41 year old with left adnexal mass, free fluid in abdomen/pelvis and thickening along peritoneal surfaces.     Not currently having abdominal pain. Reports has been constipated, did have a loose bowel movement. Has been taking Miralax since ED visit. Has been eating regular meals without emesis. Prior to presenting to ED on 10/16, she reports sudden, severe pain - more-so on the patient's right side in lower abdomen. No fever/chills, nausea/emesis. Eventually pain subsided while she was admitted and she was discharged. Had been taking Tylenol 500 mg and that's been working for the abdominal/pelvic pain. Has noticed more irritation if she needs to urinate but hasn't noticed any increased urinary urgency/frequency. Has noticed periodically bright red blood with wiping after bowel movements but that's her baseline and she has a history of hemorrhoids. 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 Otherwise, denies unintentional weight loss, early satiety, lower extremity edema.     Review of Systems:  As per HPI, otherwise non-contributory.     OB/Gynecologic History:  Menses: Regular cycles, heavy bleeding and cramping (no change recently), uses tampons and pads every couple of hours    x3 no complications with deliveries/pregnancies    Remote history of STI s/p treatment   Last Pap  Smear: LYNDSAY, other high risk HPV positive (2019). 2018 - abnormal pap smear, had colposcopy with biopsies at that time     Past Medical History:   Diagnosis Date     Abnormal Pap smear of cervix 10/18/2018    10/18/18: See problem list.      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       Family History   Problem Relation Age of Onset     Thyroid Disease Mother      Heart Disease Mother      Hypertension Mother      Obesity Mother      Colon Cancer Father      Aneurysm Father      Hypertension Father      Obesity Father      Ovarian Cancer Maternal Aunt      Breast Cancer No family hx of      Uterine Cancer No family hx of        Current Outpatient Medications   Medication Sig Dispense Refill     ammonium lactate (AMLACTIN) 12 % external cream Apply as moisturizer to hands three to four times a day 385 g 11     Emollient (AQUAPHOR ADVANCED THERAPY) OINT Externally apply topically 2 times daily as needed (itching) 85 g 3     hydrochlorothiazide (HYDRODIURIL) 25 MG tablet TAKE 1 TABLET(25 MG) BY MOUTH DAILY 90 tablet 1     ibuprofen (ADVIL/MOTRIN) 600 MG tablet Take 1 tablet (600 mg) by mouth every 8 hours as needed for moderate pain 90 tablet 1     Multiple Vitamin (MULTIVITAMIN ADULT) TABS 1 tablet       nicotine (NICORETTE) 4 MG lozenge        ondansetron (ZOFRAN-ODT) 4 MG ODT tab Take 4 mg by mouth       topiramate (TOPAMAX) 100 MG  tablet TAKE ONE TABLET BY MOUTH EVERY NIGHT AT BEDTIME WITH 50MG TABLET 30 tablet 0     topiramate (TOPAMAX) 50 MG tablet TAKE ONE TABLET BY MOUTH EVERY NIGHT AT BEDTIME WITH 100MG TABLET 30 tablet 0     vitamin D3 (CHOLECALCIFEROL) 2000 units (50 mcg) tablet TAKE 2 TABLETS BY MOUTH DAILY 180 tablet 2     fluocinonide (LIDEX) 0.05 % external ointment Apply a thin layer to rash on hands twice a day as directed. (Patient not taking: Reported on 9/3/2021) 30 g 1     metFORMIN (GLUCOPHAGE) 500 MG tablet Take 1 tab daily for 14 days. If tolerating increase to 2 tabs daily (Patient not taking: Reported on 10/16/2021) 60 tablet 2     naltrexone (DEPADE/REVIA) 50 MG tablet Take 1/2 tablet daily (Patient not taking: Reported on 10/16/2021) 30 tablet 0     naproxen (NAPROSYN) 500 MG tablet Take 1 tablet (500 mg) by mouth 2 times daily (with meals) (Patient not taking: Reported on 10/16/2021) 60 tablet 3     nicotine (NICODERM CQ) 21 MG/24HR 24 hr patch Place 1 patch onto the skin every 24 hours (Patient not taking: Reported on 10/16/2021) 28 patch 1     nicotine (NICORETTE) 2 MG gum Place 1 each (2 mg) inside cheek as needed for smoking cessation (Patient not taking: Reported on 3/17/2021) 100 tablet 3     order for DME Equipment being ordered: blood pressure machine (Patient not taking: Reported on 3/17/2021) 1 each 0     phentermine (ADIPEX-P) 37.5 MG tablet Take 1 tablet (37.5 mg) by mouth every morning (before breakfast) (Patient not taking: Reported on 12/11/2020) 30 tablet 3     senna-docusate (SENOKOT-S/PERICOLACE) 8.6-50 MG tablet Take 2 tablets by mouth 2 times daily as needed for constipation (Patient not taking: Reported on 10/26/2021) 60 tablet 1     tolterodine ER (DETROL LA) 4 MG 24 hr capsule Take 1 capsule (4 mg) by mouth daily (Patient not taking: Reported on 10/16/2021) 90 capsule 1     triamcinolone (ARISTOCORT HP) 0.5 % external cream Apply topically 2 times daily (Patient not taking: Reported on  "10/16/2021) 45 g 1        Allergies   Allergen Reactions     Morphine Itching       Physical Exam:   BP (!) 150/100 (BP Location: Right arm, Patient Position: Sitting, Cuff Size: Adult Regular)   Pulse 105   Temp 97.9  F (36.6  C) (Oral)   Resp 16   Ht 1.595 m (5' 2.8\")   Wt 82.2 kg (181 lb 3.2 oz)   SpO2 98%   BMI 32.31 kg/m    General appearance: Alert and oriented, no acute distress, well groomed  Resp: Normal respiratory effort.  GI: Abdomen soft, mildly tender in RLQ, non-distended. No palpable masses  Skin: No skin changes or lesions  Psych: Alert and oriented, appropriate affect  Lymph: No palpable lymphadenopathy in the neck, supraclavicular region  Genitourinary:  External: anatomically normal appearing labia majora, minora, and clitoral ryan. No lesions noted  Vagina:  mucosa without lesions  Cervix: normal in appearance without lesions  Bimanual exam: Right adnexal tenderness     Labs/Pathology:   Cr 0.65, Hgb 12.7, Plts 298 (10/16/21)     Imaging:     CT Abdomen/Pelvis 10/16/21  Large heterogeneous left adnexal soft tissue mass measuring 9.8 x 6.2 x 6.0 cm  with areas of central hypoattenuation and calcifications. The right ovary is unremarkable. Mildly heterogeneous appearance of the uterus with small amount of fluid in the endometrial canal. Stomach, small intestine, and colon are unremarkable. Prominent air-filled appendix. No suspicious abdominal or pelvic lymphadenopathy. Small to moderate amount of free fluid in the pelvis and bilateral upper quadrants. Some of the fluid measures higher than simple attenuation for example in the left upper quadrant. Slightly lobulated borders in the right midabdomen. No pneumoperitoneum. There is some increased attenuation of the mesentery with slight thickening along the anterior peritoneal surface, nonspecific. Some of the ascites in the right mid flank region has a lobulated appearance.     Ultrasound 10/16/21  The uterus measures 9.9 x 4.2 x 4.1 cm and " demonstrates normal  homogeneous echotexture. The endometrial stripe measures 10 mm in  thickness. Small amount of free fluid in the pelvis.     Right ovary measures 3.8 x 1.8 x 1.6 cm and demonstrates normal  follicular architecture. Normal low resistance arterial waveform.     Complex left adnexal lesion measuring approximately 9.0 x 5.4 x 4.2  cm. Centrally within the lesion, there are cystic structures which  appear to represent normal ovarian follicular architecture. There is a  normal low resistance arterial waveform to the left adnexa; however,  this can be present in a torsed ovary as the ovary has 2 arterial  supplies.        Assessment:  41 year old with large heterogeneous left adnexal soft tissue mass measuring 9.8 x 6.2 x 6.0 cm  with areas of central hypoattenuation and calcifications, prominent air-filled appendix and evidence of thickening along the anterior peritoneal surface and increased attenuation of the mesentery. No evidence of malignancy - mostly likely benign but counseled patient that we cannot know for certain without removal and pathologic evaluation of the ovarian mass.    Plan:   1. Left adnexal mass: Recommended laparoscopic left salpingoophrectomy, pelvic washings, possible right salpingectomy, possible cancer staging with hysterectomy, lymph node dissection, omentectomy, biopsies, possible laparotomy. We discussed the appearance of her appendix on CT scan, slightly elevated WBC at ED visit and her R > L lower abdominal pain. Discussed possibility that appendix might be grossly abnormal and if that's the case, we would recommended removal at the time of surgery. Patient agrees to laparoscopic left salpingoophrectomy, pelvic washings, possible cancer staging, possible laparotomy, possible appendectomy BUT would like more time to consider right salpingectomy.   ---Surgical consents to be signed day of surgery.   ---Counseled on inflammation, adhesions, torsion and possible infection at  time of surgery which could increase risk for laparotomy. She verbalized understanding. Discussed surgical risks not limited to bleeding, infection, damage to surrounding organs, need for blood transfusions and ICU stay.   ---Reviewed postoperative restrictions and expected recovery time for laparoscopy and laparotomy     2. Stress urinary incontinence: Had seen Dr. Redd 4/2021 - positive cough stress test. Recommended TVT, A&P at that time. I advised would be best to manage conservatively and see if any symptoms resolve with removal of the mass. Patient agreeable with this plan.    3. COVID vaccination complete 9/2021      4. Health maintenance: Recommended PCP follow-up for management of her hypertension, she's currently looking for a PCP      60 minutes spent on the date of the encounter doing chart review, history and exam, documentation and further activities as noted above    Fouzia Young MD  Gynecologic Oncology  [facility]        Again, thank you for allowing me to participate in the care of your patient.        Sincerely,        Fouzia Young MD

## 2021-10-26 NOTE — NURSING NOTE
"Oncology Rooming Note    October 26, 2021 12:36 PM   Elizabeth Gil is a 41 year old female who presents for:    Chief Complaint   Patient presents with     Oncology Clinic Visit     COMPLEX CYST OF LEFT OVARY; OTHER ASCITES     Initial Vitals: BP (!) 150/100 (BP Location: Right arm, Patient Position: Sitting, Cuff Size: Adult Regular)   Pulse 105   Temp 97.9  F (36.6  C) (Oral)   Resp 16   Ht 1.595 m (5' 2.8\")   Wt 82.2 kg (181 lb 3.2 oz)   SpO2 98%   BMI 32.31 kg/m   Estimated body mass index is 32.31 kg/m  as calculated from the following:    Height as of this encounter: 1.595 m (5' 2.8\").    Weight as of this encounter: 82.2 kg (181 lb 3.2 oz). Body surface area is 1.91 meters squared.  Severe Pain (7) Comment: Data Unavailable   No LMP recorded.  Allergies reviewed: Yes  Medications reviewed: Yes    Medications: Medication refills not needed today.  Pharmacy name entered into [employer]: [id number] - [place], [place] - [address]    Clinical concerns: Notes severe pain from shoulder into neck, arm, and upper back.        Naomi Winslow CMA            "

## 2021-10-26 NOTE — LETTER
10/26/2021      RE: Elizabeth Gil  [address] [facility] [address]       Gynecologic Oncology Clinic - New Patient    Referring provider:    Екатерина Lanier MD  [address] [facility] [address]  [place] [address]    Patient: Elizabeth Gil  : 1980    Date of Visit: Oct 26, 2021     Reason for visit: Adnexal mass    History of Present Illness:  Elizabeth Gil is a 41 year old with left adnexal mass, free fluid in abdomen/pelvis and thickening along peritoneal surfaces.     Not currently having abdominal pain. Reports has been constipated, did have a loose bowel movement. Has been taking Miralax since ED visit. Has been eating regular meals without emesis. Prior to presenting to ED on 10/16, she reports sudden, severe pain - more-so on the patient's right side in lower abdomen. No fever/chills, nausea/emesis. Eventually pain subsided while she was admitted and she was discharged. Had been taking Tylenol 500 mg and that's been working for the abdominal/pelvic pain. Has noticed more irritation if she needs to urinate but hasn't noticed any increased urinary urgency/frequency. Has noticed periodically bright red blood with wiping after bowel movements but that's her baseline and she has a history of hemorrhoids. 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 Otherwise, denies unintentional weight loss, early satiety, lower extremity edema.     Review of Systems:  As per HPI, otherwise non-contributory.     OB/Gynecologic History:  Menses: Regular cycles, heavy bleeding and cramping (no change recently), uses tampons and pads every couple of hours    x3 no complications with deliveries/pregnancies    Remote history of STI s/p treatment   Last Pap Smear: hermes UMANA high risk HPV positive (2019). 2018 - abnormal pap smear, had colposcopy with biopsies at that time     Past Medical History:   Diagnosis Date     Abnormal  Pap smear of cervix 10/18/2018    10/18/18: See problem list.      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       Family History   Problem Relation Age of Onset     Thyroid Disease Mother      Heart Disease Mother      Hypertension Mother      Obesity Mother      Colon Cancer Father      Aneurysm Father      Hypertension Father      Obesity Father      Ovarian Cancer Maternal Aunt      Breast Cancer No family hx of      Uterine Cancer No family hx of        Current Outpatient Medications   Medication Sig Dispense Refill     ammonium lactate (AMLACTIN) 12 % external cream Apply as moisturizer to hands three to four times a day 385 g 11     Emollient (AQUAPHOR ADVANCED THERAPY) OINT Externally apply topically 2 times daily as needed (itching) 85 g 3     hydrochlorothiazide (HYDRODIURIL) 25 MG tablet TAKE 1 TABLET(25 MG) BY MOUTH DAILY 90 tablet 1     ibuprofen (ADVIL/MOTRIN) 600 MG tablet Take 1 tablet (600 mg) by mouth every 8 hours as needed for moderate pain 90 tablet 1     Multiple Vitamin (MULTIVITAMIN ADULT) TABS 1 tablet       nicotine (NICORETTE) 4 MG lozenge        ondansetron (ZOFRAN-ODT) 4 MG ODT tab Take 4 mg by mouth       topiramate (TOPAMAX) 100 MG tablet TAKE ONE TABLET BY MOUTH EVERY NIGHT AT BEDTIME WITH 50MG TABLET 30 tablet 0     topiramate (TOPAMAX) 50 MG tablet TAKE ONE TABLET BY MOUTH EVERY NIGHT AT BEDTIME WITH  100MG TABLET 30 tablet 0     vitamin D3 (CHOLECALCIFEROL) 2000 units (50 mcg) tablet TAKE 2 TABLETS BY MOUTH DAILY 180 tablet 2     fluocinonide (LIDEX) 0.05 % external ointment Apply a thin layer to rash on hands twice a day as directed. (Patient not taking: Reported on 9/3/2021) 30 g 1     metFORMIN (GLUCOPHAGE) 500 MG tablet Take 1 tab daily for 14 days. If tolerating increase to 2 tabs daily (Patient not taking: Reported on 10/16/2021) 60 tablet 2     naltrexone (DEPADE/REVIA) 50 MG tablet Take 1/2 tablet daily (Patient not taking: Reported on 10/16/2021) 30 tablet 0     naproxen (NAPROSYN) 500 MG tablet Take 1 tablet (500 mg) by mouth 2 times daily (with meals) (Patient not taking: Reported on 10/16/2021) 60 tablet 3     nicotine (NICODERM CQ) 21 MG/24HR 24 hr patch Place 1 patch onto the skin every 24 hours (Patient not taking: Reported on 10/16/2021) 28 patch 1     nicotine (NICORETTE) 2 MG gum Place 1 each (2 mg) inside cheek as needed for smoking cessation (Patient not taking: Reported on 3/17/2021) 100 tablet 3     order for DME Equipment being ordered: blood pressure machine (Patient not taking: Reported on 3/17/2021) 1 each 0     phentermine (ADIPEX-P) 37.5 MG tablet Take 1 tablet (37.5 mg) by mouth every morning (before breakfast) (Patient not taking: Reported on 12/11/2020) 30 tablet 3     senna-docusate (SENOKOT-S/PERICOLACE) 8.6-50 MG tablet Take 2 tablets by mouth 2 times daily as needed for constipation (Patient not taking: Reported on 10/26/2021) 60 tablet 1     tolterodine ER (DETROL LA) 4 MG 24 hr capsule Take 1 capsule (4 mg) by mouth daily (Patient not taking: Reported on 10/16/2021) 90 capsule 1     triamcinolone (ARISTOCORT HP) 0.5 % external cream Apply topically 2 times daily (Patient not taking: Reported on 10/16/2021) 45 g 1        Allergies   Allergen Reactions     Morphine Itching       Physical Exam:   BP (!) 150/100 (BP Location: Right arm, Patient Position: Sitting, Cuff Size: Adult  "Regular)   Pulse 105   Temp 97.9  F (36.6  C) (Oral)   Resp 16   Ht 1.595 m (5' 2.8\")   Wt 82.2 kg (181 lb 3.2 oz)   SpO2 98%   BMI 32.31 kg/m    General appearance: Alert and oriented, no acute distress, well groomed  Resp: Normal respiratory effort.  GI: Abdomen soft, mildly tender in RLQ, non-distended. No palpable masses  Skin: No skin changes or lesions  Psych: Alert and oriented, appropriate affect  Lymph: No palpable lymphadenopathy in the neck, supraclavicular region  Genitourinary:  External: anatomically normal appearing labia majora, minora, and clitoral ryan. No lesions noted  Vagina:  mucosa without lesions  Cervix: normal in appearance without lesions  Bimanual exam: Right adnexal tenderness     Labs/Pathology:   Cr 0.65, Hgb 12.7, Plts 298 (10/16/21)     Imaging:     CT Abdomen/Pelvis 10/16/21  Large heterogeneous left adnexal soft tissue mass measuring 9.8 x 6.2 x 6.0 cm  with areas of central hypoattenuation and calcifications. The right ovary is unremarkable. Mildly heterogeneous appearance of the uterus with small amount of fluid in the endometrial canal. Stomach, small intestine, and colon are unremarkable. Prominent air-filled appendix. No suspicious abdominal or pelvic lymphadenopathy. Small to moderate amount of free fluid in the pelvis and bilateral upper quadrants. Some of the fluid measures higher than simple attenuation for example in the left upper quadrant. Slightly lobulated borders in the right midabdomen. No pneumoperitoneum. There is some increased attenuation of the mesentery with slight thickening along the anterior peritoneal surface, nonspecific. Some of the ascites in the right mid flank region has a lobulated appearance.     Ultrasound 10/16/21  The uterus measures 9.9 x 4.2 x 4.1 cm and demonstrates normal  homogeneous echotexture. The endometrial stripe measures 10 mm in  thickness. Small amount of free fluid in the pelvis.     Right ovary measures 3.8 x 1.8 x 1.6 cm " and demonstrates normal  follicular architecture. Normal low resistance arterial waveform.     Complex left adnexal lesion measuring approximately 9.0 x 5.4 x 4.2  cm. Centrally within the lesion, there are cystic structures which  appear to represent normal ovarian follicular architecture. There is a  normal low resistance arterial waveform to the left adnexa; however,  this can be present in a torsed ovary as the ovary has 2 arterial  supplies.        Assessment:  41 year old with large heterogeneous left adnexal soft tissue mass measuring 9.8 x 6.2 x 6.0 cm  with areas of central hypoattenuation and calcifications, prominent air-filled appendix and evidence of thickening along the anterior peritoneal surface and increased attenuation of the mesentery. No evidence of malignancy - mostly likely benign but counseled patient that we cannot know for certain without removal and pathologic evaluation of the ovarian mass.    Plan:   1. Left adnexal mass: Recommended laparoscopic left salpingoophrectomy, pelvic washings, possible right salpingectomy, possible cancer staging with hysterectomy, lymph node dissection, omentectomy, biopsies, possible laparotomy. We discussed the appearance of her appendix on CT scan, slightly elevated WBC at ED visit and her R > L lower abdominal pain. Discussed possibility that appendix might be grossly abnormal and if that's the case, we would recommended removal at the time of surgery. Patient agrees to laparoscopic left salpingoophrectomy, pelvic washings, possible cancer staging, possible laparotomy, possible appendectomy BUT would like more time to consider right salpingectomy.   ---Surgical consents to be signed day of surgery.   ---Counseled on inflammation, adhesions, torsion and possible infection at time of surgery which could increase risk for laparotomy. She verbalized understanding. Discussed surgical risks not limited to bleeding, infection, damage to surrounding organs, need  for blood transfusions and ICU stay.   ---Reviewed postoperative restrictions and expected recovery time for laparoscopy and laparotomy     2. Stress urinary incontinence: Had seen Dr. Redd 4/2021 - positive cough stress test. Recommended TVT, A&P at that time. I advised would be best to manage conservatively and see if any symptoms resolve with removal of the mass. Patient agreeable with this plan.    3. COVID vaccination complete 9/2021      4. Health maintenance: Recommended PCP follow-up for management of her hypertension, she's currently looking for a PCP      60 minutes spent on the date of the encounter doing chart review, history and exam, documentation and further activities as noted above    Fouzia Young MD  Gynecologic Oncology  [facility]

## 2021-10-27 ENCOUNTER — DOCUMENTATION ONLY (OUTPATIENT)
Dept: ONCOLOGY | Facility: CLINIC | Age: 41
End: 2021-10-27

## 2021-10-27 DIAGNOSIS — Z11.59 ENCOUNTER FOR SCREENING FOR OTHER VIRAL DISEASES: ICD-10-CM

## 2021-10-27 RX ORDER — CEFAZOLIN SODIUM 2 G/50ML
2 SOLUTION INTRAVENOUS
Status: CANCELLED | OUTPATIENT
Start: 2021-10-27

## 2021-10-27 RX ORDER — CEFAZOLIN SODIUM 2 G/50ML
2 SOLUTION INTRAVENOUS SEE ADMIN INSTRUCTIONS
Status: CANCELLED | OUTPATIENT
Start: 2021-10-27

## 2021-10-27 RX ORDER — ACETAMINOPHEN 325 MG/1
975 TABLET ORAL ONCE
Status: CANCELLED | OUTPATIENT
Start: 2021-10-27 | End: 2021-10-27

## 2021-10-27 RX ORDER — HEPARIN SODIUM 5000 [USP'U]/.5ML
5000 INJECTION, SOLUTION INTRAVENOUS; SUBCUTANEOUS
Status: CANCELLED | OUTPATIENT
Start: 2021-10-27

## 2021-10-27 NOTE — TELEPHONE ENCOUNTER
FUTURE VISIT INFORMATION      SURGERY INFORMATION:    Date: 21    Location: uu or    Surgeon:  Fouzia Young MD    Anesthesia Type:  general    Procedure: Laparoscopic left salpingo-oophorectomy possible laparotomy, possible cancer staging, possible hysterectomy with right salpingo-oophorectomy possible appendectomy possible lymph nodes, biopsies    Consult: ov 10/26/21    RECORDS REQUESTED FROM:        Primary Care Provider: Danna Enrique APRN [facility]    Pertinent Medical History: hypertension    Most recent EKG+ Tracin21- [facility]

## 2021-10-27 NOTE — PATIENT INSTRUCTIONS
You have been scheduled for surgery on: Tuesday, Nov 16    Diagnosis:  Pelvic mass    The surgical procedure is: Laparoscopic left salpingo-oophorectomy, possible laparotomy, possible cancer staging, possible hysterectomy with right salpingo-oophorectomy, appendectomy, lymph nodes, biopsies    Anticipated length of surgery: 3-4 hrs.  You will be in the recovery room for approximately 2-3 hrs after surgery.  Your family will not be able to see you until after you leave the recovery room.    Length of hospital stay:  This is an outpatient, extended stay surgery.  You will be discharged same day if you meet criteria for discharge.  If you have a larger surgical incision, you will need to be in the hospital 3-5 days.  ______________________________________________________________________    Preparation for Surgery:    Diet:  Nothing to eat or drink after midnight prior to surgery    Tests prior to surgery   1.  Labs: ordered      Preoperative clearance appointment:  You will need to see your primary care physician prior to surgery for evaluation    Postoperative Restrictions:  No heavy lifting >20lbs for eight weeks, nothing in the vagina (no tampons, no intercourse, no douching) for eight weeks.     Postoperative visit:  Return to clinic 2-3 weeks after surgery for post operative visit.      Please contact the clinic with any questions or concerns.    Fouzia Young MD  Gynecologic Oncology  [facility]    PLEASE REVIEW THE FOLLOWING INSTRUCTIONS IN ANTICIPATION OF YOUR UPCOMING SURGERY.  PLEASE BE AWARE THAT SOME OF THESE INSTRUCTIONS MAY CHANGE PENDING THE OUTCOME OF YOUR SURGERY AND HOSPITAL STAY          GENERAL POST-OPERATIVE  PATIENT INSTRUCTIONS      FOLLOW-UP:    Call Surgeon if you have:    Temperature greater than 100.4    Persistent nausea and vomiting    Severe uncontrolled pain    Redness, tenderness, or signs of infection (pain, swelling, redness, odor or green/yellow discharge  around the site)    Difficulty breathing, headache or visual disturbances    Hives    Persistent dizziness or light-headedness    Extreme fatigue    Any other questions or concerns you may have after discharge    In an emergency, call 911 or go to an Emergency Department at a nearby hospital       WOUND CARE INSTRUCTIONS:  .  Keep a dry clean dressing on the wound if there is drainage. The initial bandage may be removed after 24 hours.  Once the wound has quit draining you may leave it open to air.  If clothing rubs against the wound or causes irritation and the wound is not draining you may cover it with a dry dressing during the daytime.  Try to keep the wound dry and avoid ointments on the wound unless directed to do so.  If the wound becomes bright red and painful or starts to drain infected material that is not clear, please contact your physician immediately.  If the wound is mildly pink and has a thick firm ridge underneath it, this is normal, and is referred to as a healing ridge.  This will resolve over the next 4-6 weeks.      You may shower 48 hrs after surgery    No soaking in the tub for 10 days after surgery.       DIET:  There are  no dietary restrictions.  You may eat any foods that you can tolerate.  It is a good idea to eat a high fiber diet and take in plenty of fluids to prevent constipation.  If you do become constipated you may want to take a mild laxative or take dulcolax tablets on a daily basis until your bowel habits are regular.  Constipation can be very uncomfortable, along with straining, after recent surgery.    ACTIVITY:  You are encouraged to cough and deep breath or use your incentive spirometer if you were given one, every 15-30 minutes when awake.  This will help prevent respiratory complications and low grade fevers post-operatively if you had a general anesthetic.  You may want to hug a pillow when coughing and sneezing to add additional support to the surgical area, if you had  abdominal or chest surgery, which will decrease pain during these times.        No heavy lifting >20lbs or strenuous exercise for  six-eight weeks.  No exercise  in which you are using core muscles (yoga, pilates, swimming, weight lifting)    You may walk as much as you wish.  You are encouraged to increase your activity each day after surgery.  Stairs are okay.     Nothing per vagina for  six-eight weeks.  No tampons, no intercourse, no douching.  You can expect some light vaginal spotting and discharge for up to six weeks.  If bleeding becomes heavy, please contact the office.     PAIN MEDICATIONS:  Take your medications as prescribed.  Try to take narcotic medications and anti-inflammatory medications, such as tylenol, ibuprofen, naprosyn, etc., with food.  This will minimize stomach upset from the medication.  Should you develop nausea and vomiting from the pain medication, or develop a rash, please discontinue the medication and contact your physician.  You should not drive, make important decisions, or operate machinery when taking narcotic pain medication.    You may be discharged home with an anti-inflammatory medication (Ibuprofen) as well as a narcotic pain medication (Vicodin or Percocet).      Some important reminders:      Percocet, Vicodin:    o Purpose: Used at home, these drugs are very effective at controlling mild to moderate pain.   o Side Effects: Drowsiness, upset stomach. Should be taken with food to lessen upset stomach.   o Do not take additional Tylenol (Acetaminophen) while taking Vicodin or Percocet.  Both these medications already contain Tylenol.      Ibuprofen (Advil):  o Purpose: Nonsteroidal anti-inflammatory drugs (NSAIDS). Reduces swelling and inflammation and will relieve mild to moderate pain associated with swelling  o Side Effects: Stomach upset, dizziness. Should not be taken if you have kidney problems, a history of stomach ulcers, heart failure or are on other blood thinner  medications such as Coumadin (warfarin).     General tips for using medication :       Take your pain medication as directed    Avoid taking both types of pain medication at the same time; rather, alternate between taking the anti-inflammatory medication (Ibuprofen) and the narcotic medication (Vicodin or Percocet).  For example:  o Ibuprofen is often taken every six hours.  You can take as prescribed at 6am, 12pm, 6pm etc.   o Narcotic pain medication (Vicodin or Percocet) can also be taken every six hours.  You can take as prescribed at 9am, 3pm, 9pm etc.  o By alternating your pain medication, you are taking a form of pain medication every three hours.  This will give you better pain relief.  o As you start to heal from surgery and your pain is less, take the medication only if needed.       OTHER:  Patients are often constipated after general anesthesia and surgery.  The patient should continue to take stool softeners (for example, Senokot-S) for the next six weeks and consume adequate amounts of water.  If the patient remains constipated or unable to pass stool, please try one or all of the following measures:    1.  Milk of Magnesia 30cc twice a day as needed by mouth  2.  Metamucil 2 tablespoons in 12 ounces of fluid  3.  Dulcolax oral or suppositories  4.  Prunes or prune juice  5.  Miralax daily      QUESTIONS:  Please feel free to call your physician or the hospital  if you have any questions, and they will be glad to assist you.      Fouzia Young MD  Gynecologic Oncology  [facility]

## 2021-10-27 NOTE — PROGRESS NOTES
RNCC: Jodi Macario; [phone]    Surgery is scheduled with Dr. Young on 11/16 at [facility].  Scheduled per orders.    H&P to be completed by PAC: Scheduled in person on 11/12    COVID-19 [place] AND Labs[place] [facility]     Post-op: 12/14 as a virtual visit .    The RN completed the education regarding the surgery.     Patient will receive surgery arrival and start time from PAC.    The surgery packet was provided by the RN during appointment.    Patient will complete COVID-19 test that was scheduled by surgical coordinator 2-4 days prior to surgery.     I sent a MyChart to confirm the information above, will call if the patient requests.

## 2021-10-29 LAB
BKR LAB AP GYN ADEQUACY: ABNORMAL
BKR LAB AP GYN INTERPRETATION: ABNORMAL
BKR LAB AP HPV REFLEX: ABNORMAL
BKR LAB AP PREVIOUS ABNL DX: ABNORMAL
BKR LAB AP PREVIOUS ABNORMAL: ABNORMAL
PATH REPORT.COMMENTS IMP SPEC: ABNORMAL
PATH REPORT.RELEVANT HX SPEC: ABNORMAL

## 2021-11-02 LAB
HUMAN PAPILLOMA VIRUS 16 DNA: NEGATIVE
HUMAN PAPILLOMA VIRUS 18 DNA: NEGATIVE
HUMAN PAPILLOMA VIRUS FINAL DIAGNOSIS: ABNORMAL
HUMAN PAPILLOMA VIRUS OTHER HR: POSITIVE

## 2021-11-03 DIAGNOSIS — E66.3 OVERWEIGHT: ICD-10-CM

## 2021-11-04 PROBLEM — M25.531 RIGHT WRIST PAIN: Status: RESOLVED | Noted: 2021-09-10 | Resolved: 2021-11-04

## 2021-11-04 PROBLEM — G56.01 CARPAL TUNNEL SYNDROME OF RIGHT WRIST: Status: RESOLVED | Noted: 2021-09-10 | Resolved: 2021-11-04

## 2021-11-05 ENCOUNTER — PATIENT OUTREACH (OUTPATIENT)
Dept: ONCOLOGY | Facility: CLINIC | Age: 41
End: 2021-11-05

## 2021-11-05 NOTE — TELEPHONE ENCOUNTER
Routing refill request to provider for review/approval because:  Drug not on the [facility] refill protocol     Daniella Kang RN   [facility]

## 2021-11-05 NOTE — TELEPHONE ENCOUNTER
RN reached out to patient for pre-operative call and touch base.    RN introduced herself and the purpose of the call     Patient had no questions and concerns bout surgery other than how long she would be out of work.     This was reviewed.     RN answered all the patient questions to the best of her ability. Patient was at work so complete surgical teaching was not completed.     Surgical packet was placed in mail to patients home     Mely Maacrio RN

## 2021-11-09 DIAGNOSIS — I10 HYPERTENSION, UNSPECIFIED TYPE: ICD-10-CM

## 2021-11-09 RX ORDER — PHENTERMINE HYDROCHLORIDE 37.5 MG/1
TABLET ORAL
Qty: 30 TABLET | Refills: 0 | OUTPATIENT
Start: 2021-11-09

## 2021-11-09 NOTE — TELEPHONE ENCOUNTER
Spoke with pt, verbalized understanding and helped schedule.     Thanks,  IMANI Mccrary  [facility]      5

## 2021-11-09 NOTE — TELEPHONE ENCOUNTER
Danna Enrique APRN CNP  [facility] Rn Nurse Pool 1 hour ago (8:56 AM)     TAISHA Johnson is scheduled for surgery in 2 weeks; not a good idea to restart this medication right now. WE can follow up after surgery, or she can make an appointment with weight management after surgery,    Routing comment      Refused Prescriptions:                       Disp   Refills    phentermine (ADIPEX-P) 37.5 MG tablet [Pha*30 tab*0        Sig: TAKE 1 TABLET(37.5MG) BY MOUTH EVERY MORNING BEFORE           BREAKFAST  Refused By: DANNA ENRIQUE  Reason for Refusal: OTHER  Reason for Refusal Comment: patient is scheduled for surgery in 2 weeks    Writer called patient no answer unable to leave voice message d/t mailbox full

## 2021-11-10 RX ORDER — HYDROCHLOROTHIAZIDE 25 MG/1
TABLET ORAL
Qty: 90 TABLET | Refills: 1 | Status: SHIPPED | OUTPATIENT
Start: 2021-11-10 | End: 2022-03-28

## 2021-11-10 NOTE — PROGRESS NOTES
Preoperative Assessment Center Medication History Note    Medication history completed on November 12, 2021 by this writer. See Epic admission navigator for prior to admission medications. Operating room staff will still need to confirm medications and last dose information on day of surgery.     Medication history interview sources  Patient interview: Yes  Care Everywhere records: No  Surescripts pharmacy refill records: Yes  Other (if applicable):     Changes made to PTA medication list  Added: none  Deleted: apap, ibuprofen, metformin, naltrexone, nicotine patch, nicotine gum, nicotine lozenge, triamcinolone,   Changed: topamax dose/sig, hydrochlorothiazide, phentermine,     Additional medication history information (including reliability of information, actions taken by pharmacist):    -- not actively taking phentermine or detrol but may resume after surgery so these were left on the list.   -- patient reports only taking topiramate PRN and hasnt used in the past month.   -- No recent (within 30 days) course of antibiotics  -- No recent (within 30 days) course of systemic steroids  -- Patient declines being on any other prescription or over-the-counter medications    Prior to Admission medications    Medication Sig Last Dose Taking? Auth Provider   Emollient (AQUAPHOR ADVANCED THERAPY) OINT Externally apply topically 2 times daily as needed (itching) Taking Yes Danna Enrique APRN CNP   hydrochlorothiazide (HYDRODIURIL) 25 MG tablet TAKE 1 TABLET(25 MG) BY MOUTH DAILY  Patient taking differently: Take 25 mg by mouth every morning  Taking Yes Danna Enrique APRN CNP   Multiple Vitamin (MULTIVITAMIN ADULT) TABS Take 1 tablet by mouth every morning  Taking Yes Reported, Patient   naproxen (NAPROSYN) 500 MG tablet Take 1 tablet (500 mg) by mouth 2 times daily (with meals)  Patient taking differently: Take 500 mg by mouth 2 times daily as needed for moderate pain  Taking Yes Vipin Graham MD    senna-docusate (SENOKOT-S/PERICOLACE) 8.6-50 MG tablet Take 2 tablets by mouth 2 times daily as needed for constipation Taking Yes Екатерина Temple MD   topiramate (TOPAMAX) 100 MG tablet TAKE ONE TABLET BY MOUTH EVERY NIGHT AT BEDTIME WITH 50MG TABLET  Patient taking differently: Take 100 mg by mouth nightly as needed (takes for migraines but only when needed (will take with the 100 mg if taking for total dose of 150 mg). Reports that she hasnt taken for about a month in Nov 2021.)  Taking Yes Danna Enrique APRN CNP   topiramate (TOPAMAX) 50 MG tablet TAKE ONE TABLET BY MOUTH EVERY NIGHT AT BEDTIME WITH 100MG TABLET  Patient taking differently: Take 50 mg by mouth nightly as needed (takes for migraines but only when needed (will take with the 50 mg if taking for total dose of 150 mg). Reports that she hasnt taken for about a month in Nov 2021.)  Taking Yes Danna Enrique APRN CNP   UNABLE TO FIND every morning MEDICATION NAME: [place] Taking Yes Reported, Patient   vitamin D3 (CHOLECALCIFEROL) 2000 units (50 mcg) tablet TAKE 2 TABLETS BY MOUTH DAILY Taking Yes Danna Enrique APRN CNP   ondansetron (ZOFRAN-ODT) 4 MG ODT tab Take 4 mg by mouth  Patient not taking: Reported on 11/12/2021 Not Taking at Unknown time  Reported, Patient   phentermine (ADIPEX-P) 37.5 MG tablet Take 1 tablet (37.5 mg) by mouth every morning (before breakfast)  Patient not taking: Reported on 12/11/2020 Not Taking  Danna Enrique APRN CNP   tolterodine ER (DETROL LA) 4 MG 24 hr capsule Take 1 capsule (4 mg) by mouth daily  Patient not taking: Reported on 11/12/2021 Not Taking at Unknown time  Kirk Redd MD          Medication history completed by: Bo Briceño, [facility]

## 2021-11-12 ENCOUNTER — LAB (OUTPATIENT)
Dept: LAB | Facility: CLINIC | Age: 41
End: 2021-11-12

## 2021-11-12 ENCOUNTER — LAB (OUTPATIENT)
Dept: LAB | Facility: CLINIC | Age: 41
End: 2021-11-12
Payer: COMMERCIAL

## 2021-11-12 ENCOUNTER — OFFICE VISIT (OUTPATIENT)
Dept: SURGERY | Facility: CLINIC | Age: 41
End: 2021-11-12
Payer: COMMERCIAL

## 2021-11-12 ENCOUNTER — ANESTHESIA EVENT (OUTPATIENT)
Dept: SURGERY | Facility: CLINIC | Age: 41
End: 2021-11-12
Payer: COMMERCIAL

## 2021-11-12 ENCOUNTER — PRE VISIT (OUTPATIENT)
Dept: SURGERY | Facility: CLINIC | Age: 41
End: 2021-11-12

## 2021-11-12 VITALS
OXYGEN SATURATION: 96 % | SYSTOLIC BLOOD PRESSURE: 153 MMHG | BODY MASS INDEX: 33.31 KG/M2 | HEIGHT: 63 IN | DIASTOLIC BLOOD PRESSURE: 93 MMHG | TEMPERATURE: 97.7 F | HEART RATE: 88 BPM | WEIGHT: 188 LBS

## 2021-11-12 DIAGNOSIS — N83.292 COMPLEX CYST OF LEFT OVARY: ICD-10-CM

## 2021-11-12 DIAGNOSIS — R18.8 OTHER ASCITES: ICD-10-CM

## 2021-11-12 DIAGNOSIS — Z01.818 PREOP EXAMINATION: Primary | ICD-10-CM

## 2021-11-12 DIAGNOSIS — Z11.59 ENCOUNTER FOR SCREENING FOR OTHER VIRAL DISEASES: ICD-10-CM

## 2021-11-12 LAB
ABO/RH(D): NORMAL
AFP SERPL-MCNC: 2.5 UG/L (ref 0–8)
ALBUMIN SERPL-MCNC: 3.5 G/DL (ref 3.4–5)
ALP SERPL-CCNC: 96 U/L (ref 40–150)
ALT SERPL W P-5'-P-CCNC: 26 U/L (ref 0–50)
ANION GAP SERPL CALCULATED.3IONS-SCNC: 7 MMOL/L (ref 3–14)
ANTIBODY SCREEN: NEGATIVE
AST SERPL W P-5'-P-CCNC: 31 U/L (ref 0–45)
BASOPHILS # BLD AUTO: 0.1 10E3/UL (ref 0–0.2)
BASOPHILS NFR BLD AUTO: 1 %
BILIRUB SERPL-MCNC: 0.3 MG/DL (ref 0.2–1.3)
BUN SERPL-MCNC: 12 MG/DL (ref 7–30)
CALCIUM SERPL-MCNC: 9.5 MG/DL (ref 8.5–10.1)
CANCER AG125 SERPL-ACNC: 14 U/ML (ref 0–30)
CEA SERPL-MCNC: 0.8 UG/L (ref 0–2.5)
CHLORIDE BLD-SCNC: 106 MMOL/L (ref 94–109)
CO2 SERPL-SCNC: 28 MMOL/L (ref 20–32)
CREAT SERPL-MCNC: 0.58 MG/DL (ref 0.52–1.04)
EOSINOPHIL # BLD AUTO: 0.5 10E3/UL (ref 0–0.7)
EOSINOPHIL NFR BLD AUTO: 4 %
ERYTHROCYTE [DISTWIDTH] IN BLOOD BY AUTOMATED COUNT: 16.3 % (ref 10–15)
GFR SERPL CREATININE-BSD FRML MDRD: >90 ML/MIN/1.73M2
GLUCOSE BLD-MCNC: 91 MG/DL (ref 70–99)
HCG-TM SERPL-ACNC: <3 IU/L
HCT VFR BLD AUTO: 40.1 % (ref 35–47)
HGB BLD-MCNC: 12.6 G/DL (ref 11.7–15.7)
IMM GRANULOCYTES # BLD: 0.1 10E3/UL
IMM GRANULOCYTES NFR BLD: 1 %
LYMPHOCYTES # BLD AUTO: 3.8 10E3/UL (ref 0.8–5.3)
LYMPHOCYTES NFR BLD AUTO: 29 %
MCH RBC QN AUTO: 26.4 PG (ref 26.5–33)
MCHC RBC AUTO-ENTMCNC: 31.4 G/DL (ref 31.5–36.5)
MCV RBC AUTO: 84 FL (ref 78–100)
MONOCYTES # BLD AUTO: 0.9 10E3/UL (ref 0–1.3)
MONOCYTES NFR BLD AUTO: 7 %
NEUTROPHILS # BLD AUTO: 7.5 10E3/UL (ref 1.6–8.3)
NEUTROPHILS NFR BLD AUTO: 58 %
NRBC # BLD AUTO: 0 10E3/UL
NRBC BLD AUTO-RTO: 0 /100
PLATELET # BLD AUTO: 395 10E3/UL (ref 150–450)
POTASSIUM BLD-SCNC: 4 MMOL/L (ref 3.4–5.3)
PROT SERPL-MCNC: 7.7 G/DL (ref 6.8–8.8)
RBC # BLD AUTO: 4.77 10E6/UL (ref 3.8–5.2)
SARS-COV-2 RNA RESP QL NAA+PROBE: NEGATIVE
SODIUM SERPL-SCNC: 141 MMOL/L (ref 133–144)
SPECIMEN EXPIRATION DATE: NORMAL
WBC # BLD AUTO: 12.9 10E3/UL (ref 4–11)

## 2021-11-12 PROCEDURE — 86900 BLOOD TYPING SEROLOGIC ABO: CPT | Mod: 90 | Performed by: PATHOLOGY

## 2021-11-12 PROCEDURE — U0003 INFECTIOUS AGENT DETECTION BY NUCLEIC ACID (DNA OR RNA); SEVERE ACUTE RESPIRATORY SYNDROME CORONAVIRUS 2 (SARS-COV-2) (CORONAVIRUS DISEASE [COVID-19]), AMPLIFIED PROBE TECHNIQUE, MAKING USE OF HIGH THROUGHPUT TECHNOLOGIES AS DESCRIBED BY CMS-2020-01-R: HCPCS | Mod: 90 | Performed by: PATHOLOGY

## 2021-11-12 PROCEDURE — 82378 CARCINOEMBRYONIC ANTIGEN: CPT | Mod: 90 | Performed by: PATHOLOGY

## 2021-11-12 PROCEDURE — 84702 CHORIONIC GONADOTROPIN TEST: CPT | Mod: 90 | Performed by: PATHOLOGY

## 2021-11-12 PROCEDURE — 86850 RBC ANTIBODY SCREEN: CPT | Mod: 90 | Performed by: PATHOLOGY

## 2021-11-12 PROCEDURE — 36415 COLL VENOUS BLD VENIPUNCTURE: CPT | Performed by: PATHOLOGY

## 2021-11-12 PROCEDURE — 99204 OFFICE O/P NEW MOD 45 MIN: CPT | Performed by: CLINICAL NURSE SPECIALIST

## 2021-11-12 PROCEDURE — 86304 IMMUNOASSAY TUMOR CA 125: CPT | Mod: 90 | Performed by: PATHOLOGY

## 2021-11-12 PROCEDURE — 85025 COMPLETE CBC W/AUTO DIFF WBC: CPT | Performed by: PATHOLOGY

## 2021-11-12 PROCEDURE — 86301 IMMUNOASSAY TUMOR CA 19-9: CPT | Mod: 90 | Performed by: PATHOLOGY

## 2021-11-12 PROCEDURE — 99000 SPECIMEN HANDLING OFFICE-LAB: CPT | Performed by: PATHOLOGY

## 2021-11-12 PROCEDURE — 86901 BLOOD TYPING SEROLOGIC RH(D): CPT | Mod: 90 | Performed by: PATHOLOGY

## 2021-11-12 PROCEDURE — U0005 INFEC AGEN DETEC AMPLI PROBE: HCPCS | Mod: 90 | Performed by: PATHOLOGY

## 2021-11-12 PROCEDURE — 80053 COMPREHEN METABOLIC PANEL: CPT | Performed by: PATHOLOGY

## 2021-11-12 PROCEDURE — 82105 ALPHA-FETOPROTEIN SERUM: CPT | Mod: 90 | Performed by: PATHOLOGY

## 2021-11-12 RX ORDER — ACETAMINOPHEN 500 MG
500-1000 TABLET ORAL EVERY 6 HOURS PRN
COMMUNITY
End: 2021-11-12

## 2021-11-12 ASSESSMENT — PAIN SCALES - GENERAL: PAINLEVEL: NO PAIN (0)

## 2021-11-12 ASSESSMENT — LIFESTYLE VARIABLES: TOBACCO_USE: 1

## 2021-11-12 ASSESSMENT — MIFFLIN-ST. JEOR: SCORE: 1496.89

## 2021-11-12 NOTE — PATIENT INSTRUCTIONS
Preparing for Your Surgery      Name:  Elizabeth Gil   MRN:  [id number]   :  1980   Today's Date:  2021       Arriving for surgery:  Surgery date:  21  Arrival time:  06:00 am    Restrictions due to COVID 19:       One visitor is allowed in the Pre Op area.       When you go into surgery, one visitor is allowed to wait in the Surgery Waiting Room       (provided there is enough space to social distance).         In hospital patients are allowed 1 visitor per day       The visitor may change daily     Visiting Hours: 8 am - 8:30 pm   No ill visitors.   All visitors must wear face mask.    [facility] parking is available for anyone with mobility limitations or disabilities.  ([facility]  24 hours/ 7 days a week; [facility]  7 am- 3:30 pm, Mon- Fri)    Please come to:     [facility] [address]  [address] [place] [address]    - ? parking is available in front of the hospital      -    Please proceed to [address] on the 3rd floor. [phone]?     - ?If you are in need of directions, wheelchair or escort please stop at the Information Desk in the lobby.  Inform the information person that you are here for surgery; a wheelchair and escort to [address] will be provided.?     What can I eat or drink?  -  You may eat and drink normally for up to 8 hours before your surgery.   -  You may have clear liquids until 2 hours before surgery.     Examples of clear liquids:  Water  Clear broth  Juices (apple, white grape, white cranberry  and cider) without pulp  Noncarbonated, powder based beverages  (lemonade and Jaquan-Aid)  Sodas (Sprite, 7-Up, ginger ale and seltzer)  Coffee or tea (without milk or cream)  Gatorade    -  No Alcohol for at least 24 hours before surgery     Which medicines can I take?    Hold Aspirin for 7 days before surgery.   Hold Multivitamins for 7 days before surgery.  Hold Supplements for 7 days before surgery.  Hold  Ibuprofen (Advil, Motrin) for 1 day before surgery--unless otherwise directed by surgeon.  Hold Naproxen (Aleve) for 4 days before surgery.    -  DO NOT take these medications the day of surgery:  hydrodiuril.    -  PLEASE TAKE these medications the day of surgery:  tylelnol if needed; take other morning medications.    How do I prepare myself?  - Please take 2 showers before surgery using Scrubcare or Hibiclens soap.    Use this soap only from the neck to your toes.     Leave the soap on your skin for one minute--then rinse thoroughly.      You may use your own shampoo and conditioner; no other hair products.   - Please remove all jewelry and body piercings.  - No lotions, deodorants or fragrance.  - No makeup or fingernail polish.   - Bring your ID and insurance card.    -If you have a Deep Brain Stimulator, Spinal Cord Stimulator or any neuro stimulator device---you must bring the remote control to the hospital     - All patients are required to have a Covid-19 test within 4 days of surgery/procedure.      -Patients will be contacted by the [facility] scheduling team within 1 week of surgery to make an appointment.      - Patients may call the Scheduling team at [phone] if they have not been scheduled within 4 days of  surgery.    Questions or Concerns:    - For any questions regarding the day of surgery or your hospital stay, please contact the Pre Admission Nursing Office at [phone].       - If you have health changes between today and your surgery please call your surgeon.       For questions after surgery please call your surgeons office.

## 2021-11-12 NOTE — H&P (VIEW-ONLY)
Pre-Operative H & P     CC:  Preoperative exam to assess for increased cardiopulmonary risk while undergoing surgery and anesthesia.    Date of Encounter: 11/12/2021  Primary Care Physician:  Edwin Duff     Reason for visit:   Encounter Diagnoses   Name Primary?     Preop examination Yes     Complex cyst of left ovary        SASHA Gil is a 41 year old female who presents for pre-operative H & P in preparation for Laparoscopic left salpingo-oophorectomy, possible laparotomy, possible cancer staging, possible hysterectomy with right salpingo-oophorectomy, possible appendectomy, possible lymph nodes, biopsies  with Dr. Young on 11/16/21 at [facility].     History is obtained from the patient and chart review    Patient who presented to the ED with severe LLQ abdominal pain. An US was performed and she was found to have a left adnexal mass. After discharge, she was referred for CT scan which showed a large heterogeneous left adnexal soft tissue mass measuring 9.8 x 6.2 x 6.0 cm  with areas of central hypoattenuation and calcifications, prominent air-filled appendix and evidence of thickening along the anterior peritoneal surface and increased attenuation of the mesentery. No evidence of malignancy - most likely benign. She was referred to Dr. Young and was counseled for above procedures.    Her history is otherwise significant for obesity, HYPERTENSION, snoring, smoking, migraines, and stress incontinence.     Today patient denies fever, shortness of breath, chest pain, irregular HR, or ankle edema. She reports that she is getting over a cold which started last Thursday. No fever but has had a mild sore throat, and congested cough. She feels that she is getting better. She will have a COVID test later today.     Hx of abnormal bleeding or anti-platelet use: Denies.     Menstrual history: Patient's last menstrual period was 10/20/2021  (approximate).    Prior to Admission Medications  Current Outpatient Medications   Medication Sig Dispense Refill     Emollient (AQUAPHOR ADVANCED THERAPY) OINT Externally apply topically 2 times daily as needed (itching) 85 g 3     hydrochlorothiazide (HYDRODIURIL) 25 MG tablet TAKE 1 TABLET(25 MG) BY MOUTH DAILY (Patient taking differently: Take 25 mg by mouth every morning ) 90 tablet 1     Multiple Vitamin (MULTIVITAMIN ADULT) TABS Take 1 tablet by mouth every morning        naproxen (NAPROSYN) 500 MG tablet Take 1 tablet (500 mg) by mouth 2 times daily (with meals) (Patient taking differently: Take 500 mg by mouth 2 times daily as needed for moderate pain ) 60 tablet 3     senna-docusate (SENOKOT-S/PERICOLACE) 8.6-50 MG tablet Take 2 tablets by mouth 2 times daily as needed for constipation 60 tablet 1     topiramate (TOPAMAX) 100 MG tablet TAKE ONE TABLET BY MOUTH EVERY NIGHT AT BEDTIME WITH 50MG TABLET (Patient taking differently: Take 100 mg by mouth nightly as needed (takes for migraines but only when needed (will take with the 100 mg if taking for total dose of 150 mg). Reports that she hasnt taken for about a month in Nov 2021.) ) 30 tablet 0     topiramate (TOPAMAX) 50 MG tablet TAKE ONE TABLET BY MOUTH EVERY NIGHT AT BEDTIME WITH 100MG TABLET (Patient taking differently: Take 50 mg by mouth nightly as needed (takes for migraines but only when needed (will take with the 50 mg if taking for total dose of 150 mg). Reports that she hasnt taken for about a month in Nov 2021.) ) 30 tablet 0     UNABLE TO FIND every morning MEDICATION NAME: Sea Breen       vitamin D3 (CHOLECALCIFEROL) 2000 units (50 mcg) tablet TAKE 2 TABLETS BY MOUTH DAILY 180 tablet 2     ondansetron (ZOFRAN-ODT) 4 MG ODT tab Take 4 mg by mouth (Patient not taking: Reported on 11/12/2021)       phentermine (ADIPEX-P) 37.5 MG tablet Take 1 tablet (37.5 mg) by mouth every morning (before breakfast) (Patient not taking: Reported on 12/11/2020)  30 tablet 3     tolterodine ER (DETROL LA) 4 MG 24 hr capsule Take 1 capsule (4 mg) by mouth daily (Patient not taking: Reported on 11/12/2021) 90 capsule 1       Family History  Family History   Problem Relation Age of Onset     Thyroid Disease Mother      Heart Disease Mother      Hypertension Mother      Obesity Mother      Colon Cancer Father      Aneurysm Father      Hypertension Father      Obesity Father      Ovarian Cancer Maternal Aunt      Breast Cancer No family hx of      Uterine Cancer No family hx of        Past Medical History:   Diagnosis Date     Abnormal Pap smear of cervix 10/18/2018    10/18/18: See problem list.      Cervical high risk HPV (human papillomavirus) test positive 10/18/2018, 6/12/19    See problem list     Complex cyst of left ovary      Hypertension      Migraine       Past Surgical History:   Procedure Laterality Date     PANNICULECTOMY  2012      Allergies   Allergen Reactions     Morphine Itching      Social History     Tobacco Use     Smoking status: Current Every Day Smoker     Packs/day: 1.00     Years: 15.00     Pack years: 15.00     Types: Cigarettes     Smokeless tobacco: Never Used   Substance Use Topics     Alcohol use: No     Comment: 2-3      Wt Readings from Last 1 Encounters:   11/12/21 85.3 kg (188 lb)          ROS/MED HISTORY  The complete review of systems is negative other than noted in the HPI or here.    ENT/Pulmonary:     (+) KARY risk factors, snores loudly, observed stopped breathing, tobacco use, Current use, 15  Pack-Year Hx,  recent URI, resolving-no fever, mild sore throat, cough:     Neurologic:     (+) migraines,     Cardiovascular:     (+) hypertension-range: 130s/80s/ ----Previous cardiac testing   Echo: Date: Results:    Stress Test: Date: Results:    ECG Reviewed: Date: 5/1/21 Results:  ST, LVH, ST and T wave changes  Cath: Date: Results:   (-) taking anticoagulants/antiplatelets   METS/Exercise Tolerance: >4 METS Comment: Runs and provides cleaning  services   Hematologic:  - neg hematologic  ROS     Musculoskeletal:  - neg musculoskeletal ROS     GI/Hepatic:  - neg GI/hepatic ROS     Renal/Genitourinary:  - neg Renal ROS     Endo:     (+) Obesity,     Psychiatric/Substance Use:  - neg psychiatric ROS     Infectious Disease:  - neg infectious disease ROS     Malignancy: Comment: Complex cystic mass left ovary      Other:      (+) LMP: 10/20/21, ,           LABS:  Lab Results   Component Value Date    WBC 12.9 11/12/2021    WBC 10.0 11/05/2020     Lab Results   Component Value Date    RBC 4.77 11/12/2021    RBC 5.08 11/05/2020     Lab Results   Component Value Date    HGB 12.6 11/12/2021    HGB 13.8 11/05/2020     Lab Results   Component Value Date    HCT 40.1 11/12/2021    HCT 42.0 11/05/2020     Lab Results   Component Value Date    MCV 84 11/12/2021    MCV 83 11/05/2020     Lab Results   Component Value Date    MCH 26.4 11/12/2021    MCH 27.2 11/05/2020     Lab Results   Component Value Date    MCHC 31.4 11/12/2021    MCHC 32.9 11/05/2020     Lab Results   Component Value Date    RDW 16.3 11/12/2021    RDW 14.7 11/05/2020     Lab Results   Component Value Date     11/12/2021     11/05/2020     Last Comprehensive Metabolic Panel:  Sodium   Date Value Ref Range Status   11/12/2021 141 133 - 144 mmol/L Final   11/05/2020 136 133 - 144 mmol/L Final     Potassium   Date Value Ref Range Status   11/12/2021 4.0 3.4 - 5.3 mmol/L Final   11/05/2020 4.1 3.4 - 5.3 mmol/L Final     Chloride   Date Value Ref Range Status   11/12/2021 106 94 - 109 mmol/L Final   11/05/2020 106 94 - 109 mmol/L Final     Carbon Dioxide   Date Value Ref Range Status   11/05/2020 24 20 - 32 mmol/L Final     Carbon Dioxide (CO2)   Date Value Ref Range Status   11/12/2021 28 20 - 32 mmol/L Final     Anion Gap   Date Value Ref Range Status   11/12/2021 7 3 - 14 mmol/L Final   11/05/2020 6 3 - 14 mmol/L Final     Glucose   Date Value Ref Range Status   11/12/2021 91 70 - 99 mg/dL  "Final   11/05/2020 90 70 - 99 mg/dL Final     Urea Nitrogen   Date Value Ref Range Status   11/12/2021 12 7 - 30 mg/dL Final   11/05/2020 8 7 - 30 mg/dL Final     Creatinine   Date Value Ref Range Status   11/12/2021 0.58 0.52 - 1.04 mg/dL Final   11/05/2020 0.66 0.52 - 1.04 mg/dL Final     GFR Estimate   Date Value Ref Range Status   11/12/2021 >90 >60 mL/min/1.73m2 Final     Comment:     As of July 11, 2021, eGFR is calculated by the CKD-EPI creatinine equation, without race adjustment. eGFR can be influenced by muscle mass, exercise, and diet. The reported eGFR is an estimation only and is only applicable if the renal function is stable.   11/05/2020 >90 >60 mL/min/[1.73_m2] Final     Comment:     Non  GFR Calc  Starting 12/18/2018, serum creatinine based estimated GFR (eGFR) will be   calculated using the Chronic Kidney Disease Epidemiology Collaboration   (CKD-EPI) equation.       Calcium   Date Value Ref Range Status   11/12/2021 9.5 8.5 - 10.1 mg/dL Final   11/05/2020 9.0 8.5 - 10.1 mg/dL Final     Lab Results   Component Value Date    AST 31 11/12/2021    AST 17 11/05/2020     Lab Results   Component Value Date    ALT 26 11/12/2021    ALT 22 11/05/2020     No results found for: BILICONJ   Lab Results   Component Value Date    BILITOTAL 0.3 11/12/2021    BILITOTAL 0.2 11/05/2020     Lab Results   Component Value Date    ALBUMIN 3.5 11/12/2021    ALBUMIN 3.6 11/05/2020     Lab Results   Component Value Date    PROTTOTAL 7.7 11/12/2021    PROTTOTAL 7.4 11/05/2020      Lab Results   Component Value Date    ALKPHOS 96 11/12/2021    ALKPHOS 75 11/05/2020       BP (!) 153/93 (BP Location: Right arm, Patient Position: Chair, Cuff Size: Adult Large)   Pulse 88   Temp 97.7  F (36.5  C) (Oral)   Ht 1.6 m (5' 3\")   Wt 85.3 kg (188 lb)   LMP 10/20/2021 (Approximate)   SpO2 96%   Breastfeeding No   BMI 33.30 kg/m           Physical Exam  Constitutional: Awake, alert, cooperative, no apparent " distress, and appears stated age.  Eyes: Pupils equal, round and reactive to light, extra ocular muscles intact, sclera clear, conjunctiva normal. Glasses on.  HENT: Normocephalic, oral pharynx with moist mucus membranes, mild erythema, good dentition. No goiter appreciated.   Respiratory: Clear to auscultation bilaterally, no crackles or wheezing. Occasional congested cough, lungs clear completely with cough. No obvious dyspnea.   Cardiovascular: Regular rate and rhythm, normal S1 and S2, and no murmur noted.  Carotids +2, no bruits. No edema. Palpable pulses to radial  DP and PT arteries.   GI: Normal bowel sounds, soft, non-distended, non-tender, no masses palpated, no hepatosplenomegaly.  Surgical scars: lower abdomen, well healed.   Lymph/Hematologic: No cervical lymphadenopathy and no supraclavicular lymphadenopathy.  Genitourinary: Deferred  Skin: Warm and dry.  No rashes at anticipated surgical site.   Musculoskeletal: Full ROM of neck. There is no redness, warmth, or swelling of the joints. Gross motor strength is normal.    Neurologic: Awake, alert, oriented to name, place and time. Cranial nerves II-XII are grossly intact. Gait is normal.   Neuropsychiatric: Calm, cooperative. Normal affect.     PRIOR LABS/DIAGNOSTIC STUDIES:   All labs and imaging personally reviewed     EKG 5/1/21 Sinus tachycardia, LVH, ST and T changes, nonspecific      CT Abd/pelvis 10/18/21                                                   Impression: Large heterogeneous left adnexal mass with small amount of    possible intralesional fat (although not clearly macroscopic fat by    Hounsfield units) and dystrophic calcifications, potentially    representing ovarian teratoma versus other ovarian mass. Additional    consideration includes intermittent ovarian torsion given amount of    free fluid in the abdomen and pelvis, possibly due to the adnexal mass    as a lead point. Differential considerations for high attenuating    ascites  would include hemoperitoneum however mucinous debris filled    ascites could also have this appearance on CT and would depend on the    pathology of the mass. Of note, some of the fluid in the right flank    region has a lobulated appearance which can be seen with malignant    ascites rather than complex simple fluid. Increased attenuation of the    mesentery with some peritoneal thickening could be reactive however    differential considerations may include other etiologies including    peritoneal disease, also depending on the definitive pathology of the    pelvic mass. Of note, negative beta hCG noted.         Correlation with patient's presentation and symptoms in addition to    further gynecologic workup recommended.         US Pelvis 10/16/21    IMPRESSION:     1. Complex left adnexal lesion measuring approximately 9.0 x 4.4 x 4.2    cm. Differential includes torsed left ovary, tubo-ovarian abscess,    ruptured ectopic pregnancy, ovarian mass. Recommend OB/GYN    consultation.     2. Normal appearance of the right ovary, without evidence of ovarian    torsion.         ED US Cardiac 5/1/21    Impression: Grossly preserved left ventricular systolic function, No  pericardial effusion identified, No right ventricular dialtion identified  and Technically limited study (ultrasound contrast material not  administered)     The patient's records and results personally reviewed by this provider.     Outside records reviewed from: care everywhere    ASSESSMENT and PLAN  Elizabeth Gil is a 41 year old female who presents for pre-operative H & P in preparation for Laparoscopic left salpingo-oophorectomy, possible laparotomy, possible cancer staging, possible hysterectomy with right salpingo-oophorectomy, possible appendectomy, possible lymph nodes, biopsies  with Dr. Young on 11/16/21 at [facility].   RCRI: 0.9% risk of serious cardiac event  VTE risk: 0.26-1.8%  KARY:  3/8=Intermediate risk  PONV: 2.    --Complex cyst of left ovary with above procedure planned.   --No history of problems with anesthesia.  --HYPERTENSION. BPs at home 130s/80s. Will hold hydrochlorothiazide on DOS. No other cardiac history, symptoms or meds. EKG above. Good exercise tolerance.   --Current smoker 1 PPD X 15 years. URI, resolving, no fever, mild sore throat, congested cough. Lungs CTA with cough. Denies shortness of breath. Intermediate risk for KARY. Snores. COVID testing today.  --Migraines. Topamax prn, rare use.  --Stress incontinence.   --Type and screen drawn today.       Patient was discussed with Dr García.       The patient is optimized and acceptable candidate for proposed procedure. Arrival time, NPO, shower and medication instructions provided by nursing staff today.      On the day of service:     Prep time: 7 minutes (Additional prep completed day before visit)  Visit time: 10 minutes  Documentation time: 37 minutes  ------------------------------------------  Total time: 54 minutes      XIN Pringle CNS  Preoperative Assessment Center  [facility]  Phone: [phone]  Fax: [phone]

## 2021-11-12 NOTE — H&P
Pre-Operative H & P     CC:  Preoperative exam to assess for increased cardiopulmonary risk while undergoing surgery and anesthesia.    Date of Encounter: 11/12/2021  Primary Care Physician:  Edwin Duff     Reason for visit:   Encounter Diagnoses   Name Primary?     Preop examination Yes     Complex cyst of left ovary        SASHA Gil is a 41 year old female who presents for pre-operative H & P in preparation for Laparoscopic left salpingo-oophorectomy, possible laparotomy, possible cancer staging, possible hysterectomy with right salpingo-oophorectomy, possible appendectomy, possible lymph nodes, biopsies  with Dr. Young on 11/16/21 at [facility].     History is obtained from the patient and chart review    Patient who presented to the ED with severe LLQ abdominal pain. An US was performed and she was found to have a left adnexal mass. After discharge, she was referred for CT scan which showed a large heterogeneous left adnexal soft tissue mass measuring 9.8 x 6.2 x 6.0 cm  with areas of central hypoattenuation and calcifications, prominent air-filled appendix and evidence of thickening along the anterior peritoneal surface and increased attenuation of the mesentery. No evidence of malignancy - most likely benign. She was referred to Dr. Young and was counseled for above procedures.    Her history is otherwise significant for obesity, HYPERTENSION, snoring, smoking, migraines, and stress incontinence.     Today patient denies fever, shortness of breath, chest pain, irregular HR, or ankle edema. She reports that she is getting over a cold which started last Thursday. No fever but has had a mild sore throat, and congested cough. She feels that she is getting better. She will have a COVID test later today.     Hx of abnormal bleeding or anti-platelet use: Denies.     Menstrual history: Patient's last menstrual period was 10/20/2021  (approximate).    Prior to Admission Medications  Current Outpatient Medications   Medication Sig Dispense Refill     Emollient (AQUAPHOR ADVANCED THERAPY) OINT Externally apply topically 2 times daily as needed (itching) 85 g 3     hydrochlorothiazide (HYDRODIURIL) 25 MG tablet TAKE 1 TABLET(25 MG) BY MOUTH DAILY (Patient taking differently: Take 25 mg by mouth every morning ) 90 tablet 1     Multiple Vitamin (MULTIVITAMIN ADULT) TABS Take 1 tablet by mouth every morning        naproxen (NAPROSYN) 500 MG tablet Take 1 tablet (500 mg) by mouth 2 times daily (with meals) (Patient taking differently: Take 500 mg by mouth 2 times daily as needed for moderate pain ) 60 tablet 3     senna-docusate (SENOKOT-S/PERICOLACE) 8.6-50 MG tablet Take 2 tablets by mouth 2 times daily as needed for constipation 60 tablet 1     topiramate (TOPAMAX) 100 MG tablet TAKE ONE TABLET BY MOUTH EVERY NIGHT AT BEDTIME WITH 50MG TABLET (Patient taking differently: Take 100 mg by mouth nightly as needed (takes for migraines but only when needed (will take with the 100 mg if taking for total dose of 150 mg). Reports that she hasnt taken for about a month in Nov 2021.) ) 30 tablet 0     topiramate (TOPAMAX) 50 MG tablet TAKE ONE TABLET BY MOUTH EVERY NIGHT AT BEDTIME WITH 100MG TABLET (Patient taking differently: Take 50 mg by mouth nightly as needed (takes for migraines but only when needed (will take with the 50 mg if taking for total dose of 150 mg). Reports that she hasnt taken for about a month in Nov 2021.) ) 30 tablet 0     UNABLE TO FIND every morning MEDICATION NAME: Sea Breen       vitamin D3 (CHOLECALCIFEROL) 2000 units (50 mcg) tablet TAKE 2 TABLETS BY MOUTH DAILY 180 tablet 2     ondansetron (ZOFRAN-ODT) 4 MG ODT tab Take 4 mg by mouth (Patient not taking: Reported on 11/12/2021)       phentermine (ADIPEX-P) 37.5 MG tablet Take 1 tablet (37.5 mg) by mouth every morning (before breakfast) (Patient not taking: Reported on 12/11/2020)  30 tablet 3     tolterodine ER (DETROL LA) 4 MG 24 hr capsule Take 1 capsule (4 mg) by mouth daily (Patient not taking: Reported on 11/12/2021) 90 capsule 1       Family History  Family History   Problem Relation Age of Onset     Thyroid Disease Mother      Heart Disease Mother      Hypertension Mother      Obesity Mother      Colon Cancer Father      Aneurysm Father      Hypertension Father      Obesity Father      Ovarian Cancer Maternal Aunt      Breast Cancer No family hx of      Uterine Cancer No family hx of        Past Medical History:   Diagnosis Date     Abnormal Pap smear of cervix 10/18/2018    10/18/18: See problem list.      Cervical high risk HPV (human papillomavirus) test positive 10/18/2018, 6/12/19    See problem list     Complex cyst of left ovary      Hypertension      Migraine       Past Surgical History:   Procedure Laterality Date     PANNICULECTOMY  2012      Allergies   Allergen Reactions     Morphine Itching      Social History     Tobacco Use     Smoking status: Current Every Day Smoker     Packs/day: 1.00     Years: 15.00     Pack years: 15.00     Types: Cigarettes     Smokeless tobacco: Never Used   Substance Use Topics     Alcohol use: No     Comment: 2-3      Wt Readings from Last 1 Encounters:   11/12/21 85.3 kg (188 lb)          ROS/MED HISTORY  The complete review of systems is negative other than noted in the HPI or here.    ENT/Pulmonary:     (+) KARY risk factors, snores loudly, observed stopped breathing, tobacco use, Current use, 15  Pack-Year Hx,  recent URI, resolving-no fever, mild sore throat, cough:     Neurologic:     (+) migraines,     Cardiovascular:     (+) hypertension-range: 130s/80s/ ----Previous cardiac testing   Echo: Date: Results:    Stress Test: Date: Results:    ECG Reviewed: Date: 5/1/21 Results:  ST, LVH, ST and T wave changes  Cath: Date: Results:   (-) taking anticoagulants/antiplatelets   METS/Exercise Tolerance: >4 METS Comment: Runs and provides cleaning  services   Hematologic:  - neg hematologic  ROS     Musculoskeletal:  - neg musculoskeletal ROS     GI/Hepatic:  - neg GI/hepatic ROS     Renal/Genitourinary:  - neg Renal ROS     Endo:     (+) Obesity,     Psychiatric/Substance Use:  - neg psychiatric ROS     Infectious Disease:  - neg infectious disease ROS     Malignancy: Comment: Complex cystic mass left ovary      Other:      (+) LMP: 10/20/21, ,           LABS:  Lab Results   Component Value Date    WBC 12.9 11/12/2021    WBC 10.0 11/05/2020     Lab Results   Component Value Date    RBC 4.77 11/12/2021    RBC 5.08 11/05/2020     Lab Results   Component Value Date    HGB 12.6 11/12/2021    HGB 13.8 11/05/2020     Lab Results   Component Value Date    HCT 40.1 11/12/2021    HCT 42.0 11/05/2020     Lab Results   Component Value Date    MCV 84 11/12/2021    MCV 83 11/05/2020     Lab Results   Component Value Date    MCH 26.4 11/12/2021    MCH 27.2 11/05/2020     Lab Results   Component Value Date    MCHC 31.4 11/12/2021    MCHC 32.9 11/05/2020     Lab Results   Component Value Date    RDW 16.3 11/12/2021    RDW 14.7 11/05/2020     Lab Results   Component Value Date     11/12/2021     11/05/2020     Last Comprehensive Metabolic Panel:  Sodium   Date Value Ref Range Status   11/12/2021 141 133 - 144 mmol/L Final   11/05/2020 136 133 - 144 mmol/L Final     Potassium   Date Value Ref Range Status   11/12/2021 4.0 3.4 - 5.3 mmol/L Final   11/05/2020 4.1 3.4 - 5.3 mmol/L Final     Chloride   Date Value Ref Range Status   11/12/2021 106 94 - 109 mmol/L Final   11/05/2020 106 94 - 109 mmol/L Final     Carbon Dioxide   Date Value Ref Range Status   11/05/2020 24 20 - 32 mmol/L Final     Carbon Dioxide (CO2)   Date Value Ref Range Status   11/12/2021 28 20 - 32 mmol/L Final     Anion Gap   Date Value Ref Range Status   11/12/2021 7 3 - 14 mmol/L Final   11/05/2020 6 3 - 14 mmol/L Final     Glucose   Date Value Ref Range Status   11/12/2021 91 70 - 99 mg/dL  "Final   11/05/2020 90 70 - 99 mg/dL Final     Urea Nitrogen   Date Value Ref Range Status   11/12/2021 12 7 - 30 mg/dL Final   11/05/2020 8 7 - 30 mg/dL Final     Creatinine   Date Value Ref Range Status   11/12/2021 0.58 0.52 - 1.04 mg/dL Final   11/05/2020 0.66 0.52 - 1.04 mg/dL Final     GFR Estimate   Date Value Ref Range Status   11/12/2021 >90 >60 mL/min/1.73m2 Final     Comment:     As of July 11, 2021, eGFR is calculated by the CKD-EPI creatinine equation, without race adjustment. eGFR can be influenced by muscle mass, exercise, and diet. The reported eGFR is an estimation only and is only applicable if the renal function is stable.   11/05/2020 >90 >60 mL/min/[1.73_m2] Final     Comment:     Non  GFR Calc  Starting 12/18/2018, serum creatinine based estimated GFR (eGFR) will be   calculated using the Chronic Kidney Disease Epidemiology Collaboration   (CKD-EPI) equation.       Calcium   Date Value Ref Range Status   11/12/2021 9.5 8.5 - 10.1 mg/dL Final   11/05/2020 9.0 8.5 - 10.1 mg/dL Final     Lab Results   Component Value Date    AST 31 11/12/2021    AST 17 11/05/2020     Lab Results   Component Value Date    ALT 26 11/12/2021    ALT 22 11/05/2020     No results found for: BILICONJ   Lab Results   Component Value Date    BILITOTAL 0.3 11/12/2021    BILITOTAL 0.2 11/05/2020     Lab Results   Component Value Date    ALBUMIN 3.5 11/12/2021    ALBUMIN 3.6 11/05/2020     Lab Results   Component Value Date    PROTTOTAL 7.7 11/12/2021    PROTTOTAL 7.4 11/05/2020      Lab Results   Component Value Date    ALKPHOS 96 11/12/2021    ALKPHOS 75 11/05/2020       BP (!) 153/93 (BP Location: Right arm, Patient Position: Chair, Cuff Size: Adult Large)   Pulse 88   Temp 97.7  F (36.5  C) (Oral)   Ht 1.6 m (5' 3\")   Wt 85.3 kg (188 lb)   LMP 10/20/2021 (Approximate)   SpO2 96%   Breastfeeding No   BMI 33.30 kg/m           Physical Exam  Constitutional: Awake, alert, cooperative, no apparent " distress, and appears stated age.  Eyes: Pupils equal, round and reactive to light, extra ocular muscles intact, sclera clear, conjunctiva normal. Glasses on.  HENT: Normocephalic, oral pharynx with moist mucus membranes, mild erythema, good dentition. No goiter appreciated.   Respiratory: Clear to auscultation bilaterally, no crackles or wheezing. Occasional congested cough, lungs clear completely with cough. No obvious dyspnea.   Cardiovascular: Regular rate and rhythm, normal S1 and S2, and no murmur noted.  Carotids +2, no bruits. No edema. Palpable pulses to radial  DP and PT arteries.   GI: Normal bowel sounds, soft, non-distended, non-tender, no masses palpated, no hepatosplenomegaly.  Surgical scars: lower abdomen, well healed.   Lymph/Hematologic: No cervical lymphadenopathy and no supraclavicular lymphadenopathy.  Genitourinary: Deferred  Skin: Warm and dry.  No rashes at anticipated surgical site.   Musculoskeletal: Full ROM of neck. There is no redness, warmth, or swelling of the joints. Gross motor strength is normal.    Neurologic: Awake, alert, oriented to name, place and time. Cranial nerves II-XII are grossly intact. Gait is normal.   Neuropsychiatric: Calm, cooperative. Normal affect.     PRIOR LABS/DIAGNOSTIC STUDIES:   All labs and imaging personally reviewed     EKG 5/1/21 Sinus tachycardia, LVH, ST and T changes, nonspecific      CT Abd/pelvis 10/18/21                                                   Impression: Large heterogeneous left adnexal mass with small amount of    possible intralesional fat (although not clearly macroscopic fat by    Hounsfield units) and dystrophic calcifications, potentially    representing ovarian teratoma versus other ovarian mass. Additional    consideration includes intermittent ovarian torsion given amount of    free fluid in the abdomen and pelvis, possibly due to the adnexal mass    as a lead point. Differential considerations for high attenuating    ascites  would include hemoperitoneum however mucinous debris filled    ascites could also have this appearance on CT and would depend on the    pathology of the mass. Of note, some of the fluid in the right flank    region has a lobulated appearance which can be seen with malignant    ascites rather than complex simple fluid. Increased attenuation of the    mesentery with some peritoneal thickening could be reactive however    differential considerations may include other etiologies including    peritoneal disease, also depending on the definitive pathology of the    pelvic mass. Of note, negative beta hCG noted.         Correlation with patient's presentation and symptoms in addition to    further gynecologic workup recommended.         US Pelvis 10/16/21    IMPRESSION:     1. Complex left adnexal lesion measuring approximately 9.0 x 4.4 x 4.2    cm. Differential includes torsed left ovary, tubo-ovarian abscess,    ruptured ectopic pregnancy, ovarian mass. Recommend OB/GYN    consultation.     2. Normal appearance of the right ovary, without evidence of ovarian    torsion.         ED US Cardiac 5/1/21    Impression: Grossly preserved left ventricular systolic function, No  pericardial effusion identified, No right ventricular dialtion identified  and Technically limited study (ultrasound contrast material not  administered)     The patient's records and results personally reviewed by this provider.     Outside records reviewed from: care everywhere    ASSESSMENT and PLAN  Elizabeth Gil is a 41 year old female who presents for pre-operative H & P in preparation for Laparoscopic left salpingo-oophorectomy, possible laparotomy, possible cancer staging, possible hysterectomy with right salpingo-oophorectomy, possible appendectomy, possible lymph nodes, biopsies  with Dr. Young on 11/16/21 at [facility].   RCRI: 0.9% risk of serious cardiac event  VTE risk: 0.26-1.8%  KARY:  3/8=Intermediate risk  PONV: 2.    --Complex cyst of left ovary with above procedure planned.   --No history of problems with anesthesia.  --HYPERTENSION. BPs at home 130s/80s. Will hold hydrochlorothiazide on DOS. No other cardiac history, symptoms or meds. EKG above. Good exercise tolerance.   --Current smoker 1 PPD X 15 years. URI, resolving, no fever, mild sore throat, congested cough. Lungs CTA with cough. Denies shortness of breath. Intermediate risk for KARY. Snores. COVID testing today.  --Migraines. Topamax prn, rare use.  --Stress incontinence.   --Type and screen drawn today.       Patient was discussed with Dr García.       The patient is optimized and acceptable candidate for proposed procedure. Arrival time, NPO, shower and medication instructions provided by nursing staff today.      On the day of service:     Prep time: 7 minutes (Additional prep completed day before visit)  Visit time: 10 minutes  Documentation time: 37 minutes  ------------------------------------------  Total time: 54 minutes      XIN Pringle CNS  Preoperative Assessment Center  [facility]  Phone: [phone]  Fax: [phone]

## 2021-11-12 NOTE — ANESTHESIA PREPROCEDURE EVALUATION
Anesthesia Pre-Procedure Evaluation    Patient: Elizabeth Gil   MRN: [id number] : 1980        Preoperative Diagnosis: Complex cyst of left ovary [N83.292]  Other ascites [R18.8]    Procedure : Procedure(s):  Laparoscopic left salpingo-oophorectomy  possible laparotomy, possible cancer staging, possible hysterectomy with right salpingo-oophorectomy  possible appendectomy, possible lymph nodes, biopsies  PAC EVALUATION       Past Medical History:   Diagnosis Date     Abnormal Pap smear of cervix 10/18/2018    10/18/18: See problem list.      Cervical high risk HPV (human papillomavirus) test positive 10/18/2018, 19    See problem list     Complex cyst of left ovary      Hypertension      Migraine       Past Surgical History:   Procedure Laterality Date     PANNICULECTOMY        Allergies   Allergen Reactions     Morphine Itching      Social History     Tobacco Use     Smoking status: Current Every Day Smoker     Packs/day: 1.00     Years: 15.00     Pack years: 15.00     Types: Cigarettes     Smokeless tobacco: Never Used   Substance Use Topics     Alcohol use: No     Comment: 2-3      Wt Readings from Last 1 Encounters:   21 85.3 kg (188 lb)        Anesthesia Evaluation   Pt has had prior anesthetic. Type: General.    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   Hematologic:  - neg hematologic  ROS     Musculoskeletal:  -  neg musculoskeletal ROS     GI/Hepatic:  - neg GI/hepatic ROS     Renal/Genitourinary:  - neg Renal ROS     Endo:     (+) Obesity,     Psychiatric/Substance Use:  - neg psychiatric ROS     Infectious Disease:  - neg infectious disease ROS     Malignancy: Comment: Complex cystic mass left ovary      Other:      (+) LMP: 10/20/21, ,         Physical Exam    Airway        Mallampati: I   TM distance: > 3 FB   Neck ROM: full   Mouth opening: > 3 cm    Respiratory Devices and Support         Dental  no notable dental history         Cardiovascular          Rhythm and rate: regular and normal     Pulmonary       Comment: Congested cough, lungs clear with coughing    breath sounds clear to auscultation           OUTSIDE LABS:  CBC:   Lab Results   Component Value Date    WBC 13.9 (H) 10/16/2021    WBC 10.0 11/05/2020    HGB 12.7 10/16/2021    HGB 13.8 11/05/2020    HCT 39.0 10/16/2021    HCT 42.0 11/05/2020     10/16/2021     11/05/2020     BMP:   Lab Results   Component Value Date     10/16/2021     11/05/2020    POTASSIUM 3.8 10/16/2021    POTASSIUM 4.1 11/05/2020    CHLORIDE 106 10/16/2021    CHLORIDE 106 11/05/2020    CO2 25 10/16/2021    CO2 24 11/05/2020    BUN 9 10/16/2021    BUN 8 11/05/2020    CR 0.65 10/16/2021    CR 0.66 11/05/2020     (H) 10/16/2021    GLC 90 11/05/2020     COAGS: No results found for: PTT, INR, FIBR  POC:   Lab Results   Component Value Date    HCGS Negative 10/16/2021     HEPATIC:   Lab Results   Component Value Date    ALBUMIN 3.5 10/16/2021    PROTTOTAL 7.5 10/16/2021    ALT 25 10/16/2021    AST 12 10/16/2021    ALKPHOS 65 10/16/2021    BILITOTAL 0.5 10/16/2021     OTHER:   Lab Results   Component Value Date    A1C 5.6 11/05/2020    MANDEEP 9.1 10/16/2021    LIPASE 84 10/16/2021    TSH 1.81 11/05/2020             PAC Discussion and Assessment    ASA Classification: 3  Case is suitable for: [facility]  Anesthetic techniques and relevant risks discussed:  GA  Invasive monitoring and risk discussed: No    Possibility and Risk of blood transfusion discussed: Yes            PAC Resident/NP Anesthesia Assessment: ASSESSMENT and PLAN  Elizabeth Gil is a 41 year old female who presents for pre-operative H & P in preparation for Laparoscopic left salpingo-oophorectomy, possible laparotomy, possible cancer staging, possible hysterectomy with right salpingo-oophorectomy, possible appendectomy, possible lymph nodes, biopsies  with Dr. Young on 11/16/21 at [facility].   RCRI: 0.9% risk of serious cardiac event  VTE risk: 0.26-1.8%  KARY: 3/8=Intermediate risk  PONV: 2.    --Complex cyst of left ovary with above procedure planned.   --No history of problems with anesthesia.  --HYPERTENSION. BPs at home 130s/80s. Will hold hydrochlorothiazide on DOS. No other cardiac history, symptoms or meds. EKG above. Good exercise tolerance.   --Current smoker 1 PPD X 15 years. URI, resolving, no fever, mild sore throat, congested cough. Lungs CTA with cough. Denies shortness of breath. Intermediate risk for KARY. Snores. COVID testing today.  --Migraines. Topamax prn, rare use.  --Stress incontinence. Not taking Detrol at this time.   --Type and screen drawn today.       Patient was discussed with Dr García.       The patient is optimized and acceptable candidate for proposed procedure. Arrival time, NPO, shower and medication instructions provided by nursing staff today.    Reviewed and Signed by PAC Mid-Level Provider/Resident  Mid-Level Provider/Resident: XIN Galarza, CNS  Date: 11/12/21  Time: 9:00am                               XIN Pringle CNS

## 2021-11-13 LAB — CANCER AG19-9 SERPL IA-ACNC: 9 U/ML

## 2021-11-15 RX ORDER — NALOXONE HYDROCHLORIDE 0.4 MG/ML
0.2 INJECTION, SOLUTION INTRAMUSCULAR; INTRAVENOUS; SUBCUTANEOUS
Status: CANCELLED | OUTPATIENT
Start: 2021-11-15

## 2021-11-15 RX ORDER — NALOXONE HYDROCHLORIDE 0.4 MG/ML
0.4 INJECTION, SOLUTION INTRAMUSCULAR; INTRAVENOUS; SUBCUTANEOUS
Status: CANCELLED | OUTPATIENT
Start: 2021-11-15

## 2021-11-16 ENCOUNTER — ANESTHESIA (OUTPATIENT)
Dept: SURGERY | Facility: CLINIC | Age: 41
End: 2021-11-16
Payer: COMMERCIAL

## 2021-11-16 ENCOUNTER — HOSPITAL ENCOUNTER (OUTPATIENT)
Facility: CLINIC | Age: 41
Discharge: HOME | End: 2021-11-16
Attending: OBSTETRICS & GYNECOLOGY | Admitting: OBSTETRICS & GYNECOLOGY
Payer: COMMERCIAL

## 2021-11-16 VITALS
SYSTOLIC BLOOD PRESSURE: 153 MMHG | TEMPERATURE: 98.1 F | HEIGHT: 63 IN | DIASTOLIC BLOOD PRESSURE: 85 MMHG | OXYGEN SATURATION: 96 % | WEIGHT: 192.68 LBS | RESPIRATION RATE: 16 BRPM | BODY MASS INDEX: 34.14 KG/M2 | HEART RATE: 63 BPM

## 2021-11-16 DIAGNOSIS — N83.292 COMPLEX CYST OF LEFT OVARY: ICD-10-CM

## 2021-11-16 DIAGNOSIS — R18.8 OTHER ASCITES: ICD-10-CM

## 2021-11-16 LAB
ABO/RH(D): NORMAL
ANTIBODY SCREEN: NEGATIVE
B-HCG SERPL-ACNC: <1 IU/L (ref 0–5)
BASOPHILS # BLD AUTO: 0.1 10E3/UL (ref 0–0.2)
BASOPHILS NFR BLD AUTO: 0 %
EOSINOPHIL # BLD AUTO: 0.5 10E3/UL (ref 0–0.7)
EOSINOPHIL NFR BLD AUTO: 5 %
ERYTHROCYTE [DISTWIDTH] IN BLOOD BY AUTOMATED COUNT: 16.8 % (ref 10–15)
GLUCOSE BLDC GLUCOMTR-MCNC: 94 MG/DL (ref 70–99)
HCT VFR BLD AUTO: 39.3 % (ref 35–47)
HGB BLD-MCNC: 12.1 G/DL (ref 11.7–15.7)
IMM GRANULOCYTES # BLD: 0.1 10E3/UL
IMM GRANULOCYTES NFR BLD: 1 %
LYMPHOCYTES # BLD AUTO: 4.8 10E3/UL (ref 0.8–5.3)
LYMPHOCYTES NFR BLD AUTO: 41 %
MCH RBC QN AUTO: 26.7 PG (ref 26.5–33)
MCHC RBC AUTO-ENTMCNC: 30.8 G/DL (ref 31.5–36.5)
MCV RBC AUTO: 87 FL (ref 78–100)
MONOCYTES # BLD AUTO: 1.2 10E3/UL (ref 0–1.3)
MONOCYTES NFR BLD AUTO: 10 %
NEUTROPHILS # BLD AUTO: 5 10E3/UL (ref 1.6–8.3)
NEUTROPHILS NFR BLD AUTO: 43 %
NRBC # BLD AUTO: 0 10E3/UL
NRBC BLD AUTO-RTO: 0 /100
PLATELET # BLD AUTO: 356 10E3/UL (ref 150–450)
RBC # BLD AUTO: 4.54 10E6/UL (ref 3.8–5.2)
SPECIMEN EXPIRATION DATE: NORMAL
WBC # BLD AUTO: 11.5 10E3/UL (ref 4–11)

## 2021-11-16 PROCEDURE — 85025 COMPLETE CBC W/AUTO DIFF WBC: CPT | Performed by: ANESTHESIOLOGY

## 2021-11-16 PROCEDURE — 250N000013 HC RX MED GY IP 250 OP 250 PS 637: Performed by: OBSTETRICS & GYNECOLOGY

## 2021-11-16 PROCEDURE — 84702 CHORIONIC GONADOTROPIN TEST: CPT | Performed by: ANESTHESIOLOGY

## 2021-11-16 PROCEDURE — 999N000141 HC STATISTIC PRE-PROCEDURE NURSING ASSESSMENT: Performed by: OBSTETRICS & GYNECOLOGY

## 2021-11-16 PROCEDURE — 36415 COLL VENOUS BLD VENIPUNCTURE: CPT | Performed by: ANESTHESIOLOGY

## 2021-11-16 PROCEDURE — 999N000004 HC CANCELLED SURGERY UP TO 46-60 MINS: Performed by: OBSTETRICS & GYNECOLOGY

## 2021-11-16 PROCEDURE — 86900 BLOOD TYPING SEROLOGIC ABO: CPT | Performed by: OBSTETRICS & GYNECOLOGY

## 2021-11-16 RX ORDER — LIDOCAINE 40 MG/G
CREAM TOPICAL
Status: DISCONTINUED | OUTPATIENT
Start: 2021-11-16 | End: 2021-11-17 | Stop reason: HOSPADM

## 2021-11-16 RX ORDER — CEFAZOLIN SODIUM 2 G/100ML
2 INJECTION, SOLUTION INTRAVENOUS
Status: DISCONTINUED | OUTPATIENT
Start: 2021-11-16 | End: 2021-11-17 | Stop reason: HOSPADM

## 2021-11-16 RX ORDER — HEPARIN SODIUM 5000 [USP'U]/.5ML
5000 INJECTION, SOLUTION INTRAVENOUS; SUBCUTANEOUS
Status: DISCONTINUED | OUTPATIENT
Start: 2021-11-16 | End: 2021-11-17 | Stop reason: HOSPADM

## 2021-11-16 RX ORDER — FENTANYL CITRATE 50 UG/ML
25-50 INJECTION, SOLUTION INTRAMUSCULAR; INTRAVENOUS
Status: DISCONTINUED | OUTPATIENT
Start: 2021-11-16 | End: 2021-11-17 | Stop reason: HOSPADM

## 2021-11-16 RX ORDER — ACETAMINOPHEN 325 MG/1
975 TABLET ORAL ONCE
Status: COMPLETED | OUTPATIENT
Start: 2021-11-16 | End: 2021-11-16

## 2021-11-16 RX ORDER — CEFAZOLIN SODIUM 2 G/100ML
2 INJECTION, SOLUTION INTRAVENOUS SEE ADMIN INSTRUCTIONS
Status: DISCONTINUED | OUTPATIENT
Start: 2021-11-16 | End: 2021-11-17 | Stop reason: HOSPADM

## 2021-11-16 RX ORDER — FLUMAZENIL 0.1 MG/ML
0.2 INJECTION, SOLUTION INTRAVENOUS
Status: DISCONTINUED | OUTPATIENT
Start: 2021-11-16 | End: 2021-11-17 | Stop reason: HOSPADM

## 2021-11-16 RX ADMIN — ACETAMINOPHEN 975 MG: 325 TABLET, FILM COATED ORAL at 07:43

## 2021-11-16 ASSESSMENT — ACTIVITIES OF DAILY LIVING (ADL)
ADLS_ACUITY_SCORE: 3
ADLS_ACUITY_SCORE: 1
ADLS_ACUITY_SCORE: 3

## 2021-11-16 ASSESSMENT — MIFFLIN-ST. JEOR: SCORE: 1518.13

## 2021-11-16 NOTE — PROGRESS NOTES
Patient had cream at 0400 -  Surgeon unable to wait and room fully booked. Risks discussed, NPO guidelines, procedure rescheduled and discussed with patient.  Elizabeth Hilton MD

## 2021-11-16 NOTE — OR NURSING
Patient procedure cancelled for today (11/16). Dr Young's nurse will give the patient a call to reschedule in the coming weeks. They are planning on doing the procedure before Thanksgiving.

## 2021-11-17 ASSESSMENT — ACTIVITIES OF DAILY LIVING (ADL)
ADLS_ACUITY_SCORE: 3

## 2021-11-19 ENCOUNTER — TELEPHONE (OUTPATIENT)
Dept: ONCOLOGY | Facility: CLINIC | Age: 41
End: 2021-11-19
Payer: COMMERCIAL

## 2021-11-19 ENCOUNTER — PREP FOR PROCEDURE (OUTPATIENT)
Dept: ONCOLOGY | Facility: CLINIC | Age: 41
End: 2021-11-19
Payer: COMMERCIAL

## 2021-11-19 DIAGNOSIS — N83.292 COMPLEX CYST OF LEFT OVARY: Primary | ICD-10-CM

## 2021-11-19 DIAGNOSIS — R18.8 OTHER ASCITES: ICD-10-CM

## 2021-11-19 NOTE — TELEPHONE ENCOUNTER
RNCC: Joid Macario; [phone]    Surgery is scheduled with Dr. Young on 12/7 at [facility].  Scheduled per patient did not have surgery on 11/16, need to reschedule.    H&P completed by PAC on 11/12 - see encounter    COVID-19 test + labs: 12/3 at [facility]    Post-op: 12/28, virtual visit     The RN completed the education regarding the surgery.     Patient will receive a phone call from pre-admission nurses 1-2 days prior to surgery with arrival and start time.    The surgery packet was sent via US mail and sent via [employer]    Patient will complete COVID-19 test that was scheduled by surgical coordinator 2-4 days prior to surgery.     I left a detailed voicemail AND sent a [employer] message with the scheduled information. Provided direct line. Will watch for response and/or call back from patient.

## 2021-11-28 PROBLEM — R87.619 ABNORMAL PAP SMEAR OF CERVIX: Status: ACTIVE | Noted: 2021-11-28

## 2021-11-29 ENCOUNTER — PREP FOR PROCEDURE (OUTPATIENT)
Dept: ONCOLOGY | Facility: CLINIC | Age: 41
End: 2021-11-29
Payer: COMMERCIAL

## 2021-11-29 DIAGNOSIS — R19.00 PELVIC MASS: Primary | ICD-10-CM

## 2021-11-29 DIAGNOSIS — Z11.59 ENCOUNTER FOR SCREENING FOR OTHER VIRAL DISEASES: ICD-10-CM

## 2021-11-29 RX ORDER — ACETAMINOPHEN 325 MG/1
975 TABLET ORAL ONCE
Status: CANCELLED | OUTPATIENT
Start: 2021-11-29 | End: 2021-11-29

## 2021-11-29 RX ORDER — HEPARIN SODIUM 10000 [USP'U]/ML
5000 INJECTION, SOLUTION INTRAVENOUS; SUBCUTANEOUS
Status: CANCELLED | OUTPATIENT
Start: 2021-11-29

## 2021-11-29 NOTE — PROGRESS NOTES
Surgery orders placed for OR on 12/7/21    Diagnosis:  Pelvic mass    Procedure: Laparoscopic left salpingo-oophorectomy, possible laparotomy, possible cancer staging, possible hysterectomy with right salpingo-oophorectomy, appendectomy, lymph nodes, biopsies    Needs COVID testing, PAC, labs, consent day of surgery    Fouzia Young MD  Gynecologic Oncology  [facility]

## 2021-12-03 ENCOUNTER — LAB (OUTPATIENT)
Dept: LAB | Facility: CLINIC | Age: 41
End: 2021-12-03
Payer: COMMERCIAL

## 2021-12-03 DIAGNOSIS — Z11.59 ENCOUNTER FOR SCREENING FOR OTHER VIRAL DISEASES: ICD-10-CM

## 2021-12-03 DIAGNOSIS — R19.00 PELVIC MASS: ICD-10-CM

## 2021-12-03 PROCEDURE — U0003 INFECTIOUS AGENT DETECTION BY NUCLEIC ACID (DNA OR RNA); SEVERE ACUTE RESPIRATORY SYNDROME CORONAVIRUS 2 (SARS-COV-2) (CORONAVIRUS DISEASE [COVID-19]), AMPLIFIED PROBE TECHNIQUE, MAKING USE OF HIGH THROUGHPUT TECHNOLOGIES AS DESCRIBED BY CMS-2020-01-R: HCPCS

## 2021-12-03 PROCEDURE — U0005 INFEC AGEN DETEC AMPLI PROBE: HCPCS

## 2021-12-04 LAB — SARS-COV-2 RNA RESP QL NAA+PROBE: NEGATIVE

## 2021-12-06 ENCOUNTER — ANESTHESIA EVENT (OUTPATIENT)
Dept: SURGERY | Facility: CLINIC | Age: 41
End: 2021-12-06
Payer: COMMERCIAL

## 2021-12-07 ENCOUNTER — HOSPITAL ENCOUNTER (OUTPATIENT)
Facility: CLINIC | Age: 41
Discharge: HOME | End: 2021-12-07
Attending: OBSTETRICS & GYNECOLOGY | Admitting: OBSTETRICS & GYNECOLOGY
Payer: COMMERCIAL

## 2021-12-07 ENCOUNTER — ANESTHESIA (OUTPATIENT)
Dept: SURGERY | Facility: CLINIC | Age: 41
End: 2021-12-07
Payer: COMMERCIAL

## 2021-12-07 VITALS
TEMPERATURE: 98 F | WEIGHT: 188.49 LBS | DIASTOLIC BLOOD PRESSURE: 95 MMHG | RESPIRATION RATE: 18 BRPM | SYSTOLIC BLOOD PRESSURE: 141 MMHG | HEIGHT: 63 IN | HEART RATE: 75 BPM | BODY MASS INDEX: 33.4 KG/M2 | OXYGEN SATURATION: 99 %

## 2021-12-07 DIAGNOSIS — N94.89 ADNEXAL MASS: Primary | ICD-10-CM

## 2021-12-07 LAB
ABO/RH(D): NORMAL
ANTIBODY SCREEN: NEGATIVE
B-HCG SERPL-ACNC: <1 IU/L (ref 0–5)
GLUCOSE BLDC GLUCOMTR-MCNC: 91 MG/DL (ref 70–99)
SPECIMEN EXPIRATION DATE: NORMAL

## 2021-12-07 PROCEDURE — 250N000011 HC RX IP 250 OP 636: Performed by: OBSTETRICS & GYNECOLOGY

## 2021-12-07 PROCEDURE — 88331 PATH CONSLTJ SURG 1 BLK 1SPC: CPT | Mod: 26 | Performed by: PATHOLOGY

## 2021-12-07 PROCEDURE — 250N000011 HC RX IP 250 OP 636: Performed by: ANESTHESIOLOGY

## 2021-12-07 PROCEDURE — 258N000003 HC RX IP 258 OP 636: Performed by: STUDENT IN AN ORGANIZED HEALTH CARE EDUCATION/TRAINING PROGRAM

## 2021-12-07 PROCEDURE — C9290 INJ, BUPIVACAINE LIPOSOME: HCPCS | Performed by: STUDENT IN AN ORGANIZED HEALTH CARE EDUCATION/TRAINING PROGRAM

## 2021-12-07 PROCEDURE — 88305 TISSUE EXAM BY PATHOLOGIST: CPT | Mod: 26 | Performed by: PATHOLOGY

## 2021-12-07 PROCEDURE — 250N000009 HC RX 250: Performed by: ANESTHESIOLOGY

## 2021-12-07 PROCEDURE — 250N000013 HC RX MED GY IP 250 OP 250 PS 637: Performed by: STUDENT IN AN ORGANIZED HEALTH CARE EDUCATION/TRAINING PROGRAM

## 2021-12-07 PROCEDURE — 250N000013 HC RX MED GY IP 250 OP 250 PS 637: Performed by: OBSTETRICS & GYNECOLOGY

## 2021-12-07 PROCEDURE — 360N000076 HC SURGERY LEVEL 3, PER MIN: Performed by: OBSTETRICS & GYNECOLOGY

## 2021-12-07 PROCEDURE — 36415 COLL VENOUS BLD VENIPUNCTURE: CPT | Performed by: OBSTETRICS & GYNECOLOGY

## 2021-12-07 PROCEDURE — 250N000011 HC RX IP 250 OP 636: Performed by: STUDENT IN AN ORGANIZED HEALTH CARE EDUCATION/TRAINING PROGRAM

## 2021-12-07 PROCEDURE — 88112 CYTOPATH CELL ENHANCE TECH: CPT | Mod: TC | Performed by: OBSTETRICS & GYNECOLOGY

## 2021-12-07 PROCEDURE — 82962 GLUCOSE BLOOD TEST: CPT

## 2021-12-07 PROCEDURE — 88112 CYTOPATH CELL ENHANCE TECH: CPT | Mod: 26 | Performed by: PATHOLOGY

## 2021-12-07 PROCEDURE — 250N000009 HC RX 250: Performed by: STUDENT IN AN ORGANIZED HEALTH CARE EDUCATION/TRAINING PROGRAM

## 2021-12-07 PROCEDURE — 272N000001 HC OR GENERAL SUPPLY STERILE: Performed by: OBSTETRICS & GYNECOLOGY

## 2021-12-07 PROCEDURE — 710N000010 HC RECOVERY PHASE 1, LEVEL 2, PER MIN: Performed by: OBSTETRICS & GYNECOLOGY

## 2021-12-07 PROCEDURE — 999N000141 HC STATISTIC PRE-PROCEDURE NURSING ASSESSMENT: Performed by: OBSTETRICS & GYNECOLOGY

## 2021-12-07 PROCEDURE — 84702 CHORIONIC GONADOTROPIN TEST: CPT | Performed by: OBSTETRICS & GYNECOLOGY

## 2021-12-07 PROCEDURE — 370N000017 HC ANESTHESIA TECHNICAL FEE, PER MIN: Performed by: OBSTETRICS & GYNECOLOGY

## 2021-12-07 PROCEDURE — 86850 RBC ANTIBODY SCREEN: CPT | Performed by: OBSTETRICS & GYNECOLOGY

## 2021-12-07 PROCEDURE — 250N000025 HC SEVOFLURANE, PER MIN: Performed by: OBSTETRICS & GYNECOLOGY

## 2021-12-07 PROCEDURE — 88305 TISSUE EXAM BY PATHOLOGIST: CPT | Mod: TC | Performed by: OBSTETRICS & GYNECOLOGY

## 2021-12-07 PROCEDURE — 710N000012 HC RECOVERY PHASE 2, PER MINUTE: Performed by: OBSTETRICS & GYNECOLOGY

## 2021-12-07 PROCEDURE — 88307 TISSUE EXAM BY PATHOLOGIST: CPT | Mod: 26 | Performed by: PATHOLOGY

## 2021-12-07 RX ORDER — NALOXONE HYDROCHLORIDE 0.4 MG/ML
0.4 INJECTION, SOLUTION INTRAMUSCULAR; INTRAVENOUS; SUBCUTANEOUS
Status: DISCONTINUED | OUTPATIENT
Start: 2021-12-07 | End: 2021-12-07 | Stop reason: HOSPADM

## 2021-12-07 RX ORDER — FENTANYL CITRATE 50 UG/ML
INJECTION, SOLUTION INTRAMUSCULAR; INTRAVENOUS PRN
Status: DISCONTINUED | OUTPATIENT
Start: 2021-12-07 | End: 2021-12-07

## 2021-12-07 RX ORDER — NALOXONE HYDROCHLORIDE 0.4 MG/ML
0.2 INJECTION, SOLUTION INTRAMUSCULAR; INTRAVENOUS; SUBCUTANEOUS
Status: DISCONTINUED | OUTPATIENT
Start: 2021-12-07 | End: 2021-12-07 | Stop reason: HOSPADM

## 2021-12-07 RX ORDER — LABETALOL HYDROCHLORIDE 5 MG/ML
10 INJECTION, SOLUTION INTRAVENOUS
Status: DISCONTINUED | OUTPATIENT
Start: 2021-12-07 | End: 2021-12-07 | Stop reason: HOSPADM

## 2021-12-07 RX ORDER — IBUPROFEN 600 MG/1
600 TABLET, FILM COATED ORAL EVERY 6 HOURS PRN
Qty: 12 TABLET | Refills: 0 | Status: SHIPPED | OUTPATIENT
Start: 2021-12-07 | End: 2022-05-05

## 2021-12-07 RX ORDER — CEFAZOLIN SODIUM 1 G/50ML
2 SOLUTION INTRAVENOUS SEE ADMIN INSTRUCTIONS
Status: DISCONTINUED | OUTPATIENT
Start: 2021-12-07 | End: 2021-12-07 | Stop reason: HOSPADM

## 2021-12-07 RX ORDER — CEFAZOLIN SODIUM 1 G/50ML
2 SOLUTION INTRAVENOUS
Status: COMPLETED | OUTPATIENT
Start: 2021-12-07 | End: 2021-12-07

## 2021-12-07 RX ORDER — ONDANSETRON 4 MG/1
4 TABLET, ORALLY DISINTEGRATING ORAL EVERY 30 MIN PRN
Status: DISCONTINUED | OUTPATIENT
Start: 2021-12-07 | End: 2021-12-07 | Stop reason: HOSPADM

## 2021-12-07 RX ORDER — SODIUM CHLORIDE, SODIUM LACTATE, POTASSIUM CHLORIDE, CALCIUM CHLORIDE 600; 310; 30; 20 MG/100ML; MG/100ML; MG/100ML; MG/100ML
INJECTION, SOLUTION INTRAVENOUS CONTINUOUS
Status: DISCONTINUED | OUTPATIENT
Start: 2021-12-07 | End: 2021-12-07 | Stop reason: HOSPADM

## 2021-12-07 RX ORDER — OXYCODONE HYDROCHLORIDE 5 MG/1
5 TABLET ORAL EVERY 4 HOURS PRN
Status: DISCONTINUED | OUTPATIENT
Start: 2021-12-07 | End: 2021-12-07 | Stop reason: HOSPADM

## 2021-12-07 RX ORDER — ONDANSETRON 2 MG/ML
4 INJECTION INTRAMUSCULAR; INTRAVENOUS EVERY 30 MIN PRN
Status: DISCONTINUED | OUTPATIENT
Start: 2021-12-07 | End: 2021-12-07 | Stop reason: HOSPADM

## 2021-12-07 RX ORDER — ONDANSETRON 2 MG/ML
INJECTION INTRAMUSCULAR; INTRAVENOUS PRN
Status: DISCONTINUED | OUTPATIENT
Start: 2021-12-07 | End: 2021-12-07

## 2021-12-07 RX ORDER — FENTANYL CITRATE 50 UG/ML
50 INJECTION, SOLUTION INTRAMUSCULAR; INTRAVENOUS EVERY 5 MIN PRN
Status: DISCONTINUED | OUTPATIENT
Start: 2021-12-07 | End: 2021-12-07 | Stop reason: HOSPADM

## 2021-12-07 RX ORDER — HEPARIN SODIUM 5000 [USP'U]/.5ML
5000 INJECTION, SOLUTION INTRAVENOUS; SUBCUTANEOUS
Status: COMPLETED | OUTPATIENT
Start: 2021-12-07 | End: 2021-12-07

## 2021-12-07 RX ORDER — IBUPROFEN 200 MG
800 TABLET ORAL ONCE
Status: DISCONTINUED | OUTPATIENT
Start: 2021-12-07 | End: 2021-12-07 | Stop reason: HOSPADM

## 2021-12-07 RX ORDER — FENTANYL CITRATE 50 UG/ML
25-50 INJECTION, SOLUTION INTRAMUSCULAR; INTRAVENOUS
Status: DISCONTINUED | OUTPATIENT
Start: 2021-12-07 | End: 2021-12-07 | Stop reason: HOSPADM

## 2021-12-07 RX ORDER — HYDROMORPHONE HCL IN WATER/PF 6 MG/30 ML
0.4 PATIENT CONTROLLED ANALGESIA SYRINGE INTRAVENOUS EVERY 5 MIN PRN
Status: DISCONTINUED | OUTPATIENT
Start: 2021-12-07 | End: 2021-12-07 | Stop reason: HOSPADM

## 2021-12-07 RX ORDER — BUPIVACAINE HYDROCHLORIDE 2.5 MG/ML
INJECTION, SOLUTION EPIDURAL; INFILTRATION; INTRACAUDAL
Status: COMPLETED | OUTPATIENT
Start: 2021-12-07 | End: 2021-12-07

## 2021-12-07 RX ORDER — SODIUM CHLORIDE, SODIUM LACTATE, POTASSIUM CHLORIDE, CALCIUM CHLORIDE 600; 310; 30; 20 MG/100ML; MG/100ML; MG/100ML; MG/100ML
INJECTION, SOLUTION INTRAVENOUS CONTINUOUS PRN
Status: DISCONTINUED | OUTPATIENT
Start: 2021-12-07 | End: 2021-12-07

## 2021-12-07 RX ORDER — ACETAMINOPHEN 325 MG/1
975 TABLET ORAL ONCE
Status: COMPLETED | OUTPATIENT
Start: 2021-12-07 | End: 2021-12-07

## 2021-12-07 RX ORDER — ACETAMINOPHEN 325 MG/1
975 TABLET ORAL ONCE
Status: DISCONTINUED | OUTPATIENT
Start: 2021-12-07 | End: 2021-12-07

## 2021-12-07 RX ORDER — DEXAMETHASONE SODIUM PHOSPHATE 4 MG/ML
INJECTION, SOLUTION INTRA-ARTICULAR; INTRALESIONAL; INTRAMUSCULAR; INTRAVENOUS; SOFT TISSUE PRN
Status: DISCONTINUED | OUTPATIENT
Start: 2021-12-07 | End: 2021-12-07

## 2021-12-07 RX ORDER — OXYCODONE HYDROCHLORIDE 5 MG/1
5 TABLET ORAL EVERY 6 HOURS PRN
Qty: 6 TABLET | Refills: 0 | Status: SHIPPED | OUTPATIENT
Start: 2021-12-07 | End: 2021-12-10

## 2021-12-07 RX ORDER — FLUMAZENIL 0.1 MG/ML
0.2 INJECTION, SOLUTION INTRAVENOUS
Status: DISCONTINUED | OUTPATIENT
Start: 2021-12-07 | End: 2021-12-07 | Stop reason: HOSPADM

## 2021-12-07 RX ORDER — OXYCODONE HYDROCHLORIDE 5 MG/1
5 TABLET ORAL
Status: DISCONTINUED | OUTPATIENT
Start: 2021-12-07 | End: 2021-12-07 | Stop reason: HOSPADM

## 2021-12-07 RX ORDER — ACETAMINOPHEN 325 MG/1
650 TABLET ORAL EVERY 6 HOURS PRN
Qty: 24 TABLET | Refills: 0 | Status: SHIPPED | OUTPATIENT
Start: 2021-12-07

## 2021-12-07 RX ORDER — PROPOFOL 10 MG/ML
INJECTION, EMULSION INTRAVENOUS PRN
Status: DISCONTINUED | OUTPATIENT
Start: 2021-12-07 | End: 2021-12-07

## 2021-12-07 RX ORDER — LIDOCAINE HYDROCHLORIDE 20 MG/ML
INJECTION, SOLUTION INFILTRATION; PERINEURAL PRN
Status: DISCONTINUED | OUTPATIENT
Start: 2021-12-07 | End: 2021-12-07

## 2021-12-07 RX ADMIN — HYDROMORPHONE HYDROCHLORIDE 0.4 MG: 0.2 INJECTION, SOLUTION INTRAMUSCULAR; INTRAVENOUS; SUBCUTANEOUS at 12:08

## 2021-12-07 RX ADMIN — FENTANYL CITRATE 50 MCG: 50 INJECTION INTRAMUSCULAR; INTRAVENOUS at 11:45

## 2021-12-07 RX ADMIN — OXYCODONE HYDROCHLORIDE 5 MG: 5 TABLET ORAL at 13:56

## 2021-12-07 RX ADMIN — SODIUM CHLORIDE, POTASSIUM CHLORIDE, SODIUM LACTATE AND CALCIUM CHLORIDE: 600; 310; 30; 20 INJECTION, SOLUTION INTRAVENOUS at 08:30

## 2021-12-07 RX ADMIN — SODIUM CHLORIDE, POTASSIUM CHLORIDE, SODIUM LACTATE AND CALCIUM CHLORIDE: 600; 310; 30; 20 INJECTION, SOLUTION INTRAVENOUS at 12:00

## 2021-12-07 RX ADMIN — ONDANSETRON 4 MG: 2 INJECTION INTRAMUSCULAR; INTRAVENOUS at 10:42

## 2021-12-07 RX ADMIN — LIDOCAINE HYDROCHLORIDE 80 MG: 20 INJECTION, SOLUTION INFILTRATION; PERINEURAL at 08:35

## 2021-12-07 RX ADMIN — SUGAMMADEX 200 MG: 100 INJECTION, SOLUTION INTRAVENOUS at 10:42

## 2021-12-07 RX ADMIN — ROCURONIUM BROMIDE 50 MG: 50 INJECTION, SOLUTION INTRAVENOUS at 08:35

## 2021-12-07 RX ADMIN — PROPOFOL 170 MG: 10 INJECTION, EMULSION INTRAVENOUS at 08:35

## 2021-12-07 RX ADMIN — FENTANYL CITRATE 50 MCG: 50 INJECTION, SOLUTION INTRAMUSCULAR; INTRAVENOUS at 08:00

## 2021-12-07 RX ADMIN — FENTANYL CITRATE 50 MCG: 50 INJECTION INTRAMUSCULAR; INTRAVENOUS at 11:10

## 2021-12-07 RX ADMIN — ROCURONIUM BROMIDE 20 MG: 50 INJECTION, SOLUTION INTRAVENOUS at 09:14

## 2021-12-07 RX ADMIN — MIDAZOLAM 1 MG: 1 INJECTION INTRAMUSCULAR; INTRAVENOUS at 08:00

## 2021-12-07 RX ADMIN — FENTANYL CITRATE 50 MCG: 50 INJECTION, SOLUTION INTRAMUSCULAR; INTRAVENOUS at 09:29

## 2021-12-07 RX ADMIN — HEPARIN SODIUM 5000 UNITS: 10000 INJECTION, SOLUTION INTRAVENOUS; SUBCUTANEOUS at 08:15

## 2021-12-07 RX ADMIN — ROCURONIUM BROMIDE 20 MG: 50 INJECTION, SOLUTION INTRAVENOUS at 09:54

## 2021-12-07 RX ADMIN — ACETAMINOPHEN 975 MG: 325 TABLET, FILM COATED ORAL at 13:20

## 2021-12-07 RX ADMIN — ACETAMINOPHEN 975 MG: 325 TABLET, FILM COATED ORAL at 07:07

## 2021-12-07 RX ADMIN — BUPIVACAINE 20 ML: 13.3 INJECTION, SUSPENSION, LIPOSOMAL INFILTRATION at 08:04

## 2021-12-07 RX ADMIN — DEXAMETHASONE SODIUM PHOSPHATE 6 MG: 4 INJECTION, SOLUTION INTRA-ARTICULAR; INTRALESIONAL; INTRAMUSCULAR; INTRAVENOUS; SOFT TISSUE at 09:01

## 2021-12-07 RX ADMIN — FENTANYL CITRATE 50 MCG: 50 INJECTION, SOLUTION INTRAMUSCULAR; INTRAVENOUS at 09:10

## 2021-12-07 RX ADMIN — FENTANYL CITRATE 50 MCG: 50 INJECTION, SOLUTION INTRAMUSCULAR; INTRAVENOUS at 08:35

## 2021-12-07 RX ADMIN — BUPIVACAINE HYDROCHLORIDE 20 ML: 2.5 INJECTION, SOLUTION EPIDURAL; INFILTRATION; INTRACAUDAL; PERINEURAL at 08:04

## 2021-12-07 RX ADMIN — CEFAZOLIN 2 G: 10 INJECTION, POWDER, FOR SOLUTION INTRAVENOUS at 08:46

## 2021-12-07 ASSESSMENT — LIFESTYLE VARIABLES: TOBACCO_USE: 1

## 2021-12-07 ASSESSMENT — MIFFLIN-ST. JEOR: SCORE: 1491.19

## 2021-12-07 NOTE — ANESTHESIA PROCEDURE NOTES
Airway       Patient location during procedure: OR       Procedure Start/Stop Times: 12/7/2021 8:39 AM  Staff -        CRNA: Hanane Carpio APRN CRNA       Performed By: CRNA  Consent for Airway        Urgency: elective  Indications and Patient Condition       Indications for airway management: charity-procedural         Mask difficulty assessment: 1 - vent by mask    Final Airway Details       Final airway type: endotracheal airway       Successful airway: ETT - single  Endotracheal Airway Details        ETT size (mm): 7.0       Cuffed: yes       Successful intubation technique: direct laryngoscopy       DL Blade Type: MAC 3       Grade View of Cords: 1       Adjucts: stylet       Position: Right       Measured from: lips       Secured at (cm): 21       Bite block used: None    Post intubation assessment        Number of attempts at approach: 1       Number of other approaches attempted: 0       Secured with: pink tape       Ease of procedure: easy       Dentition: Intact and Unchanged

## 2021-12-07 NOTE — PROGRESS NOTES
"Gynecologic Oncology Postoperative Check Note  12/7/2021    S: Patient complaining of some pain, but overall controlled with po pain meds. Ambulating with mild pain that improved with abdominal binder. Voiding spontaneously and without difficulty. Tolerating crackers and water without vomiting, had some nausea that improved with zofran. Denies chest pain, shortness of breath, dizziness, or other concerns at this time.    O:  Vitals:    12/07/21 1130 12/07/21 1145 12/07/21 1200 12/07/21 1215   BP: 136/89 (!) 136/93 118/80 114/81   BP Location:       Cuff Size:       Pulse: 73 75 73 78   Resp: 16 14 16 16   Temp:       TempSrc:       SpO2: 99% 100% 98% 98%   Weight:       Height:         Gen: In NAD distress  Cardio: RRR, S1/S2, no murmurs  Resp: CTAB, no wheezing or crackles  Abdomen: soft, appropriately tender, non-distended, incisions c/d/i, open to air  Extremities: Trace edema    A: 41 year old POD#0 s/p \"Laparoscopic left salpingo-oophorectomy, Pelvic Washing, Cervical Biopsy\" for complex left adnexal mass. Doing well post-op.    Dz: Intraoperative findings were consistent with tubal ovarian abscess. Frozen pathology was negative for malignancy.   FEN: Advance as tolerated  Pain: Managing with po pain medications PRN. Discharge home with ibuprofen 600mg q6hr PRN (12 doses), tylenol 650mg q6hr PRN (12 doses), oxycodone 5mg q6hr PRN (6 tablets)  GI: Tolerating water and cracker without vomiting, nausea improved with zofran. Continue home senna-S. Instructed to call the clinic if significant nausea returns  : voiding spontaneously    Dispo: To home    Екатерина Pratt MD  OB/GYN Resident, PGY-1  12/7/2021 2:38 PM        "

## 2021-12-07 NOTE — ANESTHESIA POSTPROCEDURE EVALUATION
Patient: Elizabeth Gil    Procedure: Procedure(s):  Laparoscopic left salpingo-oophorectomy, Pelvic Washing, Cervical Biopsy, lysis of adhesions <20 mins       Diagnosis:Pelvic mass [R19.00]  Diagnosis Additional Information: No value filed.    Anesthesia Type:  General    Note:  Disposition: Outpatient   Postop Pain Control: Uneventful            Sign Out: Well controlled pain   PONV: No   Neuro/Psych: Uneventful            Sign Out: Acceptable/Baseline neuro status   Airway/Respiratory: Uneventful            Sign Out: Acceptable/Baseline resp. status   CV/Hemodynamics: Uneventful            Sign Out: Acceptable CV status; No obvious hypovolemia; No obvious fluid overload   Other NRE: NONE   DID A NON-ROUTINE EVENT OCCUR? No           Last vitals:  Vitals Value Taken Time   /93 12/07/21 1145   Temp 35.9  C (96.6  F) 12/07/21 1100   Pulse 75 12/07/21 1149   Resp 14 12/07/21 1145   SpO2 99 % 12/07/21 1149   Vitals shown include unvalidated device data.    Electronically Signed By: Kandy Botello MD  December 7, 2021  11:51 AM

## 2021-12-07 NOTE — ANESTHESIA PREPROCEDURE EVALUATION
Anesthesia Pre-Procedure Evaluation    Patient: Elizabeth Gil   MRN: [id number] : 1980        Preoperative Diagnosis: Complex cyst of left ovary [N83.292]  Other ascites [R18.8]    Procedure : Procedure(s):  Laparoscopic left salpingo-oophorectomy  possible laparotomy, possible cancer staging, possible hysterectomy with right salpingo-oophorectomy  possible appendectomy, possible lymph nodes, biopsies  PAC EVALUATION       Past Medical History:   Diagnosis Date     Abnormal Pap smear of cervix 10/18/2018    10/18/18: See problem list.      Cervical high risk HPV (human papillomavirus) test positive 10/18/2018, 19    See problem list     Complex cyst of left ovary      Female stress incontinence      Hypertension      Migraine       Past Surgical History:   Procedure Laterality Date     PANNICULECTOMY  2012      Allergies   Allergen Reactions     Morphine Itching      Social History     Tobacco Use     Smoking status: Current Every Day Smoker     Packs/day: 1.00     Years: 15.00     Pack years: 15.00     Types: Cigarettes     Smokeless tobacco: Never Used   Substance Use Topics     Alcohol use: No     Comment: 2-3      Wt Readings from Last 1 Encounters:   21 85.5 kg (188 lb 7.9 oz)        Anesthesia Evaluation   Pt has had prior anesthetic. Type: General.    No history of anesthetic complications       ROS/MED HX  ENT/Pulmonary:     (+) KARY risk factors, snores loudly, observed stopped breathing, tobacco use, Current use, 15  Pack-Year Hx,  recent URI, resolving-no fever, mild sore throat, cough:     Neurologic:     (+) migraines,     Cardiovascular:     (+) hypertension-range: 130s/80s/ ----Previous cardiac testing   Echo: Date: Results:    Stress Test: Date: Results:    ECG Reviewed: Date: 21 Results:  ST, LVH, ST and T wave changes  Cath: Date: Results:   (-) taking anticoagulants/antiplatelets   METS/Exercise Tolerance: >4 METS Comment: [employer]   Hematologic:  - neg  hematologic  ROS     Musculoskeletal:  - neg musculoskeletal ROS     GI/Hepatic:  - neg GI/hepatic ROS     Renal/Genitourinary:  - neg Renal ROS     Endo:     (+) Obesity,     Psychiatric/Substance Use:  - neg psychiatric ROS     Infectious Disease:  - neg infectious disease ROS     Malignancy: Comment: Complex cystic mass left ovary      Other:      (+) LMP: 10/20/21, ,         Physical Exam    Airway        Mallampati: I   TM distance: > 3 FB   Neck ROM: full   Mouth opening: > 3 cm    Respiratory Devices and Support         Dental  no notable dental history         Cardiovascular          Rhythm and rate: regular and normal     Pulmonary       Comment: Congested cough, lungs clear with coughing    breath sounds clear to auscultation           OUTSIDE LABS:  CBC:   Lab Results   Component Value Date    WBC 11.5 (H) 11/16/2021    WBC 12.9 (H) 11/12/2021    HGB 12.1 11/16/2021    HGB 12.6 11/12/2021    HCT 39.3 11/16/2021    HCT 40.1 11/12/2021     11/16/2021     11/12/2021     BMP:   Lab Results   Component Value Date     11/12/2021     10/16/2021    POTASSIUM 4.0 11/12/2021    POTASSIUM 3.8 10/16/2021    CHLORIDE 106 11/12/2021    CHLORIDE 106 10/16/2021    CO2 28 11/12/2021    CO2 25 10/16/2021    BUN 12 11/12/2021    BUN 9 10/16/2021    CR 0.58 11/12/2021    CR 0.65 10/16/2021    GLC 91 12/07/2021    GLC 94 11/16/2021     COAGS: No results found for: PTT, INR, FIBR  POC:   Lab Results   Component Value Date    HCGS Negative 10/16/2021     HEPATIC:   Lab Results   Component Value Date    ALBUMIN 3.5 11/12/2021    PROTTOTAL 7.7 11/12/2021    ALT 26 11/12/2021    AST 31 11/12/2021    ALKPHOS 96 11/12/2021    BILITOTAL 0.3 11/12/2021     OTHER:   Lab Results   Component Value Date    A1C 5.6 11/05/2020    MANDEEP 9.5 11/12/2021    LIPASE 84 10/16/2021    TSH 1.81 11/05/2020       Anesthesia Plan    ASA Status:  2   NPO Status:  NPO Appropriate    Anesthesia Type: General.     - Airway: ETT    Induction: Intravenous.   Maintenance: Balanced.        Consents    Anesthesia Plan(s) and associated risks, benefits, and realistic alternatives discussed. Questions answered and patient/representative(s) expressed understanding.    - Discussed:     - Discussed with:  Patient         Postoperative Care    Pain management: IV analgesics, Oral pain medications, Peripheral nerve block (Single Shot).   PONV prophylaxis: Ondansetron (or other 5HT-3), Dexamethasone or Solumedrol     Comments:                PAC Discussion and Assessment    ASA Classification: 3  Case is suitable for: ASC and Parks  Anesthetic techniques and relevant risks discussed: GA  Invasive monitoring and risk discussed: No    Possibility and Risk of blood transfusion discussed: Yes            PAC Resident/NP Anesthesia Assessment: ASSESSMENT and PLAN  Elizabeth Gil is a 41 year old female who presents for pre-operative H & P in preparation for Laparoscopic left salpingo-oophorectomy, possible laparotomy, possible cancer staging, possible hysterectomy with right salpingo-oophorectomy, possible appendectomy, possible lymph nodes, biopsies  with Dr. Young on 11/16/21 at [facility].   RCRI: 0.9% risk of serious cardiac event  VTE risk: 0.26-1.8%  KARY: 3/8=Intermediate risk  PONV: 2.    --Complex cyst of left ovary with above procedure planned.   --No history of problems with anesthesia.  --HYPERTENSION. BPs at home 130s/80s. Will hold hydrochlorothiazide on DOS. No other cardiac history, symptoms or meds. EKG above. Good exercise tolerance.   --Current smoker 1 PPD X 15 years. URI, resolving, no fever, mild sore throat, congested cough. Lungs CTA with cough. Denies shortness of breath. Intermediate risk for KARY. Snores. COVID testing today.  --Migraines. Topamax prn, rare use.  --Stress incontinence. Not taking Detrol at this time.   --Type and screen drawn today.       Patient was discussed with  Dr García.       The patient is optimized and acceptable candidate for proposed procedure. Arrival time, NPO, shower and medication instructions provided by nursing staff today.    Reviewed and Signed by PAC Mid-Level Provider/Resident  Mid-Level Provider/Resident: XIN Galarza, CNS  Date: 11/12/21  Time: 9:00am                               Franca Parra MD

## 2021-12-07 NOTE — ANESTHESIA CARE TRANSFER NOTE
Patient: Elizabeth Gil    Procedure: Procedure(s):  Laparoscopic left salpingo-oophorectomy, Pelvic Washing, Cervical Biopsy, lysis of adhesions <20 mins       Diagnosis: Pelvic mass [R19.00]  Diagnosis Additional Information: No value filed.    Anesthesia Type:   General     Note:    Oropharynx: oropharynx clear of all foreign objects and spontaneously breathing  Level of Consciousness: drowsy  Oxygen Supplementation: face mask  Level of Supplemental Oxygen (L/min / FiO2): 4  Independent Airway: airway patency satisfactory and stable  Dentition: dentition unchanged      Patient transferred to: PACU    Handoff Report: Identifed the Patient, Identified the Reponsible Provider, Reviewed the pertinent medical history, Discussed the surgical course, Reviewed Intra-OP anesthesia mangement and issues during anesthesia, Set expectations for post-procedure period and Allowed opportunity for questions and acknowledgement of understanding      Vitals:  Vitals Value Taken Time   /95 12/07/21 1057   Temp     Pulse 76 12/07/21 1102   Resp     SpO2 100 % 12/07/21 1102   Vitals shown include unvalidated device data.    Electronically Signed By: XIN Burns CRNA  December 7, 2021  11:04 AM

## 2021-12-07 NOTE — BRIEF OP NOTE
[facility]    Brief Operative Note    Pre-operative diagnosis: Pelvic mass [R19.00]  Post-operative diagnosis Left adnexal mass, suspicious for tubal ovarian abscess    Procedure(s):  Laparoscopic left salpingo-oophorectomy, Pelvic Washing, Cervical Biopsy, lysis of adhesions <20 mins  Anesthesia: Combined General with Block    Surgeon(s) and Role:     * Fouzia Young MD - Primary     * Bennie Hurtado MD - Fellow - Assisting     * Venita Nathan MD - Resident - Assisting      Estimated Blood Loss: 10mL  IV fluid: 800mL LR  Urine output: 160mL clear urine    Specimens:   ID Type Source Tests Collected by Time Destination   1 : Cervical Biopsies Biopsy Cervix SURGICAL PATHOLOGY EXAM Fouzia Young MD 12/7/2021  9:17 AM    2 : pelvic washings Washings Pelvis NON-GYNECOLOGIC CYTOLOGY Fouzia Young MD 12/7/2021  9:25 AM    3 : Left Ovary and Fallopian Tube Tissue Ovary and Fallopian Tube, Left SURGICAL PATHOLOGY EXAM Fouzia Young MD 12/7/2021  9:47 AM    4 : Left Portion of Adnexal  Tissue Other SURGICAL PATHOLOGY EXAM Fouzia Young MD 12/7/2021 10:01 AM      Findings:     Exam under anesthesia: normal appearing external genitalia. About 5 cervical bioopsies obtained, including 1 endocervical biopsy.  Laparoscopy: No injury upon trochar entry sites. Omental adhesion noted infraumbilically to the anterior abdominal wall. Normal appearing liver, diaphragm, bowel. The left adnexal mass at ~10cm appears to be necrotic with multiple cyst filled with serous fluid. The left ovary and left  fallopian tube are adherent to left side of the uterus, the left side pelvic side wall, and the sigmoid colon.     Complications: None    Venita Nathan MD   N OBGYN PGY-4  12/07/2021

## 2021-12-07 NOTE — OR NURSING
OB/Gyn resident at bedside 1400: Patient has voided, but pain and nausea persists. Per Gyn/onc please have patient wait another 30-60 minutes and assess pain/nausea prior to discharge. Also will order abdominal binder.

## 2021-12-07 NOTE — DISCHARGE INSTRUCTIONS
[facility]  Same-Day Surgery   Adult Discharge Orders & Instructions     For 24 hours after surgery    1. Get plenty of rest.  A responsible adult must stay with you for at least 24 hours after you leave the hospital.   2. Do not drive or use heavy equipment.  If you have weakness or tingling, don't drive or use heavy equipment until this feeling goes away.  3. Do not drink alcohol.  4. Avoid strenuous or risky activities.  Ask for help when climbing stairs.   5. You may feel lightheaded.  IF so, sit for a few minutes before standing.  Have someone help you get up.   6. If you have nausea (feel sick to your stomach): Drink only clear liquids such as apple juice, ginger ale, broth or 7-Up.  Rest may also help.  Be sure to drink enough fluids.  Move to a regular diet as you feel able.  7. You may have a slight fever. Call the doctor if your fever is over 100 F (37.7 C) (taken under the tongue) or lasts longer than 24 hours.  8. You may have a dry mouth, a sore throat, muscle aches or trouble sleeping.  These should go away after 24 hours.  9. Do not make important or legal decisions.   Call your doctor for any of the followin.  Signs of infection (fever, growing tenderness at the surgery site, a large amount of drainage or bleeding, severe pain, foul-smelling drainage, redness, swelling).    2. It has been over 8 to 10 hours since surgery and you are still not able to urinate (pass water).    3.  Headache for over 24 hours.    4.  Numbness, tingling or weakness the day after surgery (if you had spinal anesthesia).  To contact a doctor, call Dr Young's clinic at [phone] or:        [phone] and ask for the resident on call for gynecology (answered 24 hours a day)      Emergency Department:[facility]: [phone]

## 2021-12-07 NOTE — OP NOTE
Gynecologic Oncology Operative Note     DATE OF SURGERY:12/7/2021       PREOPERATIVE DIAGNOSES: Adnexal mass  POSTOPERATIVE DIAGNOSES:  Same as above     OPERATIVE PROCEDURES:   Procedure(s):  Laparoscopic left salpingo-oophorectomy, Pelvic Washing, Cervical Biopsy, lysis of adhesions <20 mins      SURGEON:  Surgeon(s):  Fouzia Young MD - Primary  Bennie Hurtado MD - Fellow Assisting  Venita Nathan MD - Resident Assisting     ANESTHESIA: Combined General with Block      ESTIMATED BLOOD LOSS: 10 mL     TOTAL INTRAVENOUS FLUIDS: 800 mL LR     TOTAL URINE OUTPUT: 160 mL     SPECIMENS:  ID Type Source Tests Collected by Time Destination   1 : Cervical Biopsies Biopsy Cervix SURGICAL PATHOLOGY EXAM Fouzia Young MD 12/7/2021  9:17 AM    2 : pelvic washings Washings Pelvis NON-GYNECOLOGIC CYTOLOGY Fouzia Young MD 12/7/2021  9:25 AM    3 : Left Ovary and Fallopian Tube Tissue Ovary and Fallopian Tube, Left SURGICAL PATHOLOGY EXAM Fouzia Young MD 12/7/2021  9:47 AM    4 : Left Portion of Adnexal  Tissue Other SURGICAL PATHOLOGY EXAM Fouzia Young MD 12/7/2021 10:01 AM       Findings: Normal appearing cervix.  Normal external genitalia.  Bimanual exam with known left adnexal fullness. Upond laparoscopy, examination of upper abdomen with normal appearing liver edge, gallbladder, stomach, bowel, and omentum.  Small adhesion from the omentum to the anterior abdominal wall at the level of the umbilicus.  Right fallopian tube and ovary normal in appearance.  Left ovary 7 cm in size, multicystic, with extensive areas of necrosis.  Ovary noted to be adherent to the posterior uterus, left pelvic sidewall, and sigmoid colon with filmy adhesions.  The left fallopian tube was adherent to the left ovarian mass.     Indications:  41 year old female with a left adnexal mass and normal CA-125 and history of HSIL/HR HPV+ pap smear.    Description of Procedure:  The patient was taken to the OR and anesthesia was induced. The  patient was placed in dorsal lithotomy with stirrups. The patient's abdomen and vagina were prepped and draped in the usual sterile fashion. A diana catheter was placed in the bladder. Surgical time out was completed.  5 colposcopic biopsies were take from the cervix using a tischler forceps.  Hemostasis was achieved with pressure. A sponge stick was placed within the vagina for manipulation.    After changing gloves, attention was then turned to the abdomen. A 12 mm incision was made 3 cm above the umbilicus.  The fascia was grasped with kocher clamps and entered sharply.  The fascial edges were tagged with 0 vicryl suture.  The peritoneum was grasped with aguila clamps and entered sharply.  The 12 mm balloon trocar was inserted into the abdomen. The abdomen was insufflated to an operating pressure of 15 mmHg with carbon dioxide. No injuries were visualized from entry into the abdomen.  Areas superior and medial to the anterior superior iliac spines were identified bilaterally and were transilluminated to ensure that prominent vessels were not present.  Two additional 5 mm trocars were placed under direct visualization.  The patient was placed in Trendelenburg position to move the bowels out of the surgical field and improve visualization of the pelvic organs. A small adhesion from the omentum to the anterior abdominal wall at the level of the umbilicus was grasped, cauterized, and transected with the LigaSure device.   The abdomen and pelvis were then inspected and the above described findings were noted.  Pelvic washings were obtained.    The left ovarian mass was noted to be multicystic and complex in appearance with significant areas of inflammation and necrosis.  The mass was adherent to the the posterior uterus, left pelvic sidewall, and sigmoid colon.  These adhesions were carefull dissected using blunt and sharp dissection.    The Ligasure was then used to fulgurate and divide the gonadal vessles. The  mesosalpinx was then fulgurated and divided using the Ligasure to the level of the uterine cornua. The fallopian tube and utero-ovarian ligament were transected and the adnexa was placed in the pelvis.   A 15 cm endocatch bag was placed through the 12 mm port and the left adnexal mass was placed in the bag.  The supra-umbilical port site was extended cephalad and the left adnexa was removed from the body and sent for frozen analysis.  A few remaining pieces of cyst wall were placed in a 5 cm endocatch bag and removed from the body and sent to pathology for permanent section.  Frozen pathology returned as a benign process.  The pelvis was irrigated copiously and all vascular pedicles were noted to be hemostatic.    The right and left lower quadrant trocars were removed under direct visualization. The supra-umbilical port was removed and the fascia was closed with interrupted figure of eight sutures of 0 Vicryl. The port incisions were closed with 4-0 Monocryl in a subcuticular fashion and covered with dermabond. All sponge, lap, needle, and instrument counts were correct prior to completion of the case.    Dr. Young was present and scrubbed for the entire procedure.    Bennie Hurtado MD     Attending Attestation:  I was present and scrubbed for the entire surgical procedure.  I have reviewed and edited above note and agree with findings as documented.    Fouzia Young MD  Gynecologic Oncology  [facility]

## 2021-12-07 NOTE — ANESTHESIA PROCEDURE NOTES
TAP Procedure Note    Pre-Procedure   Staff -        Anesthesiologist:  Luis Carlos Olguin MD       Resident/Fellow: Karthik Madrigal MD       Performed By: resident       Location: pre-op       Procedure Start/Stop Times: 12/7/2021 8:00 AM and 12/7/2021 8:04 AM       Pre-Anesthestic Checklist: patient identified, IV checked, site marked, risks and benefits discussed, informed consent, monitors and equipment checked, pre-op evaluation, at physician/surgeon's request and post-op pain management  Timeout:       Correct Patient: Yes        Correct Procedure: Yes        Correct Site: Yes        Correct Position: Yes        Correct Laterality: Yes        Site Marked: Yes  Procedure Documentation  Procedure: TAP       Laterality: bilateral       Patient Position: supine       Skin prep: Chloraprep       Needle Type: insulated       Needle Gauge: 21.        Needle Length (millimeters): 110        Ultrasound guided       1. Ultrasound was used to identify targeted nerve, plexus, vascular marker, or fascial plane and place a needle adjacent to it in real-time.       2. Ultrasound was used to visualize the spread of anesthetic in close proximity to the above referenced structure.       3. A permanent image is entered into the patient's record.    Assessment/Narrative         The placement was negative for: blood aspirated and painful injection     Bolus given via needle..        Secured via.        Insertion/Infusion Method: Single Shot       Complications: none    Medication(s) Administered   Bupivacaine 0.25% PF (Infiltration), 20 mL  Bupivacaine liposome (Exparel) 1.3% LA inj susp (Infiltration), 20 mL  Medication Administration Time: 12/7/2021 8:04 AM

## 2021-12-08 LAB
PATH REPORT.COMMENTS IMP SPEC: NORMAL
PATH REPORT.FINAL DX SPEC: NORMAL
PATH REPORT.GROSS SPEC: NORMAL
PATH REPORT.RELEVANT HX SPEC: NORMAL

## 2021-12-14 LAB
PATH REPORT.COMMENTS IMP SPEC: NORMAL
PATH REPORT.COMMENTS IMP SPEC: NORMAL
PATH REPORT.FINAL DX SPEC: NORMAL
PATH REPORT.GROSS SPEC: NORMAL
PATH REPORT.INTRAOP OBS SPEC DOC: NORMAL
PATH REPORT.MICROSCOPIC SPEC OTHER STN: NORMAL
PATH REPORT.RELEVANT HX SPEC: NORMAL
PHOTO IMAGE: NORMAL

## 2021-12-17 DIAGNOSIS — E66.3 OVERWEIGHT: ICD-10-CM

## 2021-12-17 NOTE — TELEPHONE ENCOUNTER
Reason for Call:  Medication or medication refill:    Do you use a [facility]?  Name of the pharmacy and phone number for the current request: [id number] - [place], [place] - [address] AT [address]    Name of the medication requested: phentermine (ADIPEX-P) 37.5 MG tablet     Other request: N/A    Can we leave a detailed message on this number? YES    Phone number patient can be reached at: Home number on file [phone] (home)    Best Time: Anytime    Call taken on 12/17/2021 at 11:00 AM by Stephen Alonso MA

## 2021-12-17 NOTE — TELEPHONE ENCOUNTER
Requested Prescriptions   Pending Prescriptions Disp Refills     phentermine (ADIPEX-P) 37.5 MG tablet 30 tablet 3     Sig: Take 1 tablet (37.5 mg) by mouth every morning (before breakfast)       There is no refill protocol information for this order        Routing refill request to provider for review/approval because:  Drug not on the [facility] refill protocol     Demetra Kim RN  [facility]

## 2021-12-20 RX ORDER — PHENTERMINE HYDROCHLORIDE 37.5 MG/1
37.5 TABLET ORAL
Qty: 30 TABLET | Refills: 3 | OUTPATIENT
Start: 2021-12-20

## 2021-12-21 DIAGNOSIS — G43.819 OTHER MIGRAINE WITHOUT STATUS MIGRAINOSUS, INTRACTABLE: ICD-10-CM

## 2021-12-21 NOTE — TELEPHONE ENCOUNTER
Reason for Call:  Medication or medication refill:    Do you use a [facility]?  Name of the pharmacy and phone number for the current request:  [id number] - [place], [place] - [address] AT [address] (Pharmacy)  Name of the medication requested: topiramate (TOPAMAX) 50 MG tablet.  topiramate (TOPAMAX) 100 MG tablet    Other request: N/A    Can we leave a detailed message on this number? YES    Phone number patient can be reached at: Cell number on file:    Telephone Information:   Mobile [phone]       Best Time: ANY    Call taken on 12/21/2021 at 10:58 AM by Keely Wilson

## 2021-12-21 NOTE — TELEPHONE ENCOUNTER
"Patient was no show at last scheduled appt. Needs labs.    Requested Prescriptions   Pending Prescriptions Disp Refills     topiramate (TOPAMAX) 50 MG tablet 30 tablet 0     Sig: TAKE ONE TABLET BY MOUTH EVERY NIGHT AT BEDTIME WITH 100MG TABLET       Anti-Seizure Meds Protocol  Failed - 12/21/2021 10:59 AM        Failed - Recent (12 mo) or future (30 days) visit within the authorizing provider's specialty     Patient has had an office visit with the authorizing provider or a provider within the authorizing providers department within the previous 12 mos or has a future within next 30 days. See \"Patient Info\" tab in inGluMetricset, or \"Choose Columns\" in Meds & Orders section of the refill encounter.              Failed - Review Authorizing provider's last note.      Refer to last progress notes: confirm request is for original authorizing provider (cannot be through other providers).          Failed - Normal CBC on file in past 26 months     Recent Labs   Lab Test 11/16/21  0733   WBC 11.5*   RBC 4.54   HGB 12.1   HCT 39.3                    Passed - Normal ALT or AST on file in past 26 months     Recent Labs   Lab Test 11/12/21  1052   ALT 26     Recent Labs   Lab Test 11/12/21  1052   AST 31             Passed - Normal platelet count on file in past 26 months     Recent Labs   Lab Test 11/16/21  0733                  Passed - Medication is active on med list        Passed - No active pregnancy on record        Passed - No positive pregnancy test in last 12 months           topiramate (TOPAMAX) 100 MG tablet 30 tablet 0       Anti-Seizure Meds Protocol  Failed - 12/21/2021 10:59 AM        Failed - Recent (12 mo) or future (30 days) visit within the authorizing provider's specialty     Patient has had an office visit with the authorizing provider or a provider within the authorizing providers department within the previous 12 mos or has a future within next 30 days. See \"Patient Info\" tab in inbasket, or " "\"Choose Columns\" in Meds & Orders section of the refill encounter.              Failed - Review Authorizing provider's last note.      Refer to last progress notes: confirm request is for original authorizing provider (cannot be through other providers).          Failed - Normal CBC on file in past 26 months     Recent Labs   Lab Test 11/16/21  0733   WBC 11.5*   RBC 4.54   HGB 12.1   HCT 39.3                    Passed - Normal ALT or AST on file in past 26 months     Recent Labs   Lab Test 11/12/21  1052   ALT 26     Recent Labs   Lab Test 11/12/21  1052   AST 31             Passed - Normal platelet count on file in past 26 months     Recent Labs   Lab Test 11/16/21  0733                  Passed - Medication is active on med list        Passed - No active pregnancy on record        Passed - No positive pregnancy test in last 12 months           Demetra Kim RN  [facility]     "

## 2021-12-22 RX ORDER — TOPIRAMATE 50 MG/1
TABLET, FILM COATED ORAL
Qty: 30 TABLET | Refills: 0 | Status: SHIPPED | OUTPATIENT
Start: 2021-12-22 | End: 2022-05-05

## 2021-12-22 RX ORDER — TOPIRAMATE 100 MG/1
TABLET, FILM COATED ORAL
Qty: 30 TABLET | Refills: 0 | Status: SHIPPED | OUTPATIENT
Start: 2021-12-22 | End: 2022-05-05

## 2021-12-23 DIAGNOSIS — L30.9 ECZEMA, UNSPECIFIED TYPE: ICD-10-CM

## 2021-12-23 NOTE — TELEPHONE ENCOUNTER
Reason for Call:  Medication or medication refill:    Do you use a [facility]?  Name of the pharmacy and phone number for the current request: [id number] - [place], [place] - [address] AT [address]    Name of the medication requested: Emollient (AQUAPHOR ADVANCED THERAPY) OINT    Other request: N/A    Can we leave a detailed message on this number? YES    Phone number patient can be reached at: Home number on file [phone] (home)    Best Time: Any    Call taken on 12/23/2021 at 1:05 PM by Perla Beatty

## 2021-12-23 NOTE — TELEPHONE ENCOUNTER
Routing refill request to provider for review/approval because:  Drug not on the [facility] refill protocol     Thanks,  IMANI Mccrary  [facility]

## 2022-01-11 ENCOUNTER — VIRTUAL VISIT (OUTPATIENT)
Dept: ONCOLOGY | Facility: CLINIC | Age: 42
End: 2022-01-11
Attending: OBSTETRICS & GYNECOLOGY
Payer: COMMERCIAL

## 2022-01-11 DIAGNOSIS — R87.619 ABNORMAL CERVICAL PAPANICOLAOU SMEAR, UNSPECIFIED ABNORMAL PAP FINDING: Primary | ICD-10-CM

## 2022-01-11 DIAGNOSIS — L30.9 ECZEMA, UNSPECIFIED TYPE: ICD-10-CM

## 2022-01-11 PROCEDURE — 99214 OFFICE O/P EST MOD 30 MIN: CPT | Mod: 95 | Performed by: OBSTETRICS & GYNECOLOGY

## 2022-01-11 PROCEDURE — G0463 HOSPITAL OUTPT CLINIC VISIT: HCPCS | Mod: PN,RTG | Performed by: OBSTETRICS & GYNECOLOGY

## 2022-01-11 NOTE — PATIENT INSTRUCTIONS
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

## 2022-01-11 NOTE — LETTER
1/11/2022         RE: Elizabeth Gil  [address] [place] [address]        Dear Colleague,    Thank you for referring your patient, Elizabeth Gil, to the [facility]. Please see a copy of my visit note below.                              Consult Notes on Referred Patient    Date: 1/11/2022       Patient seen today on video visit for follow-up.    She is status post laparoscopic left salpingo-oophorectomy, pelvic washings, JAIRO x 20 minutes for pelvic mass and HSIL pap smear, HR HPV +.  Pathology (reviewed) showed left hemorrhagic corpus luteal cyst, no malignancy.  Biopsy cervix with CIN3.    The patient is called on video for follow-up. Doing well, has some left back pain that started a couple of days after surgery.  Pain has now improved.  No other symptoms.  Has had some frequent urination.  No blood in urine.  No incisional problems.  Reports that she has been itching on her leg, small rash in this area for which she is using steroid cream.  Has follow-up related to her incontinence with Urology at the end of the month.  Does not feel that her urinary symptoms are any different.  No pain medication.          Past Medical History:    Past Medical History:   Diagnosis Date     Abnormal Pap smear of cervix 10/18/2018    10/18/18: See problem list.      Cervical high risk HPV (human papillomavirus) test positive 10/18/2018, 6/12/19    See problem list     Complex cyst of left ovary      Female stress incontinence      Hypertension      Migraine          Past Surgical History:    Past Surgical History:   Procedure Laterality Date     LAPAROSCOPIC SALPINGO-OOPHORECTOMY N/A 12/7/2021    Procedure: Laparoscopic left salpingo-oophorectomy, Pelvic Washing, Cervical Biopsy, lysis of adhesions <20 mins;  Surgeon: Fouzia Young MD;  Location: [facility] OR     PANNICULECTOMY  2012         Health Maintenance:  Health Maintenance Due   Topic Date Due     ADVANCE CARE PLANNING  Never done      DEPRESSION ACTION PLAN  Never done     Pneumococcal Vaccine: Pediatrics (0 to 5 Years) and At-Risk Patients (6 to 64 Years) (1 of 2 - PPSV23) Never done     HEPATITIS C SCREENING  Never done     COLPOSCOPY  09/13/2019     PREVENTIVE CARE VISIT  10/18/2019     PHQ-9  05/05/2021     INFLUENZA VACCINE (1) 09/01/2021     HPV TEST  04/26/2022     PAP  04/26/2022           Current Medications:     has a current medication list which includes the following prescription(s): acetaminophen, aquaphor advanced therapy, hydrochlorothiazide, ibuprofen, multivitamin adult, phentermine, senna-docusate, topiramate, topiramate, UNABLE TO FIND, and vitamin d3.       Allergies:     Morphine        Social History:     Social History     Tobacco Use     Smoking status: Light Tobacco Smoker     Packs/day: 1.00     Years: 15.00     Pack years: 15.00     Types: Cigarettes     Smokeless tobacco: Never Used   Substance Use Topics     Alcohol use: Yes     Comment: 2-3 week       History   Drug Use No           Family History:     The patient's family history is notable for :    Family History   Problem Relation Age of Onset     Thyroid Disease Mother      Heart Disease Mother      Hypertension Mother      Obesity Mother      Colon Cancer Father      Aneurysm Father      Hypertension Father      Obesity Father      Ovarian Cancer Maternal Aunt      Breast Cancer No family hx of      Uterine Cancer No family hx of          Physical Exam:     There were no vitals taken for this visit.  There is no height or weight on file to calculate BMI.    GENERAL: Healthy, alert and no distress  EYES: Eyes grossly normal to inspection.  No discharge or erythema, or obvious scleral/conjunctival abnormalities.  RESP: No audible wheeze, cough, or visible cyanosis.  No visible retractions or increased work of breathing.    SKIN: Visible skin clear. No significant rash, abnormal pigmentation or lesions.  NEURO: Cranial nerves grossly intact.  Mentation and  speech appropriate for age.  PSYCH: Mentation appears normal, affect normal/bright, judgement and insight intact, normal speech and appearance well-groomed.       Assessment:    Elizabeth Gil is a 41 year old woman status post resection of left ovarian hemorrhagic cyst and HSIL/CIN3      30 minutes spent on the date of the encounter doing chart review, history and exam, documentation and further activities as noted above    TT video 15 min      Plan:     1.)    Status post resection of left ovarian hemorrhagic cyst:  Doing well, pathology reviewed.  No additional treatment needed. Okay to resume normal activities as tolerated     2.) Urinary symtoms:  Related to baseline issues.  Offered UA but patient feels that these are the same as her baseline issues.  She has urology follow-up    3.) CIN3:  HSIL pap, CIN3 on biopsy.  Needs CKC.  She can see Ob/Gyn.  She would prefer me to arrange.  I will contact her to schedule.     Fouzia Young MD  Gynecologic Oncology  [facility]      CC  Patient Care Team:  [facility], [facility] [place] as PCP - General  Phu Varma MD as Assigned Musculoskeletal Provider  Tiffany Sanchez MD as MD (Surgery)  PEYTON Fernandez MD as Referring Physician (Plastic Surgery)  Kirk Redd MD as Assigned OBGYN Provider  Valerie Cortez PA-C as Assigned Surgical Provider  Екатерина Lanier MD as Referring Physician (OB/Gyn)  Danna Enrique APRN CNP as Assigned PCP

## 2022-01-11 NOTE — PROGRESS NOTES
Elizabeth is a 41 year old who is being evaluated via a billable video visit.      Pt has had pain in left side/back after surgery.    How would you like to obtain your AVS? MyChart  If the video visit is dropped, the invitation should be resent by: Text to cell phone: [phone]  Will anyone else be joining your video visit? No       Willow Obando                             Consult Notes on Referred Patient    Date: 1/11/2022       Patient seen today on video visit for follow-up.    She is status post laparoscopic left salpingo-oophorectomy, pelvic washings, JAIRO x 20 minutes for pelvic mass and HSIL pap smear, HR HPV +.  Pathology (reviewed) showed left hemorrhagic corpus luteal cyst, no malignancy.  Biopsy cervix with CIN3.    The patient is called on video for follow-up. Doing well, has some left back pain that started a couple of days after surgery.  Pain has now improved.  No other symptoms.  Has had some frequent urination.  No blood in urine.  No incisional problems.  Reports that she has been itching on her leg, small rash in this area for which she is using steroid cream.  Has follow-up related to her incontinence with Urology at the end of the month.  Does not feel that her urinary symptoms are any different.  No pain medication.          Past Medical History:    Past Medical History:   Diagnosis Date     Abnormal Pap smear of cervix 10/18/2018    10/18/18: See problem list.      Cervical high risk HPV (human papillomavirus) test positive 10/18/2018, 6/12/19    See problem list     Complex cyst of left ovary      Female stress incontinence      Hypertension      Migraine          Past Surgical History:    Past Surgical History:   Procedure Laterality Date     LAPAROSCOPIC SALPINGO-OOPHORECTOMY N/A 12/7/2021    Procedure: Laparoscopic left salpingo-oophorectomy, Pelvic Washing, Cervical Biopsy, lysis of adhesions <20 mins;  Surgeon: Fouzia Young MD;  Location: [facility] OR     PANNICULECTOMY  2012          Health Maintenance:  Health Maintenance Due   Topic Date Due     ADVANCE CARE PLANNING  Never done     DEPRESSION ACTION PLAN  Never done     Pneumococcal Vaccine: Pediatrics (0 to 5 Years) and At-Risk Patients (6 to 64 Years) (1 of 2 - PPSV23) Never done     HEPATITIS C SCREENING  Never done     COLPOSCOPY  09/13/2019     PREVENTIVE CARE VISIT  10/18/2019     PHQ-9  05/05/2021     INFLUENZA VACCINE (1) 09/01/2021     HPV TEST  04/26/2022     PAP  04/26/2022           Current Medications:     has a current medication list which includes the following prescription(s): acetaminophen, aquaphor advanced therapy, hydrochlorothiazide, ibuprofen, multivitamin adult, phentermine, senna-docusate, topiramate, topiramate, UNABLE TO FIND, and vitamin d3.       Allergies:     Morphine        Social History:     Social History     Tobacco Use     Smoking status: Light Tobacco Smoker     Packs/day: 1.00     Years: 15.00     Pack years: 15.00     Types: Cigarettes     Smokeless tobacco: Never Used   Substance Use Topics     Alcohol use: Yes     Comment: 2-3 week       History   Drug Use No           Family History:     The patient's family history is notable for :    Family History   Problem Relation Age of Onset     Thyroid Disease Mother      Heart Disease Mother      Hypertension Mother      Obesity Mother      Colon Cancer Father      Aneurysm Father      Hypertension Father      Obesity Father      Ovarian Cancer Maternal Aunt      Breast Cancer No family hx of      Uterine Cancer No family hx of          Physical Exam:     There were no vitals taken for this visit.  There is no height or weight on file to calculate BMI.    GENERAL: Healthy, alert and no distress  EYES: Eyes grossly normal to inspection.  No discharge or erythema, or obvious scleral/conjunctival abnormalities.  RESP: No audible wheeze, cough, or visible cyanosis.  No visible retractions or increased work of breathing.    SKIN: Visible skin clear. No  significant rash, abnormal pigmentation or lesions.  NEURO: Cranial nerves grossly intact.  Mentation and speech appropriate for age.  PSYCH: Mentation appears normal, affect normal/bright, judgement and insight intact, normal speech and appearance well-groomed.       Assessment:    Elizabeth Gil is a 41 year old woman status post resection of left ovarian hemorrhagic cyst and HSIL/CIN3      30 minutes spent on the date of the encounter doing chart review, history and exam, documentation and further activities as noted above    TT video 15 min      Plan:     1.)    Status post resection of left ovarian hemorrhagic cyst:  Doing well, pathology reviewed.  No additional treatment needed. Okay to resume normal activities as tolerated     2.) Urinary symtoms:  Related to baseline issues.  Offered UA but patient feels that these are the same as her baseline issues.  She has urology follow-up    3.) CIN3:  HSIL pap, CIN3 on biopsy.  Needs CKC.  She can see Ob/Gyn.  She would prefer me to arrange.  I will contact her to schedule.     Fouzia Young MD  Gynecologic Oncology  [facility]      CC  Patient Care Team:  [facility]  Phu Varma MD as Assigned Musculoskeletal Provider  Tiffany Sanchez MD as MD (Surgery)  PEYTON Fernandez MD as Referring Physician (Plastic Surgery)  Kirk Redd MD as Assigned OBGYN Provider  Valerie Cortez PA-C as Assigned Surgical Provider  Fouzia Young MD as MD (Gynecologic Oncology)  Екатерина Lanier MD as Referring Physician (OB/Gyn)  Danna Enrique APRN CNP as Assigned PCP  Fouzia Young MD as Assigned Cancer Care Provider  FOUZIA YOUNG      Video-Visit Details    Type of service:  Video Visit    Originating Location (pt. Location): Home    Distant Location (provider location):  [facility]     Platform used for Video Visit: [employer]

## 2022-01-24 ENCOUNTER — PREP FOR PROCEDURE (OUTPATIENT)
Dept: SURGERY | Facility: CLINIC | Age: 42
End: 2022-01-24
Payer: COMMERCIAL

## 2022-01-24 DIAGNOSIS — D06.9 CARCINOMA IN SITU OF CERVIX, UNSPECIFIED LOCATION: Primary | ICD-10-CM

## 2022-01-24 RX ORDER — ACETAMINOPHEN 325 MG/1
975 TABLET ORAL ONCE
Status: CANCELLED | OUTPATIENT
Start: 2022-01-24 | End: 2022-01-24

## 2022-01-24 NOTE — PROGRESS NOTES
Orders placed for procedure on 2/7/22    Indication:  PASTORA III    Procedure:  Pelvic exam under anesthesia, cold knife conization cervix    Needs labs, PAC, COVID testing, consent day of surgery    Fouzia Young MD  Gynecologic Oncology  [facility]

## 2022-01-25 ENCOUNTER — TELEPHONE (OUTPATIENT)
Dept: ONCOLOGY | Facility: CLINIC | Age: 42
End: 2022-01-25
Payer: COMMERCIAL

## 2022-01-25 ENCOUNTER — PATIENT OUTREACH (OUTPATIENT)
Dept: CARE COORDINATION | Facility: CLINIC | Age: 42
End: 2022-01-25
Payer: COMMERCIAL

## 2022-01-25 NOTE — TELEPHONE ENCOUNTER
FUTURE VISIT INFORMATION      SURGERY INFORMATION:    Date: 22    [facility]    Surgeon:  Fouzia Young MD    Anesthesia Type:  general    Procedure: Pelvic exam under anesthesia, cold knife conization cervix    Consult: virtual visit     RECORDS REQUESTED FROM:        Primary Care Provider: Danna Enrique APRN [facility]  - ealth    Pertinent Medical History: hypertension    Most recent EKG+ Tracin21- [facility]

## 2022-01-25 NOTE — TELEPHONE ENCOUNTER
----- Message from Fouzia Yuong MD sent at 1/24/2022 10:18 AM CST -----  Regarding: Surgery orders  Hi-    I have placed orders for OR on 2/7.  Not sure if this can be added to the [facility] that day      She needs labs, PAC, COVID testing, consent day of surgery    Thanks  JOANNA

## 2022-01-25 NOTE — TELEPHONE ENCOUNTER
RNCC: Jodi Macario; [phone]    Surgery is scheduled with Dr. Young on 2/7 at the [facility] ([facility]) .  Scheduled per orders.    H&P to be completed by PAC video visit on 2/2    COVID-19 test: 2/4 at [facility] with labs    Post-op: 2/22, virtual visit     The RN completed the education regarding the surgery.     Patient will receive surgery arrival and start time from PAC.    The surgery packet was sent via US mail and sent via [employer]    Patient will complete COVID-19 test that was scheduled by surgical coordinator 2-4 days prior to surgery.     Voicemail is full.

## 2022-01-25 NOTE — PROGRESS NOTES
Oncology Distress Screening Follow-up  Clinical Social Work  [facility]    Identified Concern and Score From Distress Screenin. How concerned are you about work and home life issues that may be affected by your cancer?  10 Abnormal      Date of Distress Screenin22    Data: At time of last visit, Patient scored positive on distress screen.  called Patient today with intention of introducing them to psychosocial services and support, and following up on elevated distress.        Intervention/Education Provided: Phone call to patient about distress screening. No answer - message left. Provided contact information in order to reach writer.       Follow-up Required: Will remain available for support and await patient's return call.    ALEJANDRO Pizano,SW  Hematology/Oncology Social Worker  Phone:[phone] Pager: [phone]

## 2022-01-26 DIAGNOSIS — Z11.59 ENCOUNTER FOR SCREENING FOR OTHER VIRAL DISEASES: Primary | ICD-10-CM

## 2022-01-29 ENCOUNTER — HEALTH MAINTENANCE LETTER (OUTPATIENT)
Age: 42
End: 2022-01-29

## 2022-01-31 PROBLEM — R03.0 ELEVATED BP WITHOUT DIAGNOSIS OF HYPERTENSION: Status: RESOLVED | Noted: 2018-10-18 | Resolved: 2022-01-31

## 2022-02-01 NOTE — PROGRESS NOTES
Preoperative Assessment Center Medication History Note    Medication history completed on February 1, 2022 by this writer. See Epic admission navigator for prior to admission medications. Operating room staff will still need to confirm medications and last dose information on day of surgery.     Medication history interview sources  Patient interview: Yes  Care Everywhere records: no  Surescripts pharmacy refill records: Yes  Other (if applicable):     Changes made to PTA medication list  Added: none  Deleted: none  Changed: vitamin D, topiramate sig,     Additional medication history information (including reliability of information, actions taken by pharmacist):    -- No recent (within 30 days) course of antibiotics  -- No recent (within 30 days) course of systemic steroids  -- Patient declines being on any other prescription or over-the-counter medications    Prior to Admission medications    Medication Sig Last Dose Taking? Auth Provider   acetaminophen (TYLENOL) 325 MG tablet Take 2 tablets (650 mg) by mouth every 6 hours as needed for mild pain Taking Yes Fouzia Young MD   Emollient (AQUAPHOR ADVANCED THERAPY) OINT Externally apply topically 3 times daily as needed (itching) Taking Yes Fouzia Young MD   hydrochlorothiazide (HYDRODIURIL) 25 MG tablet TAKE 1 TABLET(25 MG) BY MOUTH DAILY  Patient taking differently: Take 25 mg by mouth every morning  Taking Yes Danna Enrique APRN CNP   ibuprofen (ADVIL/MOTRIN) 600 MG tablet Take 1 tablet (600 mg) by mouth every 6 hours as needed for other (mile and/or inflammatory pain.) Taking Yes Fouzia Young MD   Multiple Vitamin (MULTIVITAMIN ADULT) TABS Take 1 tablet by mouth every morning  Taking Yes Reported, Patient   phentermine (ADIPEX-P) 37.5 MG tablet Take 1 tablet (37.5 mg) by mouth every morning (before breakfast) Taking Yes Danna Enrique APRN CNP   senna-docusate (SENOKOT-S/PERICOLACE) 8.6-50 MG tablet Take 2 tablets by mouth 2 times daily as  needed for constipation Taking Yes Екатерина Temple MD   topiramate (TOPAMAX) 100 MG tablet TAKE ONE TABLET BY MOUTH EVERY NIGHT AT BEDTIME WITH 50MG TABLET  Patient taking differently: TAKE ONE TABLET BY MOUTH EVERY NIGHT AT BEDTIME WITH 50MG TABLET  Takes when needed for migraines. Taking Yes Danna Enrique APRN CNP   topiramate (TOPAMAX) 50 MG tablet TAKE ONE TABLET BY MOUTH EVERY NIGHT AT BEDTIME WITH 100MG TABLET  Patient taking differently: TAKE ONE TABLET BY MOUTH EVERY NIGHT AT BEDTIME WITH 100MG TABLET  Takes when needed for migraines. Taking Yes Danna Enrique APRN CNP   UNABLE TO FIND every morning MEDICATION NAME: Ross Breen daily Taking Yes Reported, Patient   vitamin D3 (CHOLECALCIFEROL) 2000 units (50 mcg) tablet TAKE 2 TABLETS BY MOUTH DAILY  Patient not taking: Reported on 2/1/2022 Not Taking  Danna Enrique APRN CNP          Medication history completed by: Bo Briceño [facility]

## 2022-02-02 ENCOUNTER — ANESTHESIA EVENT (OUTPATIENT)
Dept: SURGERY | Facility: AMBULATORY SURGERY CENTER | Age: 42
End: 2022-02-02
Payer: COMMERCIAL

## 2022-02-02 ENCOUNTER — VIRTUAL VISIT (OUTPATIENT)
Dept: SURGERY | Facility: CLINIC | Age: 42
End: 2022-02-02
Payer: COMMERCIAL

## 2022-02-02 ENCOUNTER — PRE VISIT (OUTPATIENT)
Dept: SURGERY | Facility: CLINIC | Age: 42
End: 2022-02-02
Payer: COMMERCIAL

## 2022-02-02 DIAGNOSIS — Z01.818 PREOP EXAMINATION: Primary | ICD-10-CM

## 2022-02-02 PROCEDURE — 99203 OFFICE O/P NEW LOW 30 MIN: CPT | Mod: 95 | Performed by: PHYSICIAN ASSISTANT

## 2022-02-02 ASSESSMENT — ENCOUNTER SYMPTOMS: SEIZURES: 0

## 2022-02-02 ASSESSMENT — PAIN SCALES - GENERAL: PAINLEVEL: NO PAIN (0)

## 2022-02-02 ASSESSMENT — PATIENT HEALTH QUESTIONNAIRE - PHQ9: SUM OF ALL RESPONSES TO PHQ QUESTIONS 1-9: 9

## 2022-02-02 ASSESSMENT — LIFESTYLE VARIABLES: TOBACCO_USE: 1

## 2022-02-02 NOTE — PATIENT INSTRUCTIONS
Preparing for Your Surgery      Name:  Elizabeth Gil   MRN:  [id number]   :  1980   Today's Date:  2022         Arriving for surgery:  Surgery date:  2022  Arrival time:  8:30 am    Restrictions due to COVID 19:           parking is available for anyone with mobility limitations or disabilities. (Monday- Friday 7 am- 5 pm)    Please come to:    [facility]  [address] [place] [phone]    Please check in on the 5th floor at the Ambulatory Surgery Center       What can I eat or drink?    -  You may eat and drink normally until 8 hours before surgery. (Until 2 am)  -  You may have clear liquids up to 4 hours before surgery. (Until 6 am)  Examples of clear liquids:  Water  Clear broth  Juices (apple, white grape, white cranberry  and cider) without pulp  Noncarbonated, powder based beverages  (lemonade and Jaquan-Aid)  Sodas (Sprite, 7-Up, ginger ale and seltzer)  Coffee or tea (without milk or cream)  Gatorade    --No alcohol for at least 24 hours before surgery    Which medicines can I take?    Hold Aspirin for 7 days before surgery.     Hold Multivitamins for 7 days before surgery.  Hold Supplements (Sea Breen) for 7 days before surgery.    Hold Ibuprofen (Advil, Motrin) for 1 day before surgery.  Hold Naproxen (Aleve) for 4 days before surgery.    Continue to hold phentermine until after surgery        -  PLEASE TAKE the following medications the day of surgery   Tylenol (acetaminophen) if needed   Hydrochlorothiazide       How do I prepare myself?  - Please take 2 showers before surgery using Scrubcare or Hibiclens soap.    Use this soap only from the neck to your toes.     Leave the soap on your skin for one minute--then rinse thoroughly.      You may use your own shampoo and conditioner; no other hair products.   - Please remove all jewelry and body piercings.  - No lotions, deodorants or fragrance.  - No makeup or fingernail polish.   - Bring your  ID and insurance card.    -If you have a Deep Brain Stimulator, a Spinal Cord Stimulator or any implanted Neuro Device you must bring the remote to the Surgery Center         - All patients are required to have a Covid-19 test within 4 days of surgery/procedure.      -Patients will be contacted by the [facility] scheduling team within 1 week of surgery to make an appointment.      - Patients may call the Scheduling team at [phone] if they have not been scheduled within 4 days of  surgery.      ALL PATIENTS ARE REQUIRED TO HAVE A RESPONSIBLE ADULT TO DRIVE AND BE IN ATTENDANCE WITH THEM FOR 24 HOURS FOLLOWING SURGERY       Questions or Concerns:    -For questions regarding the day of surgery please contact the Ambulatory Surgery Center at [phone].    -If you have health changes between today and your surgery please contact your surgeon.     For questions after surgery please call your surgeons office.

## 2022-02-02 NOTE — H&P (VIEW-ONLY)
Pre-Operative H & P     CC:  Preoperative exam to assess for increased cardiopulmonary risk while undergoing surgery and anesthesia.    Date of Encounter: 2/2/2022  Primary Care Physician:  Edwin Duff     Reason for Visit: Carcinoma in situ of cervix, unspecified location     HPI  Elizabeth Gil is a 41 year old female who presents for pre-operative H & P in preparation for  Procedure Information     Case: [id number] Date/Time: 02/07/22 1005    Procedure: Pelvic exam under anesthesia, cold knife conization cervix (N/A Vagina)    Anesthesia type: General    Diagnosis: Carcinoma in situ of cervix, unspecified location [D06.9]    Pre-op diagnosis: Carcinoma in situ of cervix, unspecified location [D06.9]    Location: [place] [address] / [facility]    Providers: Fouzia Young MD          Patient is being evaluated for comorbid conditions of HTN, migraines, social anxiety, urinary stress incontinence, obesity.      Ms. Gil is s/p Laparoscopic left salpingo-oophorectomy, Pelvic Washing, Cervical Biopsy, lysis of adhesions for a pelvic mass on 12/7/21. Pathology (reviewed) showed left hemorrhagic corpus luteal cyst, no malignancy.  Biopsy cervix with CIN3. She now presents for the above procedure.      History was obtained from patient & chart review.     Hx of abnormal bleeding or anti-platelet use: denies    Menstrual history: Patient's last menstrual period was 01/31/2022.:       Past Medical History  Past Medical History:   Diagnosis Date     Abnormal Pap smear of cervix 10/18/2018    10/18/18: See problem list.      Cervical high risk HPV (human papillomavirus) test positive 10/18/2018, 6/12/19    See problem list     Complex cyst of left ovary      Female stress incontinence      Hypertension      Migraine        Past Surgical History  Past Surgical History:   Procedure Laterality Date     LAPAROSCOPIC SALPINGO-OOPHORECTOMY N/A 12/7/2021    Procedure:  Laparoscopic left salpingo-oophorectomy, Pelvic Washing, Cervical Biopsy, lysis of adhesions <20 mins;  Surgeon: Fouzia Young MD;  Location: [facility] OR     PANNICULECTOMY  2012       Prior to Admission Medications  Current Outpatient Medications   Medication Sig Dispense Refill     acetaminophen (TYLENOL) 325 MG tablet Take 2 tablets (650 mg) by mouth every 6 hours as needed for mild pain 24 tablet 0     Emollient (AQUAPHOR ADVANCED THERAPY) OINT Externally apply topically 3 times daily as needed (itching) 85 g 3     hydrochlorothiazide (HYDRODIURIL) 25 MG tablet TAKE 1 TABLET(25 MG) BY MOUTH DAILY (Patient taking differently: Take 25 mg by mouth every morning ) 90 tablet 1     ibuprofen (ADVIL/MOTRIN) 600 MG tablet Take 1 tablet (600 mg) by mouth every 6 hours as needed for other (mile and/or inflammatory pain.) 12 tablet 0     Multiple Vitamin (MULTIVITAMIN ADULT) TABS Take 1 tablet by mouth every morning        phentermine (ADIPEX-P) 37.5 MG tablet Take 1 tablet (37.5 mg) by mouth every morning (before breakfast) 30 tablet 3     senna-docusate (SENOKOT-S/PERICOLACE) 8.6-50 MG tablet Take 2 tablets by mouth 2 times daily as needed for constipation 60 tablet 1     topiramate (TOPAMAX) 100 MG tablet TAKE ONE TABLET BY MOUTH EVERY NIGHT AT BEDTIME WITH 50MG TABLET (Patient taking differently: TAKE ONE TABLET BY MOUTH EVERY NIGHT AT BEDTIME WITH 50MG TABLET  Takes when needed for migraines.) 30 tablet 0     topiramate (TOPAMAX) 50 MG tablet TAKE ONE TABLET BY MOUTH EVERY NIGHT AT BEDTIME WITH 100MG TABLET (Patient taking differently: TAKE ONE TABLET BY MOUTH EVERY NIGHT AT BEDTIME WITH 100MG TABLET  Takes when needed for migraines.) 30 tablet 0     UNABLE TO FIND every morning MEDICATION NAME: Sea Breen daily       vitamin D3 (CHOLECALCIFEROL) 2000 units (50 mcg) tablet TAKE 2 TABLETS BY MOUTH DAILY (Patient not taking: Reported on 2/1/2022) 180 tablet 2       Allergies  Allergies   Allergen Reactions     Morphine  Itching       Social History  Social History     Socioeconomic History     Marital status:      Spouse name: Not on file     Number of children: Not on file     Years of education: Not on file     Highest education level: Not on file   Occupational History     Not on file   Tobacco Use     Smoking status: Light Tobacco Smoker     Packs/day: 1.00     Years: 15.00     Pack years: 15.00     Types: Cigarettes     Smokeless tobacco: Never Used     Tobacco comment: smokes 1/2 ppd (2/2/22)   Substance and Sexual Activity     Alcohol use: Yes     Comment: 2-3 week     Drug use: No     Sexual activity: Yes     Partners: Male   Other Topics Concern     Parent/sibling w/ CABG, MI or angioplasty before 65F 55M? No   Social History Narrative     Not on file     Social Determinants of Health     Financial Resource Strain: Not on file   Food Insecurity: Not on file   Transportation Needs: Not on file   Physical Activity: Not on file   Stress: Not on file   Social Connections: Not on file   Intimate Partner Violence: Not on file   Housing Stability: Not on file       Family History  Family History   Problem Relation Age of Onset     Thyroid Disease Mother      Heart Disease Mother      Hypertension Mother      Obesity Mother      Colon Cancer Father      Aneurysm Father      Hypertension Father      Obesity Father      Ovarian Cancer Maternal Aunt      Breast Cancer No family hx of      Uterine Cancer No family hx of      Anesthesia Reaction No family hx of      Deep Vein Thrombosis (DVT) No family hx of          ROS/MED HX  The complete review of systems is negative other than noted in the HPI or here.  Patient denies recent illness, fever and respiratory infection during past month.  Pt denies steroid use during past year.    ENT/Pulmonary: Comment: 15 pk yr hx, smokes 1/2 ppd    (+) KARY risk factors, snores loudly, observed stopped breathing, tobacco use, Current use, 15  Pack-Year Hx,   (-) asthma   Neurologic: Comment:  Takes topiramate for migraines    (+) migraines,  (-) no seizures   Cardiovascular: Comment: BP in clinic in Sept/Oct/Nov 2021 ranged between 153-165/. BP on DOS on 12/7/21 was 136/93; case proceeded w/o complication,    (+) hypertension-----Previous cardiac testing   Echo: Date: Results:    Stress Test: Date: Results:    ECG Reviewed: Date: 5/1/21 Results:  ST, LVH, ST and T wave changes  Cath: Date: Results:   (-) taking anticoagulants/antiplatelets   METS/Exercise Tolerance: >4 METS Comment: Runs [employer]   Hematologic:  - neg hematologic  ROS  (-) history of blood clots and history of blood transfusion   Musculoskeletal:  - neg musculoskeletal ROS     GI/Hepatic:     (+) GERD,  (-) liver disease   Renal/Genitourinary:  - neg Renal ROS  (-) renal disease   Endo: Comment: Last dose phentermine 1/31/22      (+) Obesity,  (-) Type II DM   Psychiatric/Substance Use:  - neg psychiatric ROS     Infectious Disease:  - neg infectious disease ROS     Malignancy: Comment: Cervical cancer      Other:  - neg other ROS            Virtual visit -  No vitals were obtained    Physical Exam  Constitutional: Awake, alert, cooperative, no apparent distress, and appears stated age.  Eyes: Pupils equal  HENT: Normocephalic  Respiratory: non labored breathing   Neurologic: Awake, alert, oriented to name, place and time.   Neuropsychiatric: Calm, cooperative. Normal affect.          PRIOR LABS/DIAGNOSTIC STUDIES:    All labs and imaging personally reviewed      EKG 5/1/21 Sinus tachycardia, LVH, ST and T changes, nonspecific         ED US Cardiac 5/1/21   Impression: Grossly preserved left ventricular systolic function, No   pericardial effusion identified, No right ventricular dialtion identified   and Technically limited study (ultrasound contrast material not   administered)      LABS/DIAGNOSTIC STUDIES to be collected on 2/4/22:  CMP, CBC, T&S, COVID      The patient's records and results personally reviewed by this  provider.         ASSESSMENT      Elizabeth Gil is a 41 year old female was seen as a PAC referral for risk assessment and optimization for anesthesia.    Plan/Recommendations  See below for details on the assessment, risk, and preoperative recommendations      ANESTHESIA  - Pt has had prior anesthetic.  No history of anesthetic complications.      NEUROLOGY  - No history of TIA, CVA or seizure  - Migraines, taking topiramate    ENT  - No current airway concerns.  Will need to be reassessed day of surgery.    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 BP in clinic in Sept/Oct/Nov 2021 ranged between 153-165/. BP on DOS on 12/7/21 was 136/93; case proceeded w/o complication   - RCRI : No serious cardiac risks.  0.4% risk of major adverse cardiac event.    PULMONARY  - KARY 3/8 = intermediate risk  - Denies asthma or inhaler use  - Tobacco History      History   Smoking Status     Light Tobacco Smoker     Packs/day: 1.00     Years: 15.00     Types: Cigarettes   Smokeless Tobacco     Never Used     Comment: smokes 1/2 ppd (2/2/22)       GI  - GERD, untreated   - Risk of PONV score = 1.  If > 2, anti-emetic intervention recommended.    /RENAL  - s/p Laparoscopic left salpingo-oophorectomy 12/7/22  - Cervical carcinoma    ENDOCRINE  - BMI:  33  - Last dose of phentermine 1/31/22  - No history of Diabetes Mellitus    HEME  - VTE risk: 1.8%  - No history of abnormal bleeding or antiplatelet use.        The patient is optimized for their procedure. AVS with information on surgery time/arrival time, meds and NPO status given by nursing staff. No further diagnostic testing indicated.    Final plan per anesthesiologist on day of surgery.       Video-Visit Details    Type of service:  Video Visit    Patient verbally consented to video service today: YES    Video Start Time: 0926  Video End Time (time video stopped): 0936    Originating Location (pt. Location):  Home    Distant Location (provider location):  [facility] PREOPERATIVE ASSESSMENT CENTER     Mode of Communication:  Video Conference via [employer]  On the day of service:     Prep time: 14 minutes  Visit time: 10 minutes  Documentation time: 10 minutes  ------------------------------------------  Total time: 34 minutes      Carley Cunningham PA-C  Preoperative Assessment Center  [facility]  Clinic and Surgery Center  Phone: [phone]  Fax: [phone]

## 2022-02-02 NOTE — H&P
Pre-Operative H & P     CC:  Preoperative exam to assess for increased cardiopulmonary risk while undergoing surgery and anesthesia.    Date of Encounter: 2/2/2022  Primary Care Physician:  Edwin Duff     Reason for Visit: Carcinoma in situ of cervix, unspecified location     HPI  Elizabeth Gil is a 41 year old female who presents for pre-operative H & P in preparation for  Procedure Information     Case: [id number] Date/Time: 02/07/22 1005    Procedure: Pelvic exam under anesthesia, cold knife conization cervix (N/A Vagina)    Anesthesia type: General    Diagnosis: Carcinoma in situ of cervix, unspecified location [D06.9]    Pre-op diagnosis: Carcinoma in situ of cervix, unspecified location [D06.9]    Location: [place] [address] / [facility] and [facility]    Providers: Fouzia Young MD          Patient is being evaluated for comorbid conditions of HTN, migraines, social anxiety, urinary stress incontinence, obesity.      Ms. Gil is s/p Laparoscopic left salpingo-oophorectomy, Pelvic Washing, Cervical Biopsy, lysis of adhesions for a pelvic mass on 12/7/21. Pathology (reviewed) showed left hemorrhagic corpus luteal cyst, no malignancy.  Biopsy cervix with CIN3. She now presents for the above procedure.      History was obtained from patient & chart review.     Hx of abnormal bleeding or anti-platelet use: denies    Menstrual history: Patient's last menstrual period was 01/31/2022.:       Past Medical History  Past Medical History:   Diagnosis Date     Abnormal Pap smear of cervix 10/18/2018    10/18/18: See problem list.      Cervical high risk HPV (human papillomavirus) test positive 10/18/2018, 6/12/19    See problem list     Complex cyst of left ovary      Female stress incontinence      Hypertension      Migraine        Past Surgical History  Past Surgical History:   Procedure Laterality Date     LAPAROSCOPIC SALPINGO-OOPHORECTOMY N/A 12/7/2021    Procedure:  Laparoscopic left salpingo-oophorectomy, Pelvic Washing, Cervical Biopsy, lysis of adhesions <20 mins;  Surgeon: Fouzia Young MD;  Location: [facility] OR     PANNICULECTOMY  2012       Prior to Admission Medications  Current Outpatient Medications   Medication Sig Dispense Refill     acetaminophen (TYLENOL) 325 MG tablet Take 2 tablets (650 mg) by mouth every 6 hours as needed for mild pain 24 tablet 0     Emollient (AQUAPHOR ADVANCED THERAPY) OINT Externally apply topically 3 times daily as needed (itching) 85 g 3     hydrochlorothiazide (HYDRODIURIL) 25 MG tablet TAKE 1 TABLET(25 MG) BY MOUTH DAILY (Patient taking differently: Take 25 mg by mouth every morning ) 90 tablet 1     ibuprofen (ADVIL/MOTRIN) 600 MG tablet Take 1 tablet (600 mg) by mouth every 6 hours as needed for other (mile and/or inflammatory pain.) 12 tablet 0     Multiple Vitamin (MULTIVITAMIN ADULT) TABS Take 1 tablet by mouth every morning        phentermine (ADIPEX-P) 37.5 MG tablet Take 1 tablet (37.5 mg) by mouth every morning (before breakfast) 30 tablet 3     senna-docusate (SENOKOT-S/PERICOLACE) 8.6-50 MG tablet Take 2 tablets by mouth 2 times daily as needed for constipation 60 tablet 1     topiramate (TOPAMAX) 100 MG tablet TAKE ONE TABLET BY MOUTH EVERY NIGHT AT BEDTIME WITH 50MG TABLET (Patient taking differently: TAKE ONE TABLET BY MOUTH EVERY NIGHT AT BEDTIME WITH 50MG TABLET  Takes when needed for migraines.) 30 tablet 0     topiramate (TOPAMAX) 50 MG tablet TAKE ONE TABLET BY MOUTH EVERY NIGHT AT BEDTIME WITH 100MG TABLET (Patient taking differently: TAKE ONE TABLET BY MOUTH EVERY NIGHT AT BEDTIME WITH 100MG TABLET  Takes when needed for migraines.) 30 tablet 0     UNABLE TO FIND every morning MEDICATION NAME: Sea Breen daily       vitamin D3 (CHOLECALCIFEROL) 2000 units (50 mcg) tablet TAKE 2 TABLETS BY MOUTH DAILY (Patient not taking: Reported on 2/1/2022) 180 tablet 2       Allergies  Allergies   Allergen Reactions     Morphine  Itching       Social History  Social History     Socioeconomic History     Marital status:      Spouse name: Not on file     Number of children: Not on file     Years of education: Not on file     Highest education level: Not on file   Occupational History     Not on file   Tobacco Use     Smoking status: Light Tobacco Smoker     Packs/day: 1.00     Years: 15.00     Pack years: 15.00     Types: Cigarettes     Smokeless tobacco: Never Used     Tobacco comment: smokes 1/2 ppd (2/2/22)   Substance and Sexual Activity     Alcohol use: Yes     Comment: 2-3 week     Drug use: No     Sexual activity: Yes     Partners: Male   Other Topics Concern     Parent/sibling w/ CABG, MI or angioplasty before 65F 55M? No   Social History Narrative     Not on file     Social Determinants of Health     Financial Resource Strain: Not on file   Food Insecurity: Not on file   Transportation Needs: Not on file   Physical Activity: Not on file   Stress: Not on file   Social Connections: Not on file   Intimate Partner Violence: Not on file   Housing Stability: Not on file       Family History  Family History   Problem Relation Age of Onset     Thyroid Disease Mother      Heart Disease Mother      Hypertension Mother      Obesity Mother      Colon Cancer Father      Aneurysm Father      Hypertension Father      Obesity Father      Ovarian Cancer Maternal Aunt      Breast Cancer No family hx of      Uterine Cancer No family hx of      Anesthesia Reaction No family hx of      Deep Vein Thrombosis (DVT) No family hx of          ROS/MED HX  The complete review of systems is negative other than noted in the HPI or here.  Patient denies recent illness, fever and respiratory infection during past month.  Pt denies steroid use during past year.    ENT/Pulmonary: Comment: 15 pk yr hx, smokes 1/2 ppd    (+) KARY risk factors, snores loudly, observed stopped breathing, tobacco use, Current use, 15  Pack-Year Hx,   (-) asthma   Neurologic: Comment:  Takes topiramate for migraines    (+) migraines,  (-) no seizures   Cardiovascular: Comment: BP in clinic in Sept/Oct/Nov 2021 ranged between 153-165/. BP on DOS on 12/7/21 was 136/93; case proceeded w/o complication,    (+) hypertension-----Previous cardiac testing   Echo: Date: Results:    Stress Test: Date: Results:    ECG Reviewed: Date: 5/1/21 Results:  ST, LVH, ST and T wave changes  Cath: Date: Results:   (-) taking anticoagulants/antiplatelets   METS/Exercise Tolerance: >4 METS Comment: Runs [employer]   Hematologic:  - neg hematologic  ROS  (-) history of blood clots and history of blood transfusion   Musculoskeletal:  - neg musculoskeletal ROS     GI/Hepatic:     (+) GERD,  (-) liver disease   Renal/Genitourinary:  - neg Renal ROS  (-) renal disease   Endo: Comment: Last dose phentermine 1/31/22      (+) Obesity,  (-) Type II DM   Psychiatric/Substance Use:  - neg psychiatric ROS     Infectious Disease:  - neg infectious disease ROS     Malignancy: Comment: Cervical cancer      Other:  - neg other ROS            Virtual visit -  No vitals were obtained    Physical Exam  Constitutional: Awake, alert, cooperative, no apparent distress, and appears stated age.  Eyes: Pupils equal  HENT: Normocephalic  Respiratory: non labored breathing   Neurologic: Awake, alert, oriented to name, place and time.   Neuropsychiatric: Calm, cooperative. Normal affect.          PRIOR LABS/DIAGNOSTIC STUDIES:    All labs and imaging personally reviewed      EKG 5/1/21 Sinus tachycardia, LVH, ST and T changes, nonspecific         ED US Cardiac 5/1/21   Impression: Grossly preserved left ventricular systolic function, No   pericardial effusion identified, No right ventricular dialtion identified   and Technically limited study (ultrasound contrast material not   administered)      LABS/DIAGNOSTIC STUDIES to be collected on 2/4/22:  CMP, CBC, T&S, COVID      The patient's records and results personally reviewed by this  provider.         ASSESSMENT      Elizabeth Gil is a 41 year old female was seen as a PAC referral for risk assessment and optimization for anesthesia.    Plan/Recommendations  See below for details on the assessment, risk, and preoperative recommendations      ANESTHESIA  - Pt has had prior anesthetic.  No history of anesthetic complications.      NEUROLOGY  - No history of TIA, CVA or seizure  - Migraines, taking topiramate    ENT  - No current airway concerns.  Will need to be reassessed day of surgery.    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 BP in clinic in Sept/Oct/Nov 2021 ranged between 153-165/. BP on DOS on 12/7/21 was 136/93; case proceeded w/o complication   - RCRI : No serious cardiac risks.  0.4% risk of major adverse cardiac event.    PULMONARY  - KARY 3/8 = intermediate risk  - Denies asthma or inhaler use  - Tobacco History      History   Smoking Status     Light Tobacco Smoker     Packs/day: 1.00     Years: 15.00     Types: Cigarettes   Smokeless Tobacco     Never Used     Comment: smokes 1/2 ppd (2/2/22)       GI  - GERD, untreated   - Risk of PONV score = 1.  If > 2, anti-emetic intervention recommended.    /RENAL  - s/p Laparoscopic left salpingo-oophorectomy 12/7/22  - Cervical carcinoma    ENDOCRINE  - BMI:  33  - Last dose of phentermine 1/31/22  - No history of Diabetes Mellitus    HEME  - VTE risk: 1.8%  - No history of abnormal bleeding or antiplatelet use.        The patient is optimized for their procedure. AVS with information on surgery time/arrival time, meds and NPO status given by nursing staff. No further diagnostic testing indicated.    Final plan per anesthesiologist on day of surgery.       Video-Visit Details    Type of service:  Video Visit    Patient verbally consented to video service today: YES    Video Start Time: 0926  Video End Time (time video stopped): 0936    Originating Location (pt. Location):  Home    Distant Location (provider location):  [facility] PREOPERATIVE ASSESSMENT CENTER     Mode of Communication:  Video Conference via [employer]  On the day of service:     Prep time: 14 minutes  Visit time: 10 minutes  Documentation time: 10 minutes  ------------------------------------------  Total time: 34 minutes      Carley Cunningham PA-C  Preoperative Assessment Center  [facility]  Clinic and Surgery Center  Phone: [phone]  Fax: [phone]

## 2022-02-02 NOTE — ANESTHESIA PREPROCEDURE EVALUATION
Anesthesia Pre-Procedure Evaluation    Patient: Elizabeth Gil   MRN: [id number] : 1980        Preoperative Diagnosis: Carcinoma in situ of cervix, unspecified location [D06.9]    Procedure : Procedure(s):  Pelvic exam under anesthesia, cold knife conization cervix  Video Visit       Past Medical History:   Diagnosis Date     Abnormal Pap smear of cervix 10/18/2018    10/18/18: See problem list.      Cervical high risk HPV (human papillomavirus) test positive 10/18/2018, 19    See problem list     Complex cyst of left ovary      Female stress incontinence      Hypertension      Migraine       Past Surgical History:   Procedure Laterality Date     LAPAROSCOPIC SALPINGO-OOPHORECTOMY N/A 2021    Procedure: Laparoscopic left salpingo-oophorectomy, Pelvic Washing, Cervical Biopsy, lysis of adhesions <20 mins;  Surgeon: Fouzia Young MD;  Location: [facility] OR     PANNICULECTOMY        Allergies   Allergen Reactions     Morphine Itching      Social History     Tobacco Use     Smoking status: Light Tobacco Smoker     Packs/day: 1.00     Years: 15.00     Pack years: 15.00     Types: Cigarettes     Smokeless tobacco: Never Used     Tobacco comment: smokes 1/2 ppd (22)   Substance Use Topics     Alcohol use: Yes     Comment: 2-3 week      Wt Readings from Last 1 Encounters:   21 85.5 kg (188 lb 7.9 oz)        Anesthesia Evaluation   Pt has had prior anesthetic. Type: General.    No history of anesthetic complications       ROS/MED HX  ENT/Pulmonary: Comment: 15 pk yr hx, smokes 1/2 ppd    (+) KARY risk factors, snores loudly, observed stopped breathing, tobacco use, Current use, 15  Pack-Year Hx,   (-) asthma   Neurologic: Comment: Takes topiramate for migraines    (+) migraines,  (-) no seizures   Cardiovascular: Comment: BP in clinic in Sept/Oct/2021 ranged between 153-165/. BP on DOS on 21 was 136/93; case proceeded w/o complication,    (+) hypertension-----Previous cardiac  testing   Echo: Date: Results:    Stress Test: Date: Results:    ECG Reviewed: Date: 5/1/21 Results:  ST, LVH, ST and T wave changes  Cath: Date: Results:   (-) taking anticoagulants/antiplatelets   METS/Exercise Tolerance: >4 METS Comment: [employer] [facility]   Hematologic:  - neg hematologic  ROS  (-) history of blood clots and history of blood transfusion   Musculoskeletal:  - neg musculoskeletal ROS     GI/Hepatic:     (+) GERD,  (-) liver disease   Renal/Genitourinary:  - neg Renal ROS  (-) renal disease   Endo: Comment: Last dose phentermine 1/31/22      (+) Obesity,  (-) Type II DM   Psychiatric/Substance Use:  - neg psychiatric ROS     Infectious Disease:  - neg infectious disease ROS     Malignancy: Comment: Cervical cancer      Other:  - neg other ROS          Physical Exam    Airway   unable to assess     Mallampati: II   TM distance: > 3 FB   Neck ROM: full   Mouth opening: > 3 cm    Respiratory Devices and Support         Dental  no notable dental history         Cardiovascular   cardiovascular exam normal          Pulmonary   pulmonary exam normal                OUTSIDE LABS:  CBC:   Lab Results   Component Value Date    WBC 11.5 (H) 11/16/2021    WBC 12.9 (H) 11/12/2021    HGB 12.1 11/16/2021    HGB 12.6 11/12/2021    HCT 39.3 11/16/2021    HCT 40.1 11/12/2021     11/16/2021     11/12/2021     BMP:   Lab Results   Component Value Date     11/12/2021     10/16/2021    POTASSIUM 4.0 11/12/2021    POTASSIUM 3.8 10/16/2021    CHLORIDE 106 11/12/2021    CHLORIDE 106 10/16/2021    CO2 28 11/12/2021    CO2 25 10/16/2021    BUN 12 11/12/2021    BUN 9 10/16/2021    CR 0.58 11/12/2021    CR 0.65 10/16/2021    GLC 91 12/07/2021    GLC 94 11/16/2021     COAGS: No results found for: PTT, INR, FIBR  POC:   Lab Results   Component Value Date    HCGS Negative 10/16/2021     HEPATIC:   Lab Results   Component Value Date    ALBUMIN 3.5 11/12/2021    PROTTOTAL 7.7 11/12/2021    ALT 26  11/12/2021    AST 31 11/12/2021    ALKPHOS 96 11/12/2021    BILITOTAL 0.3 11/12/2021     OTHER:   Lab Results   Component Value Date    A1C 5.6 11/05/2020    MANDEEP 9.5 11/12/2021    LIPASE 84 10/16/2021    TSH 1.81 11/05/2020       Anesthesia Plan    ASA Status:  2   NPO Status:  NPO Appropriate    Anesthesia Type: General.     - Airway: LMA   Induction: Intravenous.   Maintenance: Balanced.        Consents    Anesthesia Plan(s) and associated risks, benefits, and realistic alternatives discussed. Questions answered and patient/representative(s) expressed understanding.     - Discussed: Risks, Benefits and Alternatives for BOTH SEDATION and the PROCEDURE were discussed     - Discussed with:  Patient      - Extended Intubation/Ventilatory Support Discussed: No.      - Patient is DNR/DNI Status: No    Use of blood products discussed: No .     Postoperative Care    Pain management: IV analgesics, Oral pain medications.   PONV prophylaxis: Ondansetron (or other 5HT-3), Dexamethasone or Solumedrol, Background Propofol Infusion     Comments:                Carley Cunningham PA-C

## 2022-02-02 NOTE — PROGRESS NOTES
Elizabeth is a 41 year old who is being evaluated via a billable video visit.      How would you like to obtain your AVS? MyChart  HPI         Review of Systems         Objective    Vitals - Patient Reported  Pain Score: No Pain (0)        Physical Exam   CONNOR Goodson LPN

## 2022-02-04 ENCOUNTER — LAB (OUTPATIENT)
Dept: LAB | Facility: CLINIC | Age: 42
End: 2022-02-04
Payer: COMMERCIAL

## 2022-02-04 ENCOUNTER — LAB (OUTPATIENT)
Dept: LAB | Facility: CLINIC | Age: 42
End: 2022-02-04

## 2022-02-04 DIAGNOSIS — D06.9 CARCINOMA IN SITU OF CERVIX, UNSPECIFIED LOCATION: ICD-10-CM

## 2022-02-04 DIAGNOSIS — Z11.59 ENCOUNTER FOR SCREENING FOR OTHER VIRAL DISEASES: ICD-10-CM

## 2022-02-04 DIAGNOSIS — R19.00 PELVIC MASS: ICD-10-CM

## 2022-02-04 LAB
ABO/RH(D): NORMAL
ANTIBODY SCREEN: NEGATIVE
BASOPHILS # BLD AUTO: 0 10E3/UL (ref 0–0.2)
BASOPHILS NFR BLD AUTO: 1 %
CANCER AG125 SERPL-ACNC: <5 U/ML (ref 0–30)
EOSINOPHIL # BLD AUTO: 0.1 10E3/UL (ref 0–0.7)
EOSINOPHIL NFR BLD AUTO: 1 %
ERYTHROCYTE [DISTWIDTH] IN BLOOD BY AUTOMATED COUNT: 16.4 % (ref 10–15)
HCT VFR BLD AUTO: 41 % (ref 35–47)
HGB BLD-MCNC: 13.7 G/DL (ref 11.7–15.7)
LYMPHOCYTES # BLD AUTO: 3 10E3/UL (ref 0.8–5.3)
LYMPHOCYTES NFR BLD AUTO: 36 %
MCH RBC QN AUTO: 26.7 PG (ref 26.5–33)
MCHC RBC AUTO-ENTMCNC: 33.4 G/DL (ref 31.5–36.5)
MCV RBC AUTO: 80 FL (ref 78–100)
MONOCYTES # BLD AUTO: 0.6 10E3/UL (ref 0–1.3)
MONOCYTES NFR BLD AUTO: 7 %
NEUTROPHILS # BLD AUTO: 4.8 10E3/UL (ref 1.6–8.3)
NEUTROPHILS NFR BLD AUTO: 56 %
PLATELET # BLD AUTO: 294 10E3/UL (ref 150–450)
RBC # BLD AUTO: 5.14 10E6/UL (ref 3.8–5.2)
SPECIMEN EXPIRATION DATE: NORMAL
WBC # BLD AUTO: 8.4 10E3/UL (ref 4–11)

## 2022-02-04 PROCEDURE — U0005 INFEC AGEN DETEC AMPLI PROBE: HCPCS

## 2022-02-04 PROCEDURE — 86304 IMMUNOASSAY TUMOR CA 125: CPT

## 2022-02-04 PROCEDURE — 80053 COMPREHEN METABOLIC PANEL: CPT

## 2022-02-04 PROCEDURE — 86850 RBC ANTIBODY SCREEN: CPT

## 2022-02-04 PROCEDURE — U0003 INFECTIOUS AGENT DETECTION BY NUCLEIC ACID (DNA OR RNA); SEVERE ACUTE RESPIRATORY SYNDROME CORONAVIRUS 2 (SARS-COV-2) (CORONAVIRUS DISEASE [COVID-19]), AMPLIFIED PROBE TECHNIQUE, MAKING USE OF HIGH THROUGHPUT TECHNOLOGIES AS DESCRIBED BY CMS-2020-01-R: HCPCS

## 2022-02-04 PROCEDURE — 36415 COLL VENOUS BLD VENIPUNCTURE: CPT

## 2022-02-04 PROCEDURE — 86901 BLOOD TYPING SEROLOGIC RH(D): CPT

## 2022-02-04 PROCEDURE — 86900 BLOOD TYPING SEROLOGIC ABO: CPT

## 2022-02-04 PROCEDURE — 85025 COMPLETE CBC W/AUTO DIFF WBC: CPT

## 2022-02-05 LAB
ALBUMIN SERPL-MCNC: 3.7 G/DL (ref 3.4–5)
ALP SERPL-CCNC: 73 U/L (ref 40–150)
ALT SERPL W P-5'-P-CCNC: 28 U/L (ref 0–50)
ANION GAP SERPL CALCULATED.3IONS-SCNC: 4 MMOL/L (ref 3–14)
AST SERPL W P-5'-P-CCNC: 23 U/L (ref 0–45)
BILIRUB SERPL-MCNC: 0.2 MG/DL (ref 0.2–1.3)
BUN SERPL-MCNC: 7 MG/DL (ref 7–30)
CALCIUM SERPL-MCNC: 8.8 MG/DL (ref 8.5–10.1)
CHLORIDE BLD-SCNC: 105 MMOL/L (ref 94–109)
CO2 SERPL-SCNC: 27 MMOL/L (ref 20–32)
CREAT SERPL-MCNC: 0.62 MG/DL (ref 0.52–1.04)
GFR SERPL CREATININE-BSD FRML MDRD: >90 ML/MIN/1.73M2
GLUCOSE BLD-MCNC: 93 MG/DL (ref 70–99)
POTASSIUM BLD-SCNC: 4 MMOL/L (ref 3.4–5.3)
PROT SERPL-MCNC: 7.5 G/DL (ref 6.8–8.8)
SARS-COV-2 RNA RESP QL NAA+PROBE: NEGATIVE
SODIUM SERPL-SCNC: 136 MMOL/L (ref 133–144)

## 2022-02-07 ENCOUNTER — HOSPITAL ENCOUNTER (OUTPATIENT)
Facility: AMBULATORY SURGERY CENTER | Age: 42
End: 2022-02-07
Attending: OBSTETRICS & GYNECOLOGY
Payer: COMMERCIAL

## 2022-02-07 ENCOUNTER — ANESTHESIA (OUTPATIENT)
Dept: SURGERY | Facility: AMBULATORY SURGERY CENTER | Age: 42
End: 2022-02-07
Payer: COMMERCIAL

## 2022-02-07 VITALS
RESPIRATION RATE: 14 BRPM | OXYGEN SATURATION: 96 % | TEMPERATURE: 97.5 F | WEIGHT: 188 LBS | BODY MASS INDEX: 33.31 KG/M2 | HEIGHT: 63 IN | HEART RATE: 78 BPM | SYSTOLIC BLOOD PRESSURE: 144 MMHG | DIASTOLIC BLOOD PRESSURE: 102 MMHG

## 2022-02-07 DIAGNOSIS — D06.9 CARCINOMA IN SITU OF CERVIX, UNSPECIFIED LOCATION: Primary | ICD-10-CM

## 2022-02-07 DIAGNOSIS — D06.9 CARCINOMA IN SITU OF CERVIX, UNSPECIFIED LOCATION: ICD-10-CM

## 2022-02-07 LAB
HCG UR QL: NEGATIVE
INTERNAL QC OK POCT: NORMAL
POCT KIT EXPIRATION DATE: NORMAL
POCT KIT LOT NUMBER: NORMAL

## 2022-02-07 PROCEDURE — 88305 TISSUE EXAM BY PATHOLOGIST: CPT | Mod: 26 | Performed by: PATHOLOGY

## 2022-02-07 PROCEDURE — 81025 URINE PREGNANCY TEST: CPT | Performed by: PATHOLOGY

## 2022-02-07 PROCEDURE — 57520 CONIZATION OF CERVIX: CPT

## 2022-02-07 PROCEDURE — 88307 TISSUE EXAM BY PATHOLOGIST: CPT | Mod: 26 | Performed by: PATHOLOGY

## 2022-02-07 PROCEDURE — 88305 TISSUE EXAM BY PATHOLOGIST: CPT | Mod: TC | Performed by: OBSTETRICS & GYNECOLOGY

## 2022-02-07 RX ORDER — FENTANYL CITRATE 50 UG/ML
INJECTION, SOLUTION INTRAMUSCULAR; INTRAVENOUS PRN
Status: DISCONTINUED | OUTPATIENT
Start: 2022-02-07 | End: 2022-02-07

## 2022-02-07 RX ORDER — OXYCODONE HYDROCHLORIDE 5 MG/1
5 TABLET ORAL EVERY 4 HOURS PRN
Status: DISCONTINUED | OUTPATIENT
Start: 2022-02-07 | End: 2022-02-08 | Stop reason: HOSPADM

## 2022-02-07 RX ORDER — ACETAMINOPHEN 325 MG/1
975 TABLET ORAL ONCE
Status: COMPLETED | OUTPATIENT
Start: 2022-02-07 | End: 2022-02-07

## 2022-02-07 RX ORDER — GLYCOPYRROLATE 0.2 MG/ML
INJECTION, SOLUTION INTRAMUSCULAR; INTRAVENOUS PRN
Status: DISCONTINUED | OUTPATIENT
Start: 2022-02-07 | End: 2022-02-07

## 2022-02-07 RX ORDER — PROPOFOL 10 MG/ML
INJECTION, EMULSION INTRAVENOUS CONTINUOUS PRN
Status: DISCONTINUED | OUTPATIENT
Start: 2022-02-07 | End: 2022-02-07

## 2022-02-07 RX ORDER — IBUPROFEN 800 MG/1
800 TABLET, FILM COATED ORAL EVERY 6 HOURS PRN
Qty: 12 TABLET | Refills: 0 | Status: SHIPPED | OUTPATIENT
Start: 2022-02-07

## 2022-02-07 RX ORDER — ONDANSETRON 4 MG/1
4 TABLET, ORALLY DISINTEGRATING ORAL EVERY 30 MIN PRN
Status: DISCONTINUED | OUTPATIENT
Start: 2022-02-07 | End: 2022-02-08 | Stop reason: HOSPADM

## 2022-02-07 RX ORDER — PROPOFOL 10 MG/ML
INJECTION, EMULSION INTRAVENOUS PRN
Status: DISCONTINUED | OUTPATIENT
Start: 2022-02-07 | End: 2022-02-07

## 2022-02-07 RX ORDER — OXYCODONE HYDROCHLORIDE 5 MG/1
5 TABLET ORAL EVERY 8 HOURS PRN
Qty: 6 TABLET | Refills: 0 | Status: SHIPPED | OUTPATIENT
Start: 2022-02-07 | End: 2022-02-07

## 2022-02-07 RX ORDER — SODIUM CHLORIDE, SODIUM LACTATE, POTASSIUM CHLORIDE, CALCIUM CHLORIDE 600; 310; 30; 20 MG/100ML; MG/100ML; MG/100ML; MG/100ML
INJECTION, SOLUTION INTRAVENOUS CONTINUOUS
Status: DISCONTINUED | OUTPATIENT
Start: 2022-02-07 | End: 2022-02-07 | Stop reason: HOSPADM

## 2022-02-07 RX ORDER — IODINE AND POTASSIUM IODIDE 50; 100 MG/ML; MG/ML
LIQUID ORAL PRN
Status: DISCONTINUED | OUTPATIENT
Start: 2022-02-07 | End: 2022-02-07 | Stop reason: HOSPADM

## 2022-02-07 RX ORDER — ACETAMINOPHEN 325 MG/1
975 TABLET ORAL ONCE
Status: DISCONTINUED | OUTPATIENT
Start: 2022-02-07 | End: 2022-02-08 | Stop reason: HOSPADM

## 2022-02-07 RX ORDER — IBUPROFEN 200 MG
800 TABLET ORAL ONCE
Status: DISCONTINUED | OUTPATIENT
Start: 2022-02-07 | End: 2022-02-08 | Stop reason: HOSPADM

## 2022-02-07 RX ORDER — FENTANYL CITRATE 50 UG/ML
25 INJECTION, SOLUTION INTRAMUSCULAR; INTRAVENOUS
Status: DISCONTINUED | OUTPATIENT
Start: 2022-02-07 | End: 2022-02-08 | Stop reason: HOSPADM

## 2022-02-07 RX ORDER — LIDOCAINE HYDROCHLORIDE AND EPINEPHRINE 10; 10 MG/ML; UG/ML
INJECTION, SOLUTION INFILTRATION; PERINEURAL PRN
Status: DISCONTINUED | OUTPATIENT
Start: 2022-02-07 | End: 2022-02-07 | Stop reason: HOSPADM

## 2022-02-07 RX ORDER — HYDROMORPHONE HYDROCHLORIDE 1 MG/ML
0.2 INJECTION, SOLUTION INTRAMUSCULAR; INTRAVENOUS; SUBCUTANEOUS EVERY 5 MIN PRN
Status: DISCONTINUED | OUTPATIENT
Start: 2022-02-07 | End: 2022-02-07 | Stop reason: HOSPADM

## 2022-02-07 RX ORDER — MEPERIDINE HYDROCHLORIDE 25 MG/ML
12.5 INJECTION INTRAMUSCULAR; INTRAVENOUS; SUBCUTANEOUS
Status: DISCONTINUED | OUTPATIENT
Start: 2022-02-07 | End: 2022-02-08 | Stop reason: HOSPADM

## 2022-02-07 RX ORDER — LIDOCAINE HYDROCHLORIDE 20 MG/ML
INJECTION, SOLUTION INFILTRATION; PERINEURAL PRN
Status: DISCONTINUED | OUTPATIENT
Start: 2022-02-07 | End: 2022-02-07

## 2022-02-07 RX ORDER — DEXAMETHASONE SODIUM PHOSPHATE 4 MG/ML
INJECTION, SOLUTION INTRA-ARTICULAR; INTRALESIONAL; INTRAMUSCULAR; INTRAVENOUS; SOFT TISSUE PRN
Status: DISCONTINUED | OUTPATIENT
Start: 2022-02-07 | End: 2022-02-07

## 2022-02-07 RX ORDER — KETOROLAC TROMETHAMINE 30 MG/ML
INJECTION, SOLUTION INTRAMUSCULAR; INTRAVENOUS PRN
Status: DISCONTINUED | OUTPATIENT
Start: 2022-02-07 | End: 2022-02-07

## 2022-02-07 RX ORDER — LIDOCAINE 40 MG/G
CREAM TOPICAL
Status: DISCONTINUED | OUTPATIENT
Start: 2022-02-07 | End: 2022-02-07 | Stop reason: HOSPADM

## 2022-02-07 RX ORDER — ONDANSETRON 2 MG/ML
INJECTION INTRAMUSCULAR; INTRAVENOUS PRN
Status: DISCONTINUED | OUTPATIENT
Start: 2022-02-07 | End: 2022-02-07

## 2022-02-07 RX ORDER — SODIUM CHLORIDE, SODIUM LACTATE, POTASSIUM CHLORIDE, CALCIUM CHLORIDE 600; 310; 30; 20 MG/100ML; MG/100ML; MG/100ML; MG/100ML
INJECTION, SOLUTION INTRAVENOUS CONTINUOUS
Status: DISCONTINUED | OUTPATIENT
Start: 2022-02-07 | End: 2022-02-08 | Stop reason: HOSPADM

## 2022-02-07 RX ORDER — OXYCODONE HYDROCHLORIDE 5 MG/1
5 TABLET ORAL EVERY 8 HOURS PRN
Qty: 6 TABLET | Refills: 0 | Status: SHIPPED | OUTPATIENT
Start: 2022-02-07 | End: 2022-05-02

## 2022-02-07 RX ORDER — CEFAZOLIN SODIUM 2 G/50ML
2 SOLUTION INTRAVENOUS
Status: COMPLETED | OUTPATIENT
Start: 2022-02-07 | End: 2022-02-07

## 2022-02-07 RX ORDER — CEFAZOLIN SODIUM 2 G/50ML
2 SOLUTION INTRAVENOUS SEE ADMIN INSTRUCTIONS
Status: DISCONTINUED | OUTPATIENT
Start: 2022-02-07 | End: 2022-02-07 | Stop reason: HOSPADM

## 2022-02-07 RX ORDER — GABAPENTIN 300 MG/1
300 CAPSULE ORAL
Status: COMPLETED | OUTPATIENT
Start: 2022-02-07 | End: 2022-02-07

## 2022-02-07 RX ORDER — OXYCODONE HYDROCHLORIDE 5 MG/1
5-10 TABLET ORAL EVERY 4 HOURS PRN
Qty: 6 TABLET | Refills: 0 | Status: SHIPPED | OUTPATIENT
Start: 2022-02-07 | End: 2022-02-07

## 2022-02-07 RX ORDER — ACETAMINOPHEN 325 MG/1
975 TABLET ORAL ONCE
Status: DISCONTINUED | OUTPATIENT
Start: 2022-02-07 | End: 2022-02-07 | Stop reason: HOSPADM

## 2022-02-07 RX ORDER — OXYCODONE HYDROCHLORIDE 5 MG/1
5 TABLET ORAL
Status: DISCONTINUED | OUTPATIENT
Start: 2022-02-07 | End: 2022-02-08 | Stop reason: HOSPADM

## 2022-02-07 RX ORDER — ONDANSETRON 2 MG/ML
4 INJECTION INTRAMUSCULAR; INTRAVENOUS EVERY 30 MIN PRN
Status: DISCONTINUED | OUTPATIENT
Start: 2022-02-07 | End: 2022-02-08 | Stop reason: HOSPADM

## 2022-02-07 RX ORDER — FENTANYL CITRATE 50 UG/ML
25 INJECTION, SOLUTION INTRAMUSCULAR; INTRAVENOUS EVERY 5 MIN PRN
Status: DISCONTINUED | OUTPATIENT
Start: 2022-02-07 | End: 2022-02-07 | Stop reason: HOSPADM

## 2022-02-07 RX ORDER — ACETAMINOPHEN 325 MG/1
975 TABLET ORAL EVERY 6 HOURS PRN
Qty: 24 TABLET | Refills: 0 | Status: SHIPPED | OUTPATIENT
Start: 2022-02-07

## 2022-02-07 RX ADMIN — PROPOFOL 200 MG: 10 INJECTION, EMULSION INTRAVENOUS at 09:25

## 2022-02-07 RX ADMIN — ONDANSETRON 4 MG: 2 INJECTION INTRAMUSCULAR; INTRAVENOUS at 09:36

## 2022-02-07 RX ADMIN — SODIUM CHLORIDE, SODIUM LACTATE, POTASSIUM CHLORIDE, CALCIUM CHLORIDE: 600; 310; 30; 20 INJECTION, SOLUTION INTRAVENOUS at 09:19

## 2022-02-07 RX ADMIN — FENTANYL CITRATE 50 MCG: 50 INJECTION, SOLUTION INTRAMUSCULAR; INTRAVENOUS at 09:25

## 2022-02-07 RX ADMIN — DEXAMETHASONE SODIUM PHOSPHATE 4 MG: 4 INJECTION, SOLUTION INTRA-ARTICULAR; INTRALESIONAL; INTRAMUSCULAR; INTRAVENOUS; SOFT TISSUE at 09:36

## 2022-02-07 RX ADMIN — GLYCOPYRROLATE 0.2 MG: 0.2 INJECTION, SOLUTION INTRAMUSCULAR; INTRAVENOUS at 09:36

## 2022-02-07 RX ADMIN — CEFAZOLIN SODIUM 2 G: 2 SOLUTION INTRAVENOUS at 09:29

## 2022-02-07 RX ADMIN — ACETAMINOPHEN 975 MG: 325 TABLET ORAL at 09:05

## 2022-02-07 RX ADMIN — KETOROLAC TROMETHAMINE 30 MG: 30 INJECTION, SOLUTION INTRAMUSCULAR; INTRAVENOUS at 09:36

## 2022-02-07 RX ADMIN — GABAPENTIN 300 MG: 300 CAPSULE ORAL at 09:05

## 2022-02-07 RX ADMIN — LIDOCAINE HYDROCHLORIDE 100 MG: 20 INJECTION, SOLUTION INFILTRATION; PERINEURAL at 09:25

## 2022-02-07 RX ADMIN — FENTANYL CITRATE 50 MCG: 50 INJECTION, SOLUTION INTRAMUSCULAR; INTRAVENOUS at 09:40

## 2022-02-07 RX ADMIN — PROPOFOL 150 MCG/KG/MIN: 10 INJECTION, EMULSION INTRAVENOUS at 09:25

## 2022-02-07 ASSESSMENT — MIFFLIN-ST. JEOR: SCORE: 1488.95

## 2022-02-07 NOTE — OP NOTE
"PNDS met, I/O per flowsheet. CMS intact, pt continues to c/o pain to R big fot, states she \"broke it this winter\" otherwise pain is tolerable. IS instruction given, pt able to demonstrate adequately, pt frequently encouraged to take deep breath RA O2 95% when encouraged. Transferred to Phase II to discharge to home.  " Gynecological Oncology Operative Note    Preoperative diagnosis:  CIN3, HSIL  Postoperative diagnosis: Same   Procedure: Cold knife cone, endometrial curettings  Surgeon: Fouzia Young MD  Assistants:   - Vinicius Pedraza MD - PGY2  Anesthesia: GETA with block  Complications: none   Specimens:    Drains: None  Specimens:       ID Type Source Tests Collected by Time Destination   1 : cervical cone, tag at 12 Tissue Cervix SURGICAL PATHOLOGY EXAM Fouzia Young MD 2/7/2022  9:48 AM     2 : endocervical curettings Tissue Endocervical/vaginal SURGICAL PATHOLOGY EXAM Fouzia Young MD 2/7/2022  9:51 AM      EBL: 20 mL  IVF: See anesthesia flow sheet  UOP: Nor measured   Findings: Normal external female genitalia. Normal vaginal mucosa. Normal parous cervix with significant descensus. Cervix with decreased Lugol's uptake along inferior lip and at right aspect of external os. Hemostatic at end of case with electrocautery and Surgicel Snow.   Indications: Patient presents for cold knife cone in setting of HSIL, CIN3 on recent cervical biopsy. Risks and benefits of procedure were explained to patient and consent was signed.  Procedure:   The patient was taken back to the OR. Patient was prepped and draped in the usual sterile fashion. Patient was placed in dorsal lithotomy position. The speculum were then placed in the vagina to visualize the cervix. The anterior lip of the cervix was grasped with the tenaculum. A paracervical block was placed by injecting 6 total mL at 12, 3, 6, and 9 o'clock on the cervical face. The uterus was sounded. Two interrupted sutures of 0 Vicryl were placed in the cervical stroma at 3 and 9 o'clock to provide traction and hemostasis. The scalpel was used to excise a conical biopsy of the cervix encircling the endocervical canal. The biopsy was removed and oriented with a single suture at 12 o'clock. Endocervical curettage was performed. Hemostasis of the biopsy site was achieved with  electrocautery and Surgicel Snow. The previously placed sutures at 3 and 9 o'clock were trimmed and left in place. Good hemostasis was seen at the end of the case. Count was correct.  The patient was repositioned to the supine position.  The patient tolerated the procedure well and was transferred to PACU in stable condition.  Dr. Young was scrubbed and present for the entire procedure.    Vinicius Pedraza MD  Obstetrics, Gynecology, and Women's Health  PGY2  8:58 AM 02/07/2022      Attending Attestation: I was present and scrubbed for the entire surgical procedure.   I have reviewed and edited above note and agree with findings as documented.    Fouzia Young MD  Gynecologic Oncology  [facility]

## 2022-02-07 NOTE — ANESTHESIA POSTPROCEDURE EVALUATION
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    Anesthesia Type:  General    Note:  Disposition: Outpatient   Postop Pain Control: Uneventful            Sign Out: Well controlled pain   PONV: No   Neuro/Psych: Uneventful            Sign Out: Acceptable/Baseline neuro status   Airway/Respiratory: Uneventful            Sign Out: Acceptable/Baseline resp. status   CV/Hemodynamics: Uneventful            Sign Out: Acceptable CV status   Other NRE: NONE   DID A NON-ROUTINE EVENT OCCUR? No           Last vitals:  Vitals Value Taken Time   /101 02/07/22 1045   Temp 36.4  C (97.5  F) 02/07/22 1045   Pulse 93 02/07/22 1054   Resp 20 02/07/22 1054   SpO2 97 % 02/07/22 1054   Vitals shown include unvalidated device data.    Electronically Signed By: Alan Ruffin DO  February 7, 2022  12:55 PM

## 2022-02-07 NOTE — DISCHARGE INSTRUCTIONS
975 MG TYLENOL given at 0900. NEXT DOSE TO BE TAKEN AT 3PM OR AFTER.       [facility]  Home Care Following Anesthesia  For 24 hours after surgery:  1. Get plenty of rest.  A responsible adult must stay with you for at least 24 hours after you leave the surgery center.  2. Do not drive or use heavy equipment.  If you have weakness or tingling, don't drive or use heavy equipment until this feeling goes away.   3. Do not drink alcohol.   4. Avoid strenuous or risky activities.  Ask for help when climbing stairs.  5. You may feel lightheaded.  IF so, sit for a few minutes before standing.  Have someone help you get up.   6. If you have nausea (feel sick to your stomach): Drink only clear liquids such as apple juice, ginger ale, broth or 7-Up.  Rest may also help.  Be sure to drink enough fluids.  Move to a regular diet as you feel able.   7. You may have a slight fever.  Call the doctor if your fever is over 100 F (37.7 C) (taken under the tongue) or lasts longer than 24 hours.  8. You may have a dry mouth, a sore throat, muscle aches or trouble sleeping. These should go away after 24 hours.  9. Do not make important or legal decisions.   10. It is recommended to avoid smoking.               Tips for taking pain medications  To get the best pain relief possible, remember these points:    Take pain medications as directed, before pain becomes severe.    Pain medication can upset your stomach: taking it with food may help.    Constipation is a common side effect of pain medication. Drink plenty of  fluids.    Eat foods high in fiber. Take a stool softener if recommended by your doctor or pharmacist.    Do not drink alcohol, drive or operate machinery while taking pain medications.    Ask about other ways to control pain, such as with heat, ice or relaxation.    Tylenol/Acetaminophen Consumption  To help encourage the safe use of acetaminophen, the makers of TYLENOL  have lowered the  maximum daily dose for single-ingredient Extra Strength TYLENOL  (acetaminophen) products sold in the [place] from 8 pills per day (4,000 mg) to 6 pills per day (3,000 mg). The dosing interval has also changed from 2 pills every 4-6 hours to 2 pills every 6 hours.    If you feel your pain relief is insufficient, you may take Tylenol/Acetaminophen in addition to your narcotic pain medication.     Be careful not to exceed 3,000 mg of Tylenol/Acetaminophen in a 24 hour period from all sources.    If you are taking extra strength Tylenol/acetaminophen (500 mg), the maximum dose is 6 tablets in 24 hours.    If you are taking regular strength acetaminophen (325 mg), the maximum dose is 9 tablets in 24 hours.    Call a doctor for any of the followin. Signs of infection (fever, growing tenderness at the surgery site, a large amount of drainage or bleeding, severe pain, foul-smelling drainage, redness, swelling).  2. It has been over 8 to 10 hours since surgery and you are still not able to urinate (pass water).  3. Headache for over 24 hours.  4. Numbness, tingling or weakness the day after surgery (if you had spinal anesthesia).  5. Signs of Covid-19 infection (temperature over 100 degrees, shortness of breath, cough, loss of taste/smell, generalized body aches, persistent headache, chills, sore throat, nausea/vomiting/diarrhea)  Your doctor is:       Dr. Fouzia Young, Gynecologic Oncology: [phone]               Or dial [phone] and ask for the resident on call for:  Gynecologic Oncology  For emergency care, call the:  [facility] Emergency Department: [phone] (TTY for hearing impaired: [phone])

## 2022-02-07 NOTE — ANESTHESIA CARE TRANSFER NOTE
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    Anesthesia Type:   General     Note:    Oropharynx: oropharynx clear of all foreign objects and spontaneously breathing  Level of Consciousness: drowsy  Oxygen Supplementation: face mask  Level of Supplemental Oxygen (L/min / FiO2): 6  Independent Airway: airway patency satisfactory and stable  Dentition: dentition unchanged  Vital Signs Stable: post-procedure vital signs reviewed and stable  Report to RN Given: handoff report given  Patient transferred to: PACU    Handoff Report: Identifed the Patient, Identified the Reponsible Provider, Reviewed the pertinent medical history, Discussed the surgical course, Reviewed Intra-OP anesthesia mangement and issues during anesthesia, Set expectations for post-procedure period and Allowed opportunity for questions and acknowledgement of understanding      Vitals:  Vitals Value Taken Time   /92 02/07/22 1015   Temp     Pulse 91 02/07/22 1017   Resp 18 02/07/22 1017   SpO2 99 % 02/07/22 1017   Vitals shown include unvalidated device data.    Electronically Signed By: XIN Trotter CRNA  February 7, 2022  10:18 AM

## 2022-02-07 NOTE — BRIEF OP NOTE
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]  Post-operative diagnosis Same as pre-operative diagnosis    Procedure: Procedure(s):  Pelvic exam under anesthesia, cold knife conization cervix, ECC  Surgeon: Surgeon(s) and Role:     * Fouzia Young MD - Primary     * Vinicius Pedraza MD - Resident - Assisting  Anesthesia: General   Estimated Blood Loss: 20 mL    Drains: None  Specimens:   ID Type Source Tests Collected by Time Destination   1 : cervical cone, tag at 12 Tissue Cervix SURGICAL PATHOLOGY EXAM Fouzia Young MD 2/7/2022  9:48 AM    2 : endocervical curettings Tissue Endocervical/vaginal SURGICAL PATHOLOGY EXAM Fouzia Young MD 2/7/2022  9:51 AM        Findings:   Normal external female genitalia. Normal vaginal mucosa. Normal parous cervix with significant descensus. Cervix with decreased Lugol's uptake along inferior lip and at right aspect of external os. Hemostatic at end of case with electrocautery and Surgicel Snow.   Complications: None.  Implants: * No implants in log *      Vinicius Pedraza MD - PGY2  Gynecologic Oncology   Gyn Onc Pager [phone]  10:12 AM 02/07/2022       Attending Attestation: I was present and scrubbed for the entire surgical procedure.   I have reviewed and edited above note and agree with findings as documented.     Fouzia Young MD  Gynecologic Oncology  [facility]

## 2022-02-08 LAB
PATH REPORT.COMMENTS IMP SPEC: NORMAL
PATH REPORT.COMMENTS IMP SPEC: NORMAL
PATH REPORT.FINAL DX SPEC: NORMAL
PATH REPORT.GROSS SPEC: NORMAL
PATH REPORT.MICROSCOPIC SPEC OTHER STN: NORMAL
PATH REPORT.RELEVANT HX SPEC: NORMAL
PHOTO IMAGE: NORMAL

## 2022-02-19 ENCOUNTER — HOSPITAL ENCOUNTER (OUTPATIENT)
Facility: CLINIC | Age: 42
Discharge: HOME | End: 2022-02-19
Attending: EMERGENCY MEDICINE | Admitting: OBSTETRICS & GYNECOLOGY
Payer: COMMERCIAL

## 2022-02-19 ENCOUNTER — ANESTHESIA (OUTPATIENT)
Dept: SURGERY | Facility: CLINIC | Age: 42
End: 2022-02-19
Payer: COMMERCIAL

## 2022-02-19 ENCOUNTER — ANESTHESIA EVENT (OUTPATIENT)
Dept: SURGERY | Facility: CLINIC | Age: 42
End: 2022-02-19
Payer: COMMERCIAL

## 2022-02-19 VITALS
HEIGHT: 63 IN | RESPIRATION RATE: 15 BRPM | OXYGEN SATURATION: 96 % | DIASTOLIC BLOOD PRESSURE: 88 MMHG | WEIGHT: 170 LBS | BODY MASS INDEX: 30.12 KG/M2 | TEMPERATURE: 98.1 F | HEART RATE: 89 BPM | SYSTOLIC BLOOD PRESSURE: 140 MMHG

## 2022-02-19 DIAGNOSIS — N99.820: ICD-10-CM

## 2022-02-19 DIAGNOSIS — N88.8 BLEEDING OF CERVIX: Primary | ICD-10-CM

## 2022-02-19 DIAGNOSIS — Z11.52 ENCOUNTER FOR SCREENING LABORATORY TESTING FOR SEVERE ACUTE RESPIRATORY SYNDROME CORONAVIRUS 2 (SARS-COV-2): ICD-10-CM

## 2022-02-19 LAB
ABO/RH(D): NORMAL
ANTIBODY SCREEN: NEGATIVE
APTT PPP: 30 SECONDS (ref 22–38)
B-HCG SERPL-ACNC: <1 IU/L (ref 0–5)
BASOPHILS # BLD AUTO: 0.1 10E3/UL (ref 0–0.2)
BASOPHILS NFR BLD AUTO: 1 %
EOSINOPHIL # BLD AUTO: 0.3 10E3/UL (ref 0–0.7)
EOSINOPHIL NFR BLD AUTO: 3 %
ERYTHROCYTE [DISTWIDTH] IN BLOOD BY AUTOMATED COUNT: 16.7 % (ref 10–15)
ERYTHROCYTE [DISTWIDTH] IN BLOOD BY AUTOMATED COUNT: 16.8 % (ref 10–15)
HCT VFR BLD AUTO: 37.8 % (ref 35–47)
HCT VFR BLD AUTO: 42.2 % (ref 35–47)
HGB BLD-MCNC: 12.2 G/DL (ref 11.7–15.7)
HGB BLD-MCNC: 13.8 G/DL (ref 11.7–15.7)
HOLD SPECIMEN: NORMAL
HOLD SPECIMEN: NORMAL
IMM GRANULOCYTES # BLD: 0 10E3/UL
IMM GRANULOCYTES NFR BLD: 0 %
INR PPP: 0.89 (ref 0.86–1.14)
LYMPHOCYTES # BLD AUTO: 5 10E3/UL (ref 0.8–5.3)
LYMPHOCYTES NFR BLD AUTO: 43 %
MCH RBC QN AUTO: 26.4 PG (ref 26.5–33)
MCH RBC QN AUTO: 26.8 PG (ref 26.5–33)
MCHC RBC AUTO-ENTMCNC: 32.3 G/DL (ref 31.5–36.5)
MCHC RBC AUTO-ENTMCNC: 32.7 G/DL (ref 31.5–36.5)
MCV RBC AUTO: 82 FL (ref 78–100)
MCV RBC AUTO: 82 FL (ref 78–100)
MONOCYTES # BLD AUTO: 1.3 10E3/UL (ref 0–1.3)
MONOCYTES NFR BLD AUTO: 11 %
NEUTROPHILS # BLD AUTO: 4.9 10E3/UL (ref 1.6–8.3)
NEUTROPHILS NFR BLD AUTO: 42 %
NRBC # BLD AUTO: 0 10E3/UL
NRBC BLD AUTO-RTO: 0 /100
PLATELET # BLD AUTO: 294 10E3/UL (ref 150–450)
PLATELET # BLD AUTO: 343 10E3/UL (ref 150–450)
RBC # BLD AUTO: 4.62 10E6/UL (ref 3.8–5.2)
RBC # BLD AUTO: 5.14 10E6/UL (ref 3.8–5.2)
SARS-COV-2 RNA RESP QL NAA+PROBE: NEGATIVE
SPECIMEN EXPIRATION DATE: NORMAL
WBC # BLD AUTO: 11.6 10E3/UL (ref 4–11)
WBC # BLD AUTO: 15.5 10E3/UL (ref 4–11)

## 2022-02-19 PROCEDURE — 57510 CAUTERIZATION OF CERVIX: CPT | Mod: GC | Performed by: OBSTETRICS & GYNECOLOGY

## 2022-02-19 PROCEDURE — 250N000013 HC RX MED GY IP 250 OP 250 PS 637: Performed by: OBSTETRICS & GYNECOLOGY

## 2022-02-19 PROCEDURE — 250N000009 HC RX 250: Performed by: NURSE ANESTHETIST, CERTIFIED REGISTERED

## 2022-02-19 PROCEDURE — 85027 COMPLETE CBC AUTOMATED: CPT | Performed by: OBSTETRICS & GYNECOLOGY

## 2022-02-19 PROCEDURE — 85730 THROMBOPLASTIN TIME PARTIAL: CPT | Performed by: EMERGENCY MEDICINE

## 2022-02-19 PROCEDURE — 36415 COLL VENOUS BLD VENIPUNCTURE: CPT | Performed by: OBSTETRICS & GYNECOLOGY

## 2022-02-19 PROCEDURE — 250N000011 HC RX IP 250 OP 636: Performed by: NURSE ANESTHETIST, CERTIFIED REGISTERED

## 2022-02-19 PROCEDURE — 258N000003 HC RX IP 258 OP 636: Performed by: NURSE ANESTHETIST, CERTIFIED REGISTERED

## 2022-02-19 PROCEDURE — 250N000013 HC RX MED GY IP 250 OP 250 PS 637: Performed by: STUDENT IN AN ORGANIZED HEALTH CARE EDUCATION/TRAINING PROGRAM

## 2022-02-19 PROCEDURE — 370N000017 HC ANESTHESIA TECHNICAL FEE, PER MIN: Performed by: OBSTETRICS & GYNECOLOGY

## 2022-02-19 PROCEDURE — C9803 HOPD COVID-19 SPEC COLLECT: HCPCS

## 2022-02-19 PROCEDURE — 99285 EMERGENCY DEPT VISIT HI MDM: CPT | Mod: 25

## 2022-02-19 PROCEDURE — 99204 OFFICE O/P NEW MOD 45 MIN: CPT | Mod: 25 | Performed by: OBSTETRICS & GYNECOLOGY

## 2022-02-19 PROCEDURE — U0003 INFECTIOUS AGENT DETECTION BY NUCLEIC ACID (DNA OR RNA); SEVERE ACUTE RESPIRATORY SYNDROME CORONAVIRUS 2 (SARS-COV-2) (CORONAVIRUS DISEASE [COVID-19]), AMPLIFIED PROBE TECHNIQUE, MAKING USE OF HIGH THROUGHPUT TECHNOLOGIES AS DESCRIBED BY CMS-2020-01-R: HCPCS | Performed by: OBSTETRICS & GYNECOLOGY

## 2022-02-19 PROCEDURE — 258N000003 HC RX IP 258 OP 636: Performed by: OBSTETRICS & GYNECOLOGY

## 2022-02-19 PROCEDURE — 710N000012 HC RECOVERY PHASE 2, PER MINUTE: Performed by: OBSTETRICS & GYNECOLOGY

## 2022-02-19 PROCEDURE — 710N000010 HC RECOVERY PHASE 1, LEVEL 2, PER MIN: Performed by: OBSTETRICS & GYNECOLOGY

## 2022-02-19 PROCEDURE — 272N000001 HC OR GENERAL SUPPLY STERILE: Performed by: OBSTETRICS & GYNECOLOGY

## 2022-02-19 PROCEDURE — 250N000011 HC RX IP 250 OP 636

## 2022-02-19 PROCEDURE — 85610 PROTHROMBIN TIME: CPT | Performed by: EMERGENCY MEDICINE

## 2022-02-19 PROCEDURE — 360N000074 HC SURGERY LEVEL 1, PER MIN: Performed by: OBSTETRICS & GYNECOLOGY

## 2022-02-19 PROCEDURE — 250N000024 HC ISOFLURANE, PER MIN: Performed by: OBSTETRICS & GYNECOLOGY

## 2022-02-19 PROCEDURE — 36415 COLL VENOUS BLD VENIPUNCTURE: CPT | Performed by: EMERGENCY MEDICINE

## 2022-02-19 PROCEDURE — 99285 EMERGENCY DEPT VISIT HI MDM: CPT | Performed by: EMERGENCY MEDICINE

## 2022-02-19 PROCEDURE — 85025 COMPLETE CBC W/AUTO DIFF WBC: CPT | Performed by: EMERGENCY MEDICINE

## 2022-02-19 PROCEDURE — 86901 BLOOD TYPING SEROLOGIC RH(D): CPT | Performed by: EMERGENCY MEDICINE

## 2022-02-19 PROCEDURE — 250N000009 HC RX 250: Performed by: OBSTETRICS & GYNECOLOGY

## 2022-02-19 PROCEDURE — 84702 CHORIONIC GONADOTROPIN TEST: CPT | Performed by: OBSTETRICS & GYNECOLOGY

## 2022-02-19 RX ORDER — ACETAMINOPHEN 325 MG/1
975 TABLET ORAL ONCE
Status: DISCONTINUED | OUTPATIENT
Start: 2022-02-19 | End: 2022-02-19 | Stop reason: HOSPADM

## 2022-02-19 RX ORDER — FENTANYL CITRATE 50 UG/ML
50-100 INJECTION, SOLUTION INTRAMUSCULAR; INTRAVENOUS ONCE
Status: COMPLETED | OUTPATIENT
Start: 2022-02-19 | End: 2022-02-19

## 2022-02-19 RX ORDER — HYDROMORPHONE HCL IN WATER/PF 6 MG/30 ML
0.2 PATIENT CONTROLLED ANALGESIA SYRINGE INTRAVENOUS EVERY 5 MIN PRN
Status: DISCONTINUED | OUTPATIENT
Start: 2022-02-19 | End: 2022-02-19 | Stop reason: HOSPADM

## 2022-02-19 RX ORDER — LIDOCAINE HYDROCHLORIDE 20 MG/ML
INJECTION, SOLUTION INFILTRATION; PERINEURAL PRN
Status: DISCONTINUED | OUTPATIENT
Start: 2022-02-19 | End: 2022-02-19

## 2022-02-19 RX ORDER — OXYCODONE HYDROCHLORIDE 5 MG/1
5 TABLET ORAL
Status: DISCONTINUED | OUTPATIENT
Start: 2022-02-19 | End: 2022-02-19 | Stop reason: HOSPADM

## 2022-02-19 RX ORDER — ONDANSETRON 4 MG/1
4 TABLET, ORALLY DISINTEGRATING ORAL EVERY 30 MIN PRN
Status: DISCONTINUED | OUTPATIENT
Start: 2022-02-19 | End: 2022-02-19 | Stop reason: HOSPADM

## 2022-02-19 RX ORDER — FENTANYL CITRATE 50 UG/ML
INJECTION, SOLUTION INTRAMUSCULAR; INTRAVENOUS PRN
Status: DISCONTINUED | OUTPATIENT
Start: 2022-02-19 | End: 2022-02-19

## 2022-02-19 RX ORDER — FENTANYL CITRATE 50 UG/ML
INJECTION, SOLUTION INTRAMUSCULAR; INTRAVENOUS
Status: COMPLETED
Start: 2022-02-19 | End: 2022-02-19

## 2022-02-19 RX ORDER — IBUPROFEN 800 MG/1
800 TABLET, FILM COATED ORAL ONCE
Status: DISCONTINUED | OUTPATIENT
Start: 2022-02-19 | End: 2022-02-19 | Stop reason: HOSPADM

## 2022-02-19 RX ORDER — DEXAMETHASONE SODIUM PHOSPHATE 4 MG/ML
INJECTION, SOLUTION INTRA-ARTICULAR; INTRALESIONAL; INTRAMUSCULAR; INTRAVENOUS; SOFT TISSUE PRN
Status: DISCONTINUED | OUTPATIENT
Start: 2022-02-19 | End: 2022-02-19

## 2022-02-19 RX ORDER — OXYCODONE HYDROCHLORIDE 5 MG/1
5 TABLET ORAL EVERY 4 HOURS PRN
Status: DISCONTINUED | OUTPATIENT
Start: 2022-02-19 | End: 2022-02-19 | Stop reason: HOSPADM

## 2022-02-19 RX ORDER — FENTANYL CITRATE 50 UG/ML
25 INJECTION, SOLUTION INTRAMUSCULAR; INTRAVENOUS EVERY 5 MIN PRN
Status: DISCONTINUED | OUTPATIENT
Start: 2022-02-19 | End: 2022-02-19 | Stop reason: HOSPADM

## 2022-02-19 RX ORDER — SODIUM CHLORIDE, SODIUM LACTATE, POTASSIUM CHLORIDE, CALCIUM CHLORIDE 600; 310; 30; 20 MG/100ML; MG/100ML; MG/100ML; MG/100ML
INJECTION, SOLUTION INTRAVENOUS CONTINUOUS
Status: DISCONTINUED | OUTPATIENT
Start: 2022-02-19 | End: 2022-02-19 | Stop reason: HOSPADM

## 2022-02-19 RX ORDER — SODIUM CHLORIDE, SODIUM LACTATE, POTASSIUM CHLORIDE, CALCIUM CHLORIDE 600; 310; 30; 20 MG/100ML; MG/100ML; MG/100ML; MG/100ML
INJECTION, SOLUTION INTRAVENOUS CONTINUOUS PRN
Status: DISCONTINUED | OUTPATIENT
Start: 2022-02-19 | End: 2022-02-19

## 2022-02-19 RX ORDER — TRANEXAMIC ACID 10 MG/ML
1 INJECTION, SOLUTION INTRAVENOUS ONCE
Status: COMPLETED | OUTPATIENT
Start: 2022-02-19 | End: 2022-02-19

## 2022-02-19 RX ORDER — ONDANSETRON 2 MG/ML
4 INJECTION INTRAMUSCULAR; INTRAVENOUS EVERY 30 MIN PRN
Status: DISCONTINUED | OUTPATIENT
Start: 2022-02-19 | End: 2022-02-19 | Stop reason: HOSPADM

## 2022-02-19 RX ORDER — PROPOFOL 10 MG/ML
INJECTION, EMULSION INTRAVENOUS PRN
Status: DISCONTINUED | OUTPATIENT
Start: 2022-02-19 | End: 2022-02-19

## 2022-02-19 RX ORDER — ONDANSETRON 2 MG/ML
INJECTION INTRAMUSCULAR; INTRAVENOUS PRN
Status: DISCONTINUED | OUTPATIENT
Start: 2022-02-19 | End: 2022-02-19

## 2022-02-19 RX ADMIN — SODIUM CHLORIDE, SODIUM LACTATE, POTASSIUM CHLORIDE, CALCIUM CHLORIDE: 600; 310; 30; 20 INJECTION, SOLUTION INTRAVENOUS at 05:50

## 2022-02-19 RX ADMIN — FENTANYL CITRATE 50 MCG: 50 INJECTION INTRAMUSCULAR; INTRAVENOUS at 04:12

## 2022-02-19 RX ADMIN — DOXYCYCLINE 200 MG: 100 INJECTION, POWDER, LYOPHILIZED, FOR SOLUTION INTRAVENOUS at 05:47

## 2022-02-19 RX ADMIN — Medication 100 MG: at 05:41

## 2022-02-19 RX ADMIN — LIDOCAINE HYDROCHLORIDE 100 MG: 20 INJECTION, SOLUTION INFILTRATION; PERINEURAL at 05:39

## 2022-02-19 RX ADMIN — ONDANSETRON 4 MG: 2 INJECTION INTRAMUSCULAR; INTRAVENOUS at 06:13

## 2022-02-19 RX ADMIN — FENTANYL CITRATE 50 MCG: 50 INJECTION, SOLUTION INTRAMUSCULAR; INTRAVENOUS at 04:12

## 2022-02-19 RX ADMIN — MIDAZOLAM 2 MG: 1 INJECTION INTRAMUSCULAR; INTRAVENOUS at 05:36

## 2022-02-19 RX ADMIN — SODIUM CHLORIDE, POTASSIUM CHLORIDE, SODIUM LACTATE AND CALCIUM CHLORIDE: 600; 310; 30; 20 INJECTION, SOLUTION INTRAVENOUS at 05:33

## 2022-02-19 RX ADMIN — PROPOFOL 150 MG: 10 INJECTION, EMULSION INTRAVENOUS at 05:39

## 2022-02-19 RX ADMIN — DEXAMETHASONE SODIUM PHOSPHATE 6 MG: 4 INJECTION, SOLUTION INTRAMUSCULAR; INTRAVENOUS at 06:00

## 2022-02-19 RX ADMIN — TRANEXAMIC ACID 1 G: 10 INJECTION, SOLUTION INTRAVENOUS at 05:57

## 2022-02-19 RX ADMIN — FENTANYL CITRATE 100 MCG: 50 INJECTION, SOLUTION INTRAMUSCULAR; INTRAVENOUS at 05:36

## 2022-02-19 RX ADMIN — FERRIC SUBSULFATE: 259 SOLUTION TOPICAL at 04:18

## 2022-02-19 ASSESSMENT — ENCOUNTER SYMPTOMS
SHORTNESS OF BREATH: 0
DIFFICULTY URINATING: 0
WEAKNESS: 0
HEADACHES: 0
SEIZURES: 0
DYSURIA: 0
CONFUSION: 0
FREQUENCY: 0
ABDOMINAL DISTENTION: 0
EYE PAIN: 0
VOMITING: 0
COLOR CHANGE: 0
ARTHRALGIAS: 0
SORE THROAT: 0
BACK PAIN: 0
NECK PAIN: 0
CHILLS: 0
MYALGIAS: 0
ABDOMINAL PAIN: 0
PALPITATIONS: 0
DIARRHEA: 0
DIZZINESS: 0
NAUSEA: 0
COUGH: 0
CHEST TIGHTNESS: 0
FATIGUE: 0
CONSTIPATION: 0
FEVER: 0

## 2022-02-19 ASSESSMENT — LIFESTYLE VARIABLES: TOBACCO_USE: 1

## 2022-02-19 NOTE — ED PROVIDER NOTES
ED Provider Note  [facility]      History     Chief Complaint   Patient presents with     Vaginal Bleeding     HPI  Elizabeth Gil is a 42 year old female who recently underwent CKC on 2/7/2022 for PASTORA-3, presents today with vaginal bleeding.  She notes that she is having generalized spotting since the procedure.  Today, however, she had a large amount of heavy bleeding including clots.  She states is significantly more than usual menstrual cycle.  She went through 4-5 pads in the past several hours prior to arrival.  She is already soaked through one pad while waiting in the ED for 20 minutes.  She has some pain in the suprapubic area which she states feels like menstrual cramps.  Her menstrual cycle is not due for 2 weeks.  She denies any lightheadedness/dizziness, shortness of breath, headache, vision change, paresthesias, and has no other medical complaints.  She states that she does not accept any blood products for Religion reasons.    Past Medical History  Past Medical History:   Diagnosis Date     Abnormal Pap smear of cervix 10/18/2018    10/18/18: See problem list.      Cervical high risk HPV (human papillomavirus) test positive 10/18/2018, 6/12/19    See problem list     Complex cyst of left ovary      Female stress incontinence      Hypertension      Migraine      Past Surgical History:   Procedure Laterality Date     CONIZATION N/A 2/7/2022    Procedure: Pelvic exam under anesthesia, cold knife conization cervix, ECC;  Surgeon: Fouzia Young MD;  Location: [facility] OR     LAPAROSCOPIC SALPINGO-OOPHORECTOMY N/A 12/7/2021    Procedure: Laparoscopic left salpingo-oophorectomy, Pelvic Washing, Cervical Biopsy, lysis of adhesions <20 mins;  Surgeon: Fouzia Young MD;  Location: [facility] OR     PANNICULECTOMY  2012     acetaminophen (TYLENOL) 325 MG tablet  acetaminophen (TYLENOL) 325 MG tablet  Emollient (AQUAPHOR ADVANCED THERAPY) OINT  hydrochlorothiazide (HYDRODIURIL) 25 MG  tablet  ibuprofen (ADVIL/MOTRIN) 600 MG tablet  ibuprofen (ADVIL/MOTRIN) 800 MG tablet  Multiple Vitamin (MULTIVITAMIN ADULT) TABS  oxyCODONE (ROXICODONE) 5 MG tablet  phentermine (ADIPEX-P) 37.5 MG tablet  senna-docusate (SENOKOT-S/PERICOLACE) 8.6-50 MG tablet  topiramate (TOPAMAX) 100 MG tablet  topiramate (TOPAMAX) 50 MG tablet  UNABLE TO FIND  vitamin D3 (CHOLECALCIFEROL) 2000 units (50 mcg) tablet      Allergies   Allergen Reactions     Morphine Itching     Family History  Family History   Problem Relation Age of Onset     Thyroid Disease Mother      Heart Disease Mother      Hypertension Mother      Obesity Mother      Colon Cancer Father      Aneurysm Father      Hypertension Father      Obesity Father      Ovarian Cancer Maternal Aunt      Breast Cancer No family hx of      Uterine Cancer No family hx of      Anesthesia Reaction No family hx of      Deep Vein Thrombosis (DVT) No family hx of      Social History   Social History     Tobacco Use     Smoking status: Light Tobacco Smoker     Packs/day: 1.00     Years: 15.00     Pack years: 15.00     Types: Cigarettes     Smokeless tobacco: Never Used     Tobacco comment: smokes 1/2 ppd (2/2/22)   Substance Use Topics     Alcohol use: Yes     Comment: 2-3 week     Drug use: No      Past medical history, past surgical history, medications, allergies, family history, and social history were reviewed with the patient. No additional pertinent items.       Review of Systems   Constitutional: Negative for chills, fatigue and fever.   HENT: Negative for congestion and sore throat.    Eyes: Negative for pain and visual disturbance.   Respiratory: Negative for cough, chest tightness and shortness of breath.    Cardiovascular: Negative for chest pain and palpitations.   Gastrointestinal: Negative for abdominal distention, abdominal pain, constipation, diarrhea, nausea and vomiting.        Suprapubic cramping   Genitourinary: Positive for vaginal bleeding. Negative for  "difficulty urinating, dysuria, frequency, urgency, vaginal discharge and vaginal pain.   Musculoskeletal: Negative for arthralgias, back pain, myalgias and neck pain.   Skin: Negative for color change and rash.   Neurological: Negative for dizziness, weakness and headaches.   Psychiatric/Behavioral: Negative for confusion.     A complete review of systems was performed with pertinent positives and negatives noted in the HPI, and all other systems negative.    Physical Exam   BP: (!) 164/103  Pulse: 98  Temp: 98  F (36.7  C)  Resp: 16  Height: 158.8 cm (5' 2.5\")  Weight: 77.1 kg (170 lb)  SpO2: 100 %  Physical Exam  Vitals and nursing note reviewed. Exam conducted with a chaperone present.   Constitutional:       General: She is not in acute distress.     Appearance: Normal appearance. She is not ill-appearing or toxic-appearing.   HENT:      Head: Normocephalic and atraumatic.      Nose: Nose normal.      Mouth/Throat:      Mouth: Mucous membranes are moist.   Eyes:      Pupils: Pupils are equal, round, and reactive to light.   Cardiovascular:      Rate and Rhythm: Normal rate.      Pulses: Normal pulses.      Heart sounds: Normal heart sounds.   Pulmonary:      Effort: Pulmonary effort is normal. No respiratory distress.      Breath sounds: Normal breath sounds.   Abdominal:      General: Abdomen is flat. There is no distension.   Genitourinary:     Exam position: Lithotomy position.      Vagina: Bleeding present.      Cervix: Cervical bleeding present.         Musculoskeletal:         General: No swelling or deformity. Normal range of motion.      Cervical back: Normal range of motion. No rigidity.   Skin:     General: Skin is warm.      Capillary Refill: Capillary refill takes less than 2 seconds.   Neurological:      Mental Status: She is alert and oriented to person, place, and time.   Psychiatric:         Mood and Affect: Mood normal.         ED Course      Procedures       Critical Care Addendum    My initial " assessment, based on my review of nursing observations, review of vital signs, focused history and physical exam, established that Elizabeth Gil has severe hemorrhage, which requires immediate intervention, and therefore she is critically ill.     After the initial assessment, the care team initiated multiple lab tests and consulted with OB/GYN to provide stabilization care. Due to the critical nature of this patient, I reassessed nursing observations, vital signs and physical exam multiple times prior to her disposition.     Time also spent performing documentation, reviewing test results, discussion with consultants and coordination of care.     Critical care time (excluding teaching time and procedures): 35 minutes.       The medical record was reviewed and interpreted.  Current labs reviewed and interpreted.  Previous labs reviewed and interpreted.              Results for orders placed or performed during the hospital encounter of 02/19/22   INR     Status: Normal   Result Value Ref Range    INR 0.89 0.86 - 1.14   Partial thromboplastin time     Status: Normal   Result Value Ref Range    aPTT 30 22 - 38 Seconds   CBC with platelets and differential     Status: Abnormal   Result Value Ref Range    WBC Count 11.6 (H) 4.0 - 11.0 10e3/uL    RBC Count 5.14 3.80 - 5.20 10e6/uL    Hemoglobin 13.8 11.7 - 15.7 g/dL    Hematocrit 42.2 35.0 - 47.0 %    MCV 82 78 - 100 fL    MCH 26.8 26.5 - 33.0 pg    MCHC 32.7 31.5 - 36.5 g/dL    RDW 16.8 (H) 10.0 - 15.0 %    Platelet Count 343 150 - 450 10e3/uL    % Neutrophils 42 %    % Lymphocytes 43 %    % Monocytes 11 %    % Eosinophils 3 %    % Basophils 1 %    % Immature Granulocytes 0 %    NRBCs per 100 WBC 0 <1 /100    Absolute Neutrophils 4.9 1.6 - 8.3 10e3/uL    Absolute Lymphocytes 5.0 0.8 - 5.3 10e3/uL    Absolute Monocytes 1.3 0.0 - 1.3 10e3/uL    Absolute Eosinophils 0.3 0.0 - 0.7 10e3/uL    Absolute Basophils 0.1 0.0 - 0.2 10e3/uL    Absolute Immature Granulocytes 0.0  <=0.4 10e3/uL    Absolute NRBCs 0.0 10e3/uL   CBC with platelets differential     Status: Abnormal    Narrative    The following orders were created for panel order CBC with platelets differential.  Procedure                               Abnormality         Status                     ---------                               -----------         ------                     CBC with platelets and d...[[id number]]  Abnormal            Final result                 Please view results for these tests on the individual orders.   ABO/Rh type and screen *Canceled*     Status: None ()    Narrative    The following orders were created for panel order ABO/Rh type and screen.  Procedure                               Abnormality         Status                     ---------                               -----------         ------                       Please view results for these tests on the individual orders.   ABO/Rh type and screen *Canceled*     Status: None ()    Narrative    The following orders were created for panel order ABO/Rh type and screen.  Procedure                               Abnormality         Status                     ---------                               -----------         ------                     Adult Type and Screen[[id number]]                                                         Please view results for these tests on the individual orders.     Medications   ferric subsulfate (ASTRINGYN) solution ( Topical Given by Other Clinician 2/19/22 0418)   acetaminophen (TYLENOL) tablet 975 mg (975 mg Oral Not Given 2/19/22 0448)   Provider ordered ALTERNATE pre op antibiotic. (has no administration in time range)   tranexamic acid 1 g in 100 mL NS IV bag (premix) (has no administration in time range)   doxycycline (VIBRAMYCIN) 200 mg in sodium chloride 0.9 % 100 mL intermittent infusion (has no administration in time range)   fentaNYL (PF) (SUBLIMAZE) injection  mcg (50 mcg Intravenous Given 2/19/22  0412)        Assessments & Plan (with Medical Decision Making)   Patient with recent cold knife cone for PASTORA-3 presents to the ED with vaginal bleeding.    Differential diagnosis includes blood loss anemia, surgical site bleeding, menstrual cycle, coagulopathy    On arrival, patient has normal vital signs.  Her blood pressure is 143/98.  She is nontachycardic.  She does have some mild tenderness in the suprapubic area.  On pelvic exam, there is a large amount of dark red bleeding with clots.  Unable to visualize source on my exam.  OB consulted immediately who evaluated the patient in the ED.  Initially attempted packing but unable to achieve hemostasis.  Plan for OR.    Patient hemoglobin is 13.8, however, I would expect this to be lower on repeat as majority of patients bleeding has occurred in the past several hours and may not be reflected in the hemoglobin.  PTT and INR within normal limits.    I have reviewed the nursing notes. I have reviewed the findings, diagnosis, plan and need for follow up with the patient.    New Prescriptions    No medications on file       Final diagnoses:   Bleeding of cervix       --  Davidson Nolan DO  [facility] EMERGENCY DEPARTMENT  2/19/2022     Davidson Nolan DO  02/19/22 0582

## 2022-02-19 NOTE — ANESTHESIA PREPROCEDURE EVALUATION
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      Cervical high risk HPV (human papillomavirus) test positive 10/18/2018, 19    See problem list     Complex cyst of left ovary      Female stress incontinence      Hypertension      Migraine       Past Surgical History:   Procedure Laterality Date     CONIZATION N/A 2022    Procedure: Pelvic exam under anesthesia, cold knife conization cervix, ECC;  Surgeon: Fouzia Young MD;  Location: [facility] OR     LAPAROSCOPIC SALPINGO-OOPHORECTOMY N/A 2021    Procedure: Laparoscopic left salpingo-oophorectomy, Pelvic Washing, Cervical Biopsy, lysis of adhesions <20 mins;  Surgeon: Fouzia Young MD;  Location: [facility] OR     PANNICULECTOMY        Allergies   Allergen Reactions     Morphine Itching      Social History     Tobacco Use     Smoking status: Light Tobacco Smoker     Packs/day: 1.00     Years: 15.00     Pack years: 15.00     Types: Cigarettes     Smokeless tobacco: Never Used     Tobacco comment: smokes 1/2 ppd (22)   Substance Use Topics     Alcohol use: Yes     Comment: 2-3 week      Wt Readings from Last 1 Encounters:   22 77.1 kg (170 lb)      Elizabeth Gil is a 42 year old who presents to the ED with vaginal hemorrhage s/p CKC on  for PASTORA 3. Approximately 200 ml of clot removed from vagina with ongoing active bleeding. Patient does not accept any blood products. Recommend taking patient to OR for EUA to better control bleeding given visualization limited, pain control, and refusal of blood products. Patient currently stable with hemoglobin of 13.8. Last ate at  2200.    Anesthesia Evaluation   Pt has had prior anesthetic. Type: General.    No history of anesthetic complications       ROS/MED HX  ENT/Pulmonary: Comment: 15 pk yr hx, smokes 1/2 ppd    (+) KARY risk factors, snores loudly, observed stopped breathing, tobacco use, Current use, 15  Pack-Year Hx,   (-) asthma   Neurologic: Comment: Takes topiramate for migraines    (+) migraines,  (-) no seizures   Cardiovascular: Comment: BP in clinic in Sept/Oct/Nov 2021 ranged between 153-165/. 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   Hematologic:  - neg hematologic  ROS  (-) history of blood clots and history of blood transfusion   Musculoskeletal:  - neg musculoskeletal ROS     GI/Hepatic:     (+) GERD,  (-) liver disease   Renal/Genitourinary:  - neg Renal ROS  (-) renal disease   Endo: Comment: Last dose phentermine 1/31/22      (+) Obesity,  (-) Type II DM   Psychiatric/Substance Use:  - neg psychiatric ROS     Infectious Disease:  - neg infectious disease ROS     Malignancy: Comment: Cervical cancer      Other:  - neg other ROS          Physical Exam    Airway        Mallampati: II   TM distance: > 3 FB   Neck ROM: full   Mouth opening: > 3 cm    Respiratory Devices and Support         Dental  no notable dental history         Cardiovascular   cardiovascular exam normal          Pulmonary   pulmonary exam normal                OUTSIDE LABS:  CBC:   Lab Results   Component Value Date    WBC 11.6 (H) 02/19/2022    WBC 8.4 02/04/2022    HGB 13.8 02/19/2022    HGB 13.7 02/04/2022    HCT 42.2 02/19/2022    HCT 41.0 02/04/2022     02/19/2022     02/04/2022     BMP:   Lab Results    Component Value Date     02/04/2022     11/12/2021    POTASSIUM 4.0 02/04/2022    POTASSIUM 4.0 11/12/2021    CHLORIDE 105 02/04/2022    CHLORIDE 106 11/12/2021    CO2 27 02/04/2022    CO2 28 11/12/2021    BUN 7 02/04/2022    BUN 12 11/12/2021    CR 0.62 02/04/2022    CR 0.58 11/12/2021    GLC 93 02/04/2022    GLC 91 12/07/2021     COAGS:   Lab Results   Component Value Date    PTT 30 02/19/2022    INR 0.89 02/19/2022     POC:   Lab Results   Component Value Date    HCG Negative 02/07/2022    HCGS Negative 10/16/2021     HEPATIC:   Lab Results   Component Value Date    ALBUMIN 3.7 02/04/2022    PROTTOTAL 7.5 02/04/2022    ALT 28 02/04/2022    AST 23 02/04/2022    ALKPHOS 73 02/04/2022    BILITOTAL 0.2 02/04/2022     OTHER:   Lab Results   Component Value Date    A1C 5.6 11/05/2020    MANDEEP 8.8 02/04/2022    LIPASE 84 10/16/2021    TSH 1.81 11/05/2020       Anesthesia Plan    ASA Status:  2, emergent    NPO Status:  NPO Appropriate    Anesthesia Type: General.     - Airway: ETT   Induction: RSI.   Maintenance: Balanced.   Techniques and Equipment:     - Lines/Monitors: 2nd IV     Consents    Anesthesia Plan(s) and associated risks, benefits, and realistic alternatives discussed. Questions answered and patient/representative(s) expressed understanding.     - Discussed: Risks, Benefits and Alternatives for BOTH SEDATION and the PROCEDURE were discussed     - Discussed with:  Patient      - Extended Intubation/Ventilatory Support Discussed: No.      - Patient is DNR/DNI Status: No    Use of blood products discussed: Yes.     - Discussed with: Patient.     - Consented: consented to blood products            Reason for refusal: other.     Postoperative Care    Pain management: IV analgesics, Oral pain medications, Multi-modal analgesia.   PONV prophylaxis: Ondansetron (or other 5HT-3), Dexamethasone or Solumedrol, Background Propofol Infusion     Comments:    Other Comments: Pt agreed to receive blood products  in case of emergency. Consent is signed        H&P reviewed: Unable to attach VIRTUAL H&P to encounter due to EHR limitations. Appropriate H&P reviewed. The physical exam performed by anesthesia during this surgical encounter serves as the physical portion of that virtual H&P.  Any significant changes noted within this preop evaluation.              Hyacinth Singleton MD

## 2022-02-19 NOTE — DISCHARGE INSTRUCTIONS
Pelvic rest for 2 weeks - nothing in the vagina. NO tampons, NO douching, NO intercourse, NO sex toys.  It is normal to have some black or brown chunky discharge (like coffee grounds) and light bleeding or spotting.  Follow up with Dr. Young as scheduled.    Call Dr. Young or Dr. Temple if heavy bleeding (soaking a pad in less than an hour or passing large clots), fever, worsening pain or any other concerns.     Екатерина Temple MD   [facility] OB/GYN  Professional Building  [address] [place] [address]. [address]  [place] [address]  [phone]    Same-Day Surgery   Adult Discharge Orders & Instructions     For 24 hours after surgery:  1. Get plenty of rest.  A responsible adult must stay with you for at least 24 hours after you leave the hospital.   2. Pain medication can slow your reflexes. Do not drive or use heavy equipment.  If you have weakness or tingling, don't drive or use heavy equipment until this feeling goes away.  3. Mixing alcohol and pain medication can cause dizziness and slow your breathing. It can even be fatal. Do not drink alcohol while taking pain medication.  4. Avoid strenuous or risky activities.  Ask for help when climbing stairs.   5. You may feel lightheaded.  If so, sit for a few minutes before standing.  Have someone help you get up.   6. If you have nausea (feel sick to your stomach), drink only clear liquids such as apple juice, ginger ale, broth or 7-Up.  Rest may also help.  Be sure to drink enough fluids.  Move to a regular diet as you feel able. Take pain medications with a small amount of solid food, such as toast or crackers, to avoid nausea.   7. A slight fever is normal. Call the doctor if your fever is over 100 F (37.7 C) (taken under the tongue) or lasts longer than 24 hours.  8. You may have a dry mouth, muscle aches, trouble sleeping or a sore throat.  These symptoms should go away after 24 hours.  9. Do not make important or legal decisions.   Pain  Management:      1. Take pain medication (if prescribed) for pain as directed by your physician.        2. WARNING: If the pain medication you have been prescribed contains Tylenol  (acetaminophen), DO NOT take additional doses of Tylenol (acetaminophen).     Call your doctor for any of the followin.  Signs of infection (fever, growing tenderness at the surgery site, severe pain, a large amount of drainage or bleeding, foul-smelling drainage, redness, swelling).    2.  It has been over 8 to 10 hours since surgery and you are still not able to urinate (pee).    3.  Headache for over 24 hours.    4.  Numbness, tingling or weakness the day after surgery (if you had spinal anesthesia).  To contact a doctor, call _____________________________________ or:      [phone] and ask for the Resident On Call for:          OB/GYN  (answered 24 hours a day)      Emergency Department:  [place] [facility]: [phone]  [place] [facility]: [phone]               Rev. 10/2014

## 2022-02-19 NOTE — ED TRIAGE NOTES
"Patient presents to ED via private car for vaginal bleeding. States she had a cervical cyst removed on 2/7/2022 and has experienced \"spotting\" since, however tonight began to bleed heavily. Reports soaking approximately 2 pads per hour, with several golf ball sized clots. Also reports uterine cramping and lower back pain. Denies light headedness or dizziness, shortness of breath, or chest pain. Criss Ruby RN on 2/19/2022 at 3:17 AM    "

## 2022-02-19 NOTE — OP NOTE
[facility]   Operative Note    Patient: Elizabeth Gil  : 1980  MRN: [id number]    Date of Service: 2022    Pre-operative diagnosis:  Vaginal hemorrhage status post cold knife cone    Post-operative diagnosis:  Same as above    Procedure:   Exam Under Anesthesia  Cauterization of cervix    Surgeon: Екатерина Temple MD  Assistants: Torrey Barnes MD MPH, PGY-3    Anesthesia: GETA  EBL: 50mL  (EBL in ED 300mL)  Urine Output: Drained in OR prior to start, 300mL  Fluids: 800mL crystalloid    Specimens: None  Complications: None apparent  Findings:  Vaginal vault filled with clot. Organized clot on cone bed. Foul odor from evacuated clot. Several areas of light bleeding on LEEP bed, cauterized.    Indications:   Elizabeth Gil is a 42 year old who presents to the ED with vaginal hemorrhage s/p CKC on  for PASTORA 3. EUA with cautery of cervix recommended given persistent bleeding in ED, difficult visualization in ED, and patient refusal of blood products. Discussed risks, benefits, and alternatives to the procedure including risk of infection, bleeding, recurrence. The patient's questions were answered, understanding confirmed, and the patient signed written informed consent. Prior to procedure patient did consent to receiving blood products if life threatening emergency.    Technique:   The patient was taken to the OR where she was placed in the dorsal lithotomy position with feet in Severino stirrups. General anesthesia was administered. The patient was prepped and draped in the usual sterile fashion. The bladder was drained. The single roll of kerlex that was placed in the ED was removed. The kerlex was fully saturated with blood. A speculum was placed in the vagina. Approximately 50 ml of foul smell dark clot was removed. The LEEP bed was copiously irrigated with sterile water. Several areas of light bleeding were noted on the LEEP bed. The LEEP bed was cauterized using  Bovie electrocautery with ball tip until no further bleeding was noted and the bed was charred. Chemical cautery agent Monsel's was then used to paint the area. Excellent hemostasis noted. The speculum was removed.     Dr. Temple was present and scrubbed for the procedure. The patient tolerated the procedure well, was extubated in the OR, and transferred to the PACU in stable condition.    Torrey Barnes MD MPH  OB/Gyn PGY-3  02/19/22 6:29 AM      ATTESTATION:   I was scrubbed and present for the entire procedure. I agree with the above note which I have edited to reflect my findings.   Екатерина Temple MD

## 2022-02-19 NOTE — ED NOTES
Patient report given to anesthesia. Patient being transported to OR at this time with anesthesia accompanying. Criss Ruby RN on 2/19/2022 at 5:28 AM

## 2022-02-19 NOTE — ANESTHESIA POSTPROCEDURE EVALUATION
Patient: Elizabeth Gil    Procedure: Procedure(s):  EXAM UNDER ANESTHESIA, PELVIS, cervical cauterization       Diagnosis:Bleeding [R58]  Diagnosis Additional Information: No value filed.    Anesthesia Type:  General    Note:  Disposition: Outpatient   Postop Pain Control: Uneventful            Sign Out: Well controlled pain   PONV: No   Neuro/Psych: Uneventful            Sign Out: Acceptable/Baseline neuro status   Airway/Respiratory: Uneventful            Sign Out: Acceptable/Baseline resp. status   CV/Hemodynamics: Uneventful            Sign Out: Acceptable CV status; No obvious hypovolemia; No obvious fluid overload   Other NRE: NONE   DID A NON-ROUTINE EVENT OCCUR? No           Last vitals:  Vitals Value Taken Time   /95 02/19/22 0816   Temp 36.3  C (97.3  F) 02/19/22 0715   Pulse 91 02/19/22 0819   Resp 12 02/19/22 0819   SpO2 98 % 02/19/22 0827   Vitals shown include unvalidated device data.    Electronically Signed By: Pam Carney MD  February 19, 2022  9:29 AM

## 2022-02-19 NOTE — ANESTHESIA PROCEDURE NOTES
Airway       Patient location during procedure: OR       Procedure Start/Stop Times: 2/19/2022 5:42 AM  Staff -        CRNA: Dione Tang APRN CRNA       Performed By: CRNA  Consent for Airway        Urgency: emergent  Indications and Patient Condition       Indications for airway management: charity-procedural       Induction type:RSI       Mask difficulty assessment: 0 - not attempted    Final Airway Details       Final airway type: endotracheal airway       Successful airway: ETT - single  Endotracheal Airway Details        ETT size (mm): 7.0       Cuffed: yes       Successful intubation technique: video laryngoscopy       VL Blade Size: MAC 4 (no MAC 3 blades available)       Grade View of Cords: 1       Adjucts: stylet       Position: Right       Measured from: lips       Secured at (cm): 21       Bite block used: None    Post intubation assessment        Placement verified by: capnometry, equal breath sounds and chest rise        Number of attempts at approach: 1       Number of other approaches attempted: 0       Secured with: silk tape       Ease of procedure: easy       Dentition: Intact and Unchanged

## 2022-02-19 NOTE — ANESTHESIA CARE TRANSFER NOTE
Patient: Elizabeth Gil    Procedure: Procedure(s):  EXAM UNDER ANESTHESIA, PELVIS, cervical cauterization       Diagnosis: Bleeding [R58]  Diagnosis Additional Information: No value filed.    Anesthesia Type:   General     Note:    Oropharynx: oropharynx clear of all foreign objects  Level of Consciousness: drowsy  Oxygen Supplementation: face mask    Independent Airway: airway patency satisfactory and stable  Dentition: dentition unchanged  Vital Signs Stable: post-procedure vital signs reviewed and stable  Report to RN Given: handoff report given  Patient transferred to: PACU    Handoff Report: Identifed the Patient, Identified the Reponsible Provider, Reviewed the pertinent medical history, Discussed the surgical course, Reviewed Intra-OP anesthesia mangement and issues during anesthesia, Set expectations for post-procedure period and Allowed opportunity for questions and acknowledgement of understanding      Vitals:  Vitals Value Taken Time   /85 02/19/22 0632   Temp     Pulse 100 02/19/22 0640   Resp 22 02/19/22 0640   SpO2 100 % 02/19/22 0640   Vitals shown include unvalidated device data.    Electronically Signed By: Hyacinth Singleton MD  February 19, 2022  6:42 AM

## 2022-02-19 NOTE — PROGRESS NOTES
GYN Post op check:    S: Patient reports she had minimal brownish discharge when she got up to go to the bathroom.  Reports her pain is controlled.  She was able to void urine without issues.  Took a few sips of water without nausea or vomiting.  Is planning to try eating some crackers.    O:   Patient Vitals for the past 2 hrs:   BP Temp Temp src Pulse Resp SpO2   02/19/22 0930 (!) 140/88 98.1  F (36.7  C) Axillary 89 15 96 %   02/19/22 0915 (!) 152/99 -- -- -- -- 93 %   02/19/22 0900 -- -- -- -- -- 94 %   02/19/22 0845 -- -- -- -- -- 97 %   02/19/22 0830 -- -- -- -- -- 97 %   02/19/22 0815 (!) 158/95 -- -- 85 17 99 %   02/19/22 0800 (!) 142/95 -- -- 84 16 100 %   02/19/22 0745 (!) 173/103 -- -- 92 17 100 %   ]  General- awake, answering questions appropriately, appears tired  Results for PAUL SMITH (MRN [id number]) as of 2/19/2022 09:36   Ref. Range 2/19/2022 08:18   WBC Latest Ref Range: 4.0 - 11.0 10e3/uL 15.5 (H)   Hemoglobin Latest Ref Range: 11.7 - 15.7 g/dL 12.2   Hematocrit Latest Ref Range: 35.0 - 47.0 % 37.8   Platelet Count Latest Ref Range: 150 - 450 10e3/uL 294   RBC Count Latest Ref Range: 3.80 - 5.20 10e6/uL 4.62   MCV Latest Ref Range: 78 - 100 fL 82   MCH Latest Ref Range: 26.5 - 33.0 pg 26.4 (L)   MCHC Latest Ref Range: 31.5 - 36.5 g/dL 32.3   RDW Latest Ref Range: 10.0 - 15.0 % 16.7 (H)       A&P:Paul Smith is a 42 year old POD#0 s/p EUA and cautery of [facility] bed due to vaginal hemorrhage.      Post op state:  Vitals stable.  Post op Hgb normal.  Reviewed with patient reported surgical findings.    Reiterated pelvic rest recommendations, which patient states she understands and is in agreement with.    Has follow up visit scheduled with Dr. Young 2/22/22  Patient ok for discharge to home post op    Cassi Lopes MD

## 2022-02-19 NOTE — CONSULTS
Gynecology Consult Note    Consulting Provider: Davidson Nolan DO  Consulting Service: ED  Reason for Consult: Vaginal bleeding    HPI:   Elizabeth Gil is a 42 year old who s/p CKC on 22 with Dr. Young for PASTORA 3 who presents to the ED with vaginal bleeding. Since the procedure has had daily spotting. Three days ago she noticed a strong odor. Yesterday began to have heavy bleeding. Had to leave work. Has been bleeding through pads frequently and had large clot size of tashia. Blood is dark red. Also having abdominal cramps. Feel like menstrual cramps. She is 2 weeks early for period however. She feels tingling in hands. Also palpitations. Denies HA, dizziness.     Patient does not accept any blood products.    ROS:   Negative except as per HPI    OB History:       GYN History:   PASTORA 3 s/p CKC     PMH:   Past Medical History:   Diagnosis Date     Abnormal Pap smear of cervix 10/18/2018    10/18/18: See problem list.      Cervical high risk HPV (human papillomavirus) test positive 10/18/2018, 19    See problem list     Complex cyst of left ovary      Female stress incontinence      Hypertension      Migraine        PSHx:   Past Surgical History:   Procedure Laterality Date     CONIZATION N/A 2022    Procedure: Pelvic exam under anesthesia, cold knife conization cervix, ECC;  Surgeon: Fouzia Young MD;  Location: [facility] OR     LAPAROSCOPIC SALPINGO-OOPHORECTOMY N/A 2021    Procedure: Laparoscopic left salpingo-oophorectomy, Pelvic Washing, Cervical Biopsy, lysis of adhesions <20 mins;  Surgeon: Fouzia Young MD;  Location: [facility] OR     PANNICULECTOMY         Medications:   Current Outpatient Medications   Medication Instructions     acetaminophen (TYLENOL) 650 mg, Oral, EVERY 6 HOURS PRN     acetaminophen (TYLENOL) 975 mg, Oral, EVERY 6 HOURS PRN     Emollient (AQUAPHOR ADVANCED THERAPY) OINT Apply externally, 3 TIMES DAILY PRN     hydrochlorothiazide (HYDRODIURIL) 25 MG tablet  "TAKE 1 TABLET(25 MG) BY MOUTH DAILY     ibuprofen (ADVIL/MOTRIN) 600 mg, Oral, EVERY 6 HOURS PRN     ibuprofen (ADVIL/MOTRIN) 800 mg, Oral, EVERY 6 HOURS PRN     Multiple Vitamin (MULTIVITAMIN ADULT) TABS 1 tablet, Oral, EVERY MORNING     oxyCODONE (ROXICODONE) 5 mg, Oral, EVERY 8 HOURS PRN     phentermine (ADIPEX-P) 37.5 mg, Oral, EVERY MORNING BEFORE BREAKFAST     senna-docusate (SENOKOT-S/PERICOLACE) 8.6-50 MG tablet 2 tablets, Oral, 2 TIMES DAILY PRN     topiramate (TOPAMAX) 100 MG tablet TAKE ONE TABLET BY MOUTH EVERY NIGHT AT BEDTIME WITH 50MG TABLET     topiramate (TOPAMAX) 50 MG tablet TAKE ONE TABLET BY MOUTH EVERY NIGHT AT BEDTIME WITH 100MG TABLET     UNABLE TO FIND EVERY MORNING, MEDICATION NAME: Ross Breen daily     vitamin D3 (CHOLECALCIFEROL) 4,000 Units, Oral, DAILY       Allergies:   Allergies   Allergen Reactions     Morphine Itching       Social History:   Social History     Tobacco Use     Smoking status: Light Tobacco Smoker     Packs/day: 1.00     Years: 15.00     Pack years: 15.00     Types: Cigarettes     Smokeless tobacco: Never Used     Tobacco comment: smokes 1/2 ppd (2/2/22)   Substance Use Topics     Alcohol use: Yes     Comment: 2-3 week     Drug use: No       Physical Exam:   Patient Vitals for the past 24 hrs:   BP Temp Temp src Pulse Resp SpO2 Height Weight   02/19/22 0440 (!) 143/98 -- -- 88 -- -- -- --   02/19/22 0439 -- -- -- -- 18 100 % -- --   02/19/22 0410 (!) 143/98 -- -- 85 15 100 % -- --   02/19/22 0342 -- -- -- 89 15 100 % -- --   02/19/22 0340 (!) 158/106 -- -- -- -- -- -- --   02/19/22 0311 -- -- -- -- 18 100 % -- --   02/19/22 0310 (!) 158/112 -- -- 99 -- -- -- --   02/19/22 0240 (!) 160/113 -- -- 94 14 100 % -- --   02/19/22 0235 (!) 145/107 -- -- 95 -- -- -- --   02/19/22 0222 (!) 164/103 98  F (36.7  C) Oral 98 16 -- 1.588 m (5' 2.5\") 77.1 kg (170 lb)     Gen:  Resting comfortably, NAD  CV:  Well perfused  Pulm:  NWOB  Abd:  Soft, mildly tender lower " abdomen  Gyn: Approximately 200cc of dark red clot evacuated from vault followed by ongoing bright red bleeding. Unable to fully visualize cone bed due to bleeding, positioning in ED bed, and patient discomfort (despite fentanyl). Monsels applied and vagina packed with 1 roll of kerlix. Foul odor noted when removing clots.    Labs:  Results for orders placed or performed during the hospital encounter of 02/19/22   INR     Status: Normal   Result Value Ref Range    INR 0.89 0.86 - 1.14   Partial thromboplastin time     Status: Normal   Result Value Ref Range    aPTT 30 22 - 38 Seconds   CBC with platelets and differential     Status: Abnormal   Result Value Ref Range    WBC Count 11.6 (H) 4.0 - 11.0 10e3/uL    RBC Count 5.14 3.80 - 5.20 10e6/uL    Hemoglobin 13.8 11.7 - 15.7 g/dL    Hematocrit 42.2 35.0 - 47.0 %    MCV 82 78 - 100 fL    MCH 26.8 26.5 - 33.0 pg    MCHC 32.7 31.5 - 36.5 g/dL    RDW 16.8 (H) 10.0 - 15.0 %    Platelet Count 343 150 - 450 10e3/uL    % Neutrophils 42 %    % Lymphocytes 43 %    % Monocytes 11 %    % Eosinophils 3 %    % Basophils 1 %    % Immature Granulocytes 0 %    NRBCs per 100 WBC 0 <1 /100    Absolute Neutrophils 4.9 1.6 - 8.3 10e3/uL    Absolute Lymphocytes 5.0 0.8 - 5.3 10e3/uL    Absolute Monocytes 1.3 0.0 - 1.3 10e3/uL    Absolute Eosinophils 0.3 0.0 - 0.7 10e3/uL    Absolute Basophils 0.1 0.0 - 0.2 10e3/uL    Absolute Immature Granulocytes 0.0 <=0.4 10e3/uL    Absolute NRBCs 0.0 10e3/uL   CBC with platelets differential     Status: Abnormal    Narrative    The following orders were created for panel order CBC with platelets differential.  Procedure                               Abnormality         Status                     ---------                               -----------         ------                     CBC with platelets and d...[[id number]]  Abnormal            Final result                 Please view results for these tests on the individual orders.     A&P:   Elizabeth FLOR  Jeffery is a 42 year old who presents to the ED with vaginal hemorrhage s/p CKC on  for PASTORA 3. Approximately 200 ml of clot removed from vagina with ongoing active bleeding. Patient does not accept any blood products. Recommend taking patient to OR for EUA to better control bleeding given visualization limited, pain control, and refusal of blood products. Patient currently stable with hemoglobin of 13.8. Last ate at 2200.    - Patient consented for EUA, cauterization of bleeding. Discussed r/b/a including infection, bleeding, injury to surrounding structures. Informed consent obtained. Questions answered. Patient does not accept blood. She does accept medications that contain blood products or animal/human products. We discussed if she would accept blood in a catastrophic event where not receiving blood would result in death. She is unsure and will let us know before surgery.  - To OR as soon as possible. Discussed with anesthesia. Orders placed. 200mg doxycycline for prophylaxis. Will give 1g IV TXA now.    Patient seen and discussed with Dr. Temple.    Torrey Barnes MD MPH  OB/Gyn PGY-3  22 3:45 AM      Physician Attestation   I, Екатерина Temple MD, saw this patient with the resident and agree with the resident/fellow's findings and plan of care as documented in the note.      I personally reviewed vital signs, medications, labs and exam.    Key findings: 42 year old  with severe vaginal bleeding 12 days post op status post cold knife cone for PASTORA 3. Bleeding from cone bed but unable to adequately visualize in ED due to brisk bleeding. Vagina packed with monsels and kerlex. Ordered TXA 1g IV now. Recommend exam under anesthesia and cauterization of bleeding in OR. Patient currently hemodynamically stable and Hgb today 13.8 but due to heavy ongoing bleeding recommend moving to OR emergently. She declines blood products. We had a discussion about if she would accept a blood transfusion  if she was having life threatening bleeding and she wants to think about it. Discussed plan with patient, her ficristina Bunn who is here and her mother over the phone. They voiced understanding about procedure and risks and had opportunity to ask questions. OR and anesthesiologist notified.     Екатерина Temple MD  Date of Service (when I saw the patient): 02/19/22

## 2022-02-19 NOTE — ED NOTES
Awaiting patient transport to OR, ETA 30 minutes. Will infuse TXA as ordered when received from pharmacy. Patient continues to deny chest pain, shortness of breath, and light headedness. Vital signs stable. Criss Ruby RN on 2/19/2022 at 5:02 AM

## 2022-02-22 ENCOUNTER — VIRTUAL VISIT (OUTPATIENT)
Dept: ONCOLOGY | Facility: CLINIC | Age: 42
End: 2022-02-22
Attending: OBSTETRICS & GYNECOLOGY
Payer: COMMERCIAL

## 2022-02-22 DIAGNOSIS — N89.8 VAGINAL ITCHING: Primary | ICD-10-CM

## 2022-02-22 PROCEDURE — G0463 HOSPITAL OUTPT CLINIC VISIT: HCPCS | Mod: PN,RTG | Performed by: OBSTETRICS & GYNECOLOGY

## 2022-02-22 PROCEDURE — 99213 OFFICE O/P EST LOW 20 MIN: CPT | Mod: 24 | Performed by: OBSTETRICS & GYNECOLOGY

## 2022-02-22 RX ORDER — FLUCONAZOLE 150 MG/1
150 TABLET ORAL ONCE
Qty: 1 TABLET | Refills: 0 | Status: SHIPPED | OUTPATIENT
Start: 2022-02-22 | End: 2022-02-22

## 2022-02-22 NOTE — PATIENT INSTRUCTIONS
Nothing per vagina x four weeks and can then resume normal activities.    Reviewed recommendations for follow-up per ASCCP guidelines with HPV based testing in six months (August).  If negative, annual HPV based testing x 3, if all negative then  HPV based testing every three years for 25 years.    Follow-up with her regular Ob/Gyn for surveillance in August.      No further Gyn Onc care needed.    Fouzia Young MD  Gynecologic Oncology  [facility]

## 2022-02-22 NOTE — LETTER
2/22/2022         RE: Elizabeth Gil  [address] [place] [address]        Dear Colleague,    Thank you for referring your patient, Elizabeth Gil, to the [facility]. Please see a copy of my visit note below.    Elizabeth is a 42 year old who is being evaluated via a billable video visit.      How would you like to obtain your AVS? MyChart  If the video visit is dropped, the invitation should be resent by: Text to cell phone: [phone]  Will anyone else be joining your video visit? No                              Consult Notes on Referred Patient    Date: 2/22/2022       Patient seen today on video visit for follow-up.     She is status post laparoscopic left salpingo-oophorectomy, pelvic washings, JAIRO x 20 minutes for pelvic mass and HSIL pap smear, HR HPV +.  Pathology (reviewed) showed left hemorrhagic corpus luteal cyst, no malignancy.  Biopsy cervix with CIN3.     2/7/22:  EUA, CKC, ECC.  Path with CIN3 extending into endocervical glands, margins negtive, ECC negative.    2/19/22:  EUA for bleeding, bleeding areas on cervix cauterized    Feeling better, minimal dark drainage, no other complaints.       Past Medical History:    Past Medical History:   Diagnosis Date     Abnormal Pap smear of cervix 10/18/2018    10/18/18: See problem list.      Cervical high risk HPV (human papillomavirus) test positive 10/18/2018, 6/12/19    See problem list     Complex cyst of left ovary      Female stress incontinence      Hypertension      Migraine          Past Surgical History:    Past Surgical History:   Procedure Laterality Date     CONIZATION N/A 2/7/2022    Procedure: Pelvic exam under anesthesia, cold knife conization cervix, ECC;  Surgeon: Fouzia Young MD;  Location: [facility] OR     EXAM UNDER ANESTHESIA PELVIC N/A 2/19/2022    Procedure: EXAM UNDER ANESTHESIA, PELVIS, cervical cauterization;  Surgeon: Екатерина Temple MD;  Location: [facility] OR     LAPAROSCOPIC  SALPINGO-OOPHORECTOMY N/A 12/7/2021    Procedure: Laparoscopic left salpingo-oophorectomy, Pelvic Washing, Cervical Biopsy, lysis of adhesions <20 mins;  Surgeon: Fouzia Young MD;  Location: [facility] OR     PANNICULECTOMY  2012         Health Maintenance:  Health Maintenance Due   Topic Date Due     ADVANCE CARE PLANNING  Never done     DEPRESSION ACTION PLAN  Never done     Pneumococcal Vaccine: Pediatrics (0 to 5 Years) and At-Risk Patients (6 to 64 Years) (1 of 2 - PPSV23) Never done     HEPATITIS C SCREENING  Never done     COLPOSCOPY  09/13/2019     PREVENTIVE CARE VISIT  10/18/2019     INFLUENZA VACCINE (1) 09/01/2021     COVID-19 Vaccine (3 - Booster for Moderna series) 02/08/2022     HPV TEST  04/26/2022     PAP  04/26/2022         Current Medications:     has a current medication list which includes the following prescription(s): acetaminophen, acetaminophen, aquaphor advanced therapy, hydrochlorothiazide, ibuprofen, ibuprofen, multivitamin adult, oxycodone, phentermine, senna-docusate, topiramate, topiramate, UNABLE TO FIND, and vitamin d3.       Allergies:     Morphine        Social History:     Social History     Tobacco Use     Smoking status: Light Tobacco Smoker     Packs/day: 1.00     Years: 15.00     Pack years: 15.00     Types: Cigarettes     Smokeless tobacco: Never Used     Tobacco comment: smokes 1/2 ppd (2/2/22)   Substance Use Topics     Alcohol use: Yes     Comment: 2-3 week       History   Drug Use No           Family History:     The patient's family history is notable for :    Family History   Problem Relation Age of Onset     Thyroid Disease Mother      Heart Disease Mother      Hypertension Mother      Obesity Mother      Colon Cancer Father      Aneurysm Father      Hypertension Father      Obesity Father      Ovarian Cancer Maternal Aunt      Breast Cancer No family hx of      Uterine Cancer No family hx of      Anesthesia Reaction No family hx of      Deep Vein Thrombosis (DVT) No family  hx of      Physical Exam:     LMP 01/31/2022   There is no height or weight on file to calculate BMI.        Assessment:     Elizabeth Gil is a 42 year old woman status post resection of left ovarian hemorrhagic cyst and HSIL/CIN3 status post CKC        30 minutes spent on the date of the encounter doing chart review, history and exam, documentation and further activities as noted above     TT video 15 min       Plan:      1.)        Status post resection of left ovarian hemorrhagic cyst:  Doing well, pathology reviewed.  No additional treatment needed. Okay to resume normal activities as tolerated     2.)        Urinary symtoms:  Related to baseline issues.  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  She is feeling better now, minimal bleeding.  Pathology with negative margins. Recommended nothing per vagina for four weeks.  Reviewed recommendations for follow-up per ASCCP guidelines with  HPV based testing in six months (August).  If negative, annual HPV based testing x 3, if all negative then  HPV based testing every three years for 25 years.    4.)       Vaginal itching:  Post recent evaluation, thinks she has a yeast infection, will prescribe Diflucan    Questions answered, she will follow-up with her regular Ob/Gyn for surveillance in August.  No further Gyn Onc care needed.      Again, thank you for allowing me to participate in the care of your patient.      Sincerely,    Fouzia Young MD

## 2022-02-22 NOTE — PROGRESS NOTES
Elizabeth is a 42 year old who is being evaluated via a billable video visit.      How would you like to obtain your AVS? MyChart  If the video visit is dropped, the invitation should be resent by: Text to cell phone: [phone]  Will anyone else be joining your video visit? No        Video-Visit Details    Type of service:  Video Visit    Originating Location (pt. Location): Home    Distant Location (provider location):  [facility]     Platform used for Video Visit: Liyah Peng                            Consult Notes on Referred Patient    Date: 2/22/2022       Patient seen today on video visit for follow-up.     She is status post laparoscopic left salpingo-oophorectomy, pelvic washings, JAIRO x 20 minutes for pelvic mass and HSIL pap smear, HR HPV +.  Pathology (reviewed) showed left hemorrhagic corpus luteal cyst, no malignancy.  Biopsy cervix with CIN3.     2/7/22:  EUA, CKC, ECC.  Path with CIN3 extending into endocervical glands, margins negtive, ECC negative.    2/19/22:  EUA for bleeding, bleeding areas on cervix cauterized    Feeling better, minimal dark drainage, no other complaints.       Past Medical History:    Past Medical History:   Diagnosis Date     Abnormal Pap smear of cervix 10/18/2018    10/18/18: See problem list.      Cervical high risk HPV (human papillomavirus) test positive 10/18/2018, 6/12/19    See problem list     Complex cyst of left ovary      Female stress incontinence      Hypertension      Migraine          Past Surgical History:    Past Surgical History:   Procedure Laterality Date     CONIZATION N/A 2/7/2022    Procedure: Pelvic exam under anesthesia, cold knife conization cervix, ECC;  Surgeon: Fouzia Young MD;  Location: [facility] OR     EXAM UNDER ANESTHESIA PELVIC N/A 2/19/2022    Procedure: EXAM UNDER ANESTHESIA, PELVIS, cervical cauterization;  Surgeon: Екатерина Temple MD;  Location: [facility] OR     LAPAROSCOPIC  SALPINGO-OOPHORECTOMY N/A 12/7/2021    Procedure: Laparoscopic left salpingo-oophorectomy, Pelvic Washing, Cervical Biopsy, lysis of adhesions <20 mins;  Surgeon: Fouzia Young MD;  Location: [facility] OR     PANNICULECTOMY  2012         Health Maintenance:  Health Maintenance Due   Topic Date Due     ADVANCE CARE PLANNING  Never done     DEPRESSION ACTION PLAN  Never done     Pneumococcal Vaccine: Pediatrics (0 to 5 Years) and At-Risk Patients (6 to 64 Years) (1 of 2 - PPSV23) Never done     HEPATITIS C SCREENING  Never done     COLPOSCOPY  09/13/2019     PREVENTIVE CARE VISIT  10/18/2019     INFLUENZA VACCINE (1) 09/01/2021     COVID-19 Vaccine (3 - Booster for Moderna series) 02/08/2022     HPV TEST  04/26/2022     PAP  04/26/2022         Current Medications:     has a current medication list which includes the following prescription(s): acetaminophen, acetaminophen, aquaphor advanced therapy, hydrochlorothiazide, ibuprofen, ibuprofen, multivitamin adult, oxycodone, phentermine, senna-docusate, topiramate, topiramate, UNABLE TO FIND, and vitamin d3.       Allergies:     Morphine        Social History:     Social History     Tobacco Use     Smoking status: Light Tobacco Smoker     Packs/day: 1.00     Years: 15.00     Pack years: 15.00     Types: Cigarettes     Smokeless tobacco: Never Used     Tobacco comment: smokes 1/2 ppd (2/2/22)   Substance Use Topics     Alcohol use: Yes     Comment: 2-3 week       History   Drug Use No           Family History:     The patient's family history is notable for :    Family History   Problem Relation Age of Onset     Thyroid Disease Mother      Heart Disease Mother      Hypertension Mother      Obesity Mother      Colon Cancer Father      Aneurysm Father      Hypertension Father      Obesity Father      Ovarian Cancer Maternal Aunt      Breast Cancer No family hx of      Uterine Cancer No family hx of      Anesthesia Reaction No family hx of      Deep Vein Thrombosis (DVT) No family  hx of          Physical Exam:     LMP 01/31/2022   There is no height or weight on file to calculate BMI.        Assessment:     Elizabeth Gil is a 42 year old woman status post resection of left ovarian hemorrhagic cyst and HSIL/CIN3 status post CKC        30 minutes spent on the date of the encounter doing chart review, history and exam, documentation and further activities as noted above     TT video 15 min       Plan:      1.)        Status post resection of left ovarian hemorrhagic cyst:  Doing well, pathology reviewed.  No additional treatment needed. Okay to resume normal activities as tolerated     2.)        Urinary symtoms:  Related to baseline issues.  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  She is feeling better now, minimal bleeding.  Pathology with negative margins. Recommended nothing per vagina for four weeks.  Reviewed recommendations for follow-up per ASCCP guidelines with  HPV based testing in six months (August).  If negative, annual HPV based testing x 3, if all negative then  HPV based testing every three years for 25 years.    4.)       Vaginal itching:  Post recent evaluation, thinks she has a yeast infection, will prescribe Diflucan    Questions answered, she will follow-up with her regular Ob/Gyn for surveillance in August.  No further Gyn Onc care needed.        Fouzia Young MD  Gynecologic Oncology  [facility]      CC  Patient Care Team:  [facility] as PCP - General  Phu Varma MD as Assigned Musculoskeletal Provider  Tiffany Sanchez MD as MD (Surgery)  PEYTON Fernandez MD as Referring Physician (Plastic Surgery)  Kirk Redd MD as Assigned OBGYN Provider  Valerie Cortez PA-C as Assigned Surgical Provider  Fouzia Young MD as MD (Gynecologic  Oncology)  Екатерина Lanier MD as Referring Physician (OB/Gyn)  Rafa Young MD as Assigned Cancer Care Provider  Basil Cano MD as Assigned PCP  RAFA YOUNG

## 2022-03-30 ENCOUNTER — LAB (OUTPATIENT)
Dept: LAB | Facility: CLINIC | Age: 42
End: 2022-03-30
Payer: COMMERCIAL

## 2022-03-30 DIAGNOSIS — Z20.822 CLOSE EXPOSURE TO 2019 NOVEL CORONAVIRUS: ICD-10-CM

## 2022-03-30 PROCEDURE — U0005 INFEC AGEN DETEC AMPLI PROBE: HCPCS

## 2022-03-30 PROCEDURE — U0003 INFECTIOUS AGENT DETECTION BY NUCLEIC ACID (DNA OR RNA); SEVERE ACUTE RESPIRATORY SYNDROME CORONAVIRUS 2 (SARS-COV-2) (CORONAVIRUS DISEASE [COVID-19]), AMPLIFIED PROBE TECHNIQUE, MAKING USE OF HIGH THROUGHPUT TECHNOLOGIES AS DESCRIBED BY CMS-2020-01-R: HCPCS

## 2022-03-31 LAB — SARS-COV-2 RNA RESP QL NAA+PROBE: NEGATIVE

## 2022-05-02 ENCOUNTER — APPOINTMENT (OUTPATIENT)
Dept: GENERAL RADIOLOGY | Facility: CLINIC | Age: 42
End: 2022-05-02
Attending: NURSE PRACTITIONER
Payer: COMMERCIAL

## 2022-05-02 ENCOUNTER — HOSPITAL ENCOUNTER (EMERGENCY)
Facility: CLINIC | Age: 42
Discharge: HOME | End: 2022-05-02
Attending: EMERGENCY MEDICINE | Admitting: EMERGENCY MEDICINE
Payer: COMMERCIAL

## 2022-05-02 VITALS
SYSTOLIC BLOOD PRESSURE: 174 MMHG | BODY MASS INDEX: 32.43 KG/M2 | DIASTOLIC BLOOD PRESSURE: 90 MMHG | HEART RATE: 96 BPM | OXYGEN SATURATION: 100 % | HEIGHT: 63 IN | WEIGHT: 183 LBS | RESPIRATION RATE: 16 BRPM | TEMPERATURE: 98.6 F
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
## 2022-05-02 DIAGNOSIS — M25.461 EFFUSION OF BOTH KNEE JOINTS: Primary | ICD-10-CM

## 2022-05-02 DIAGNOSIS — M25.462 EFFUSION OF BOTH KNEE JOINTS: Primary | ICD-10-CM

## 2022-05-02 PROCEDURE — 99284 EMERGENCY DEPT VISIT MOD MDM: CPT | Performed by: EMERGENCY MEDICINE

## 2022-05-02 PROCEDURE — 73564 X-RAY EXAM KNEE 4 OR MORE: CPT | Mod: 26 | Performed by: RADIOLOGY

## 2022-05-02 PROCEDURE — 999N000065 XR KNEE BILATERAL G/E 4 VW

## 2022-05-02 RX ORDER — OXYCODONE HYDROCHLORIDE 5 MG/1
5 TABLET ORAL EVERY 4 HOURS PRN
Qty: 10 TABLET | Refills: 0 | Status: SHIPPED | OUTPATIENT
Start: 2022-05-02 | End: 2022-05-05

## 2022-05-02 RX ORDER — NAPROXEN 500 MG/1
500 TABLET ORAL 2 TIMES DAILY WITH MEALS
Qty: 30 TABLET | Refills: 0 | Status: SHIPPED | OUTPATIENT
Start: 2022-05-02 | End: 2022-05-17

## 2022-05-02 ASSESSMENT — ENCOUNTER SYMPTOMS
JOINT SWELLING: 1
COLOR CHANGE: 0
RESPIRATORY NEGATIVE: 1
NECK PAIN: 0
EYES NEGATIVE: 1
CONSTITUTIONAL NEGATIVE: 1
CARDIOVASCULAR NEGATIVE: 1
HEMATOLOGIC/LYMPHATIC NEGATIVE: 1
GASTROINTESTINAL NEGATIVE: 1
NECK STIFFNESS: 0

## 2022-05-02 NOTE — ED TRIAGE NOTES
Pt presents to the ER with c/o Knee Pain. Pt fell last night on her knees. Pt did not have LOC. Pt L knee is swollen and R knee hurts. Pt is A/Ox 4. Pt is having difficulty walking and states the pain is 8-9/10 when walking.     Triage Assessment     Row Name 05/02/22 1231       Triage Assessment (Adult)    Airway WDL WDL       Respiratory WDL    Respiratory WDL WDL       Skin Circulation/Temperature WDL    Skin Circulation/Temperature WDL WDL       Cardiac WDL    Cardiac WDL WDL       Peripheral/Neurovascular WDL    Peripheral Neurovascular WDL WDL       Cognitive/Neuro/Behavioral WDL    Cognitive/Neuro/Behavioral WDL WDL

## 2022-05-02 NOTE — LETTER
May 2, 2022      To Whom It May Concern:      Olga Espinosa was in the emergency department with a family member today.  He should be excused from work.  Sincerely,        Luis Carlos Ziegler, DO

## 2022-05-02 NOTE — ED PROVIDER NOTES
"ED Provider Note  [facility]      History     Chief Complaint   Patient presents with     Joint Swelling     HPI  Elizabeth Gil is a 42 year old female with a history of HTN, obesity, stress incontinence, and PASTORA III who presents to the emergency department with bilateral knee pain and swelling. Patient was getting into a hot tub yesterday and slipped when part way in falling onto her knees. Reports left knee has more swelling and both knees have pain. Patient states she has baseline problems with right knee and has been doing physical therapy. States that now that left knee is painful she is unable to bear weight because right knee \"gives out\" when she tries to put more pressure on it to walk. She denies hitting her head or any other injuries from the fall.     Past Medical History  Past Medical History:   Diagnosis Date     Abnormal Pap smear of cervix 10/18/2018    10/18/18: See problem list.      Cervical high risk HPV (human papillomavirus) test positive 10/18/2018, 6/12/19    See problem list     Complex cyst of left ovary      Female stress incontinence      Hypertension      Migraine      Past Surgical History:   Procedure Laterality Date     CONIZATION N/A 2/7/2022    Procedure: Pelvic exam under anesthesia, cold knife conization cervix, ECC;  Surgeon: Fouzia Young MD;  Location: [facility] OR     EXAM UNDER ANESTHESIA PELVIC N/A 2/19/2022    Procedure: EXAM UNDER ANESTHESIA, PELVIS, cervical cauterization;  Surgeon: Екатерина Temple MD;  [facility]: [facility]     LAPAROSCOPIC SALPINGO-OOPHORECTOMY N/A 12/7/2021    Procedure: Laparoscopic left salpingo-oophorectomy, Pelvic Washing, Cervical Biopsy, lysis of adhesions <20 mins;  Surgeon: Fouzia Young MD;  Location: [facility] OR     PANNICULECTOMY  2012     naproxen (NAPROSYN) 500 MG tablet  oxyCODONE (ROXICODONE) 5 MG tablet  acetaminophen (TYLENOL) 325 MG tablet  acetaminophen (TYLENOL) 325 MG tablet  Emollient (AQUAPHOR ADVANCED " THERAPY) OINT  hydrochlorothiazide (HYDRODIURIL) 25 MG tablet  ibuprofen (ADVIL/MOTRIN) 600 MG tablet  ibuprofen (ADVIL/MOTRIN) 800 MG tablet  Multiple Vitamin (MULTIVITAMIN ADULT) TABS  phentermine (ADIPEX-P) 37.5 MG tablet  senna-docusate (SENOKOT-S/PERICOLACE) 8.6-50 MG tablet  topiramate (TOPAMAX) 100 MG tablet  topiramate (TOPAMAX) 50 MG tablet  UNABLE TO FIND  vitamin D3 (CHOLECALCIFEROL) 2000 units (50 mcg) tablet      Allergies   Allergen Reactions     Morphine Itching     Family History  Family History   Problem Relation Age of Onset     Thyroid Disease Mother      Heart Disease Mother      Hypertension Mother      Obesity Mother      Colon Cancer Father      Aneurysm Father      Hypertension Father      Obesity Father      Ovarian Cancer Maternal Aunt      Breast Cancer No family hx of      Uterine Cancer No family hx of      Anesthesia Reaction No family hx of      Deep Vein Thrombosis (DVT) No family hx of      Social History   Social History     Tobacco Use     Smoking status: Light Tobacco Smoker     Packs/day: 1.00     Years: 15.00     Pack years: 15.00     Types: Cigarettes     Smokeless tobacco: Never Used     Tobacco comment: smokes 1/2 ppd (2/2/22)   Substance Use Topics     Alcohol use: Yes     Comment: 2-3 week     Drug use: No      Past medical history, past surgical history, medications, allergies, family history, and social history were reviewed with the patient. No additional pertinent items.       Review of Systems   Constitutional: Negative.    HENT: Negative.    Eyes: Negative.    Respiratory: Negative.    Cardiovascular: Negative.    Gastrointestinal: Negative.    Musculoskeletal: Positive for gait problem and joint swelling. Negative for neck pain and neck stiffness.   Skin: Negative.  Negative for color change.   Hematological: Negative.      A complete review of systems was performed with pertinent positives and negatives noted in the HPI, and all other systems negative.    Physical  "Exam   BP: (!) 174/90  Pulse: 96  Temp: 98.6  F (37  C)  Resp: 16  Height: 160 cm (5' 3\")  Weight: 83 kg (183 lb)  SpO2: 100 %  Physical Exam  Constitutional:       General: She is not in acute distress.     Appearance: Normal appearance.   HENT:      Head: Normocephalic and atraumatic.   Cardiovascular:      Rate and Rhythm: Normal rate and regular rhythm.      Heart sounds: Normal heart sounds.   Pulmonary:      Effort: Pulmonary effort is normal.   Musculoskeletal:      Right knee: No erythema, ecchymosis or bony tenderness. Tenderness present over the medial joint line and lateral joint line. No patellar tendon tenderness.      Left knee: Swelling present. No erythema, ecchymosis or bony tenderness. Decreased range of motion. Tenderness present over the medial joint line and lateral joint line. No patellar tendon tenderness.   Skin:     General: Skin is warm and dry.      Capillary Refill: Capillary refill takes less than 2 seconds.      Findings: No bruising or erythema.   Neurological:      General: No focal deficit present.      Mental Status: She is alert and oriented to person, place, and time.   Psychiatric:         Mood and Affect: Mood normal.         Behavior: Behavior normal.         ED Course      Procedures       I examined patient in VTA.  I discussed xray results and physical exam with ED physician.     Results for orders placed or performed during the hospital encounter of 05/02/22   XR Knee Bilateral G/E 4 Views     Status: None    Narrative    3 views right and left knee radiographs 5/2/2022 3:19 PM    History: fall yesterday onto knees, swelling, pain    Comparison: Right knee radiograph 9/10/2020, MR 9/23/2020    Findings:    AP view of bilateral knees, lateral and patellofemoral views of the  right and left knee were obtained.     Left: No acute osseous abnormality. Suspect moderate to large joint  effusion joint effusion.    Preservation of joint spaces.     No patellar tilt or lateral " subluxation.  Soft tissue is unremarkable.    Right: Apparent lateral tibial plateau contour alteration, may be  projectional with superimposed osteophyte.    Moderate to large joint effusion with likely intra-articular body in  the suprapatellar recess.    Osteophytes with moderate medial compartment joint space loss with  lateral translation of proximal tibia relative to distal femur.     No patellar tilt or lateral subluxation.  Soft tissue is unremarkable.      Impression    Impression:  1. Apparent lateral tibial plateau contour alteration, may be  projectional with superimposed osteophyte, especially symptom marker  placed medially. Otherwise, no acute osseous abnormality.  2. Moderate right knee degenerative change with moderate medial  compartment joint space loss.  3. Suspect moderate to large bilateral knee joint effusion.    [place]         SYSTEM ID:  [id number]     Medications - No data to display     Assessments & Plan (with Medical Decision Making)   This is a 42 year old female who presented to the ED for evaluation of bilateral knee pain following a fall onto her knees yesterday. Patient was vital signs stable in triage.    Patient was sent for xray of bilateral knees. I reviewed images as well as radiologists report. xray showed right lateral tibial plateau contour alteration, moderate right knee degenerative change with moderate medial joint space loss and moderate to large bilateral knee joint effusion. Patient stated desire to have knee effusions drained in emergency department today, informed patient that unfortunately we are unable to do that in the emergency department. Recommended follow-up with primary care provider. Gave prescription for naproxen and oxycodone.       I discussed patient presentation and imaging results with Shahid Ziegler DO who agreed to assess patient.        I have reviewed the nursing notes. I have reviewed the findings, diagnosis, plan and need for follow up  with the patient.    New Prescriptions    NAPROXEN (NAPROSYN) 500 MG TABLET    Take 1 tablet (500 mg) by mouth 2 times daily (with meals) for 15 days    OXYCODONE (ROXICODONE) 5 MG TABLET    Take 1 tablet (5 mg) by mouth every 4 hours as needed for pain       Final diagnoses:   Effusion of both knee joints   Fall, initial encounter       --  X[facility] Hendrix [facility] EMERGENCY DEPARTMENT  5/2/2022      --    ED Attending Physician Attestation    I Luis Carlos Ziegler DO, cared for this patient with the Advanced Practice Provider (GARY). I have performed a history and physical examination of the patient independent of the GARY. I reviewed the GARY's documentation above and agree with the documented findings and plan of care. I personally provided a substantive portion of the care for this patient.  I personally performed the substantive portion of the medical decision making for this visit - please see the GARY's documentation for full details.        Summary of HPI, PE, ED Course   Patient is a 42 year old female evaluated in the emergency department for knee pain. Exam notable for tenderness of left knee. ED course notable for no fractures on x-ray. After the completion of care in the emergency department, the patient was discharged.    Critical Care & Procedures  Not applicable.    Medical Decision Making  The medical record was reviewed and interpreted.  Current images reviewed and interpreted:  .  Managed outpatient prescription medications.      Luis Carlos Ziegler DO  Emergency Medicine          Luis Carlos Ziegler DO  05/06/22 0023

## 2022-05-03 ENCOUNTER — PATIENT OUTREACH (OUTPATIENT)
Dept: CARE COORDINATION | Facility: CLINIC | Age: 42
End: 2022-05-03
Payer: COMMERCIAL

## 2022-05-03 DIAGNOSIS — Z71.89 OTHER SPECIFIED COUNSELING: ICD-10-CM

## 2022-05-03 NOTE — PROGRESS NOTES
Clinic Care Coordination Contact  [facility]/Voicemail       Clinical Data: Care Coordinator Outreach    Outreach attempted x 1.  Left message on patient's voicemail with call back information and requested return call.    Plan:  Care Coordinator will try to reach patient again in 1-2 business days.    Colleen Cunningham, [facility]  [phone]  [facility]

## 2022-05-04 NOTE — PROGRESS NOTES
"Clinic Care Coordination Contact  [facility]: Post-Discharge Note  SITUATION                                                      Admission:    Admission Date: 05/02/22   Reason for Admission: Joint Swelling  Discharge:   Discharge Date: 05/02/22  Discharge Diagnosis: Joint Swelling    BACKGROUND                                                      Per hospital discharge summary and inpatient provider notes:    Elizabeth Gil is a 42 year old female with a history of HTN, obesity, stress incontinence, and PASTORA III who presents to the emergency department with bilateral knee pain and swelling. Patient was getting into a hot tub yesterday and slipped when part way in falling onto her knees. Reports left knee has more swelling and both knees have pain. Patient states she has baseline problems with right knee and has been doing physical therapy. States that now that left knee is painful she is unable to bear weight because right knee \"gives out\" when she tries to put more pressure on it to walk. She denies hitting her head or any other injuries from the fall.        ASSESSMENT      Enrollment  Primary Care Care Coordination Status: Declined    Discharge Assessment  How are you doing now that you are home?: Patient reports she is doing well, no questions  How are your symptoms? (Red Flag symptoms escalate to triage hotline per guidelines): Improved  Do you feel your condition is stable enough to be safe at home until your provider visit?: Yes  Does the patient have their discharge instructions? : Yes  Does the patient have questions regarding their discharge instructions? : No  Were you started on any new medications or were there changes to any of your previous medications? : Yes  Does the patient have all of their medications?: No (see comment) (going to  meds today)  Do you have questions regarding any of your medications? : No  Discharge follow-up appointment scheduled within 14 calendar days? : " Yes  Discharge Follow Up Appointment Date: 05/05/22  Discharge Follow Up Appointment Scheduled with?: Primary Care Provider                  PLAN                                                      Outpatient Plan:      Follow-up with your primary care provider. Return to the emergency  department as needed for any new or worsening symptoms.    Future Appointments   Date Time Provider Department Center   5/5/2022  9:40 AM Danna Enrique APRN CNP [facility]         For any urgent concerns, please contact our 24 hour nurse triage line: [phone] ([phone])         KATHARINA Palm  [phone]  [facility]

## 2022-05-05 ENCOUNTER — OFFICE VISIT (OUTPATIENT)
Dept: FAMILY MEDICINE | Facility: CLINIC | Age: 42
End: 2022-05-05
Payer: COMMERCIAL

## 2022-05-05 VITALS
HEIGHT: 64 IN | SYSTOLIC BLOOD PRESSURE: 160 MMHG | WEIGHT: 189 LBS | OXYGEN SATURATION: 98 % | DIASTOLIC BLOOD PRESSURE: 105 MMHG | BODY MASS INDEX: 32.27 KG/M2 | HEART RATE: 85 BPM | TEMPERATURE: 97.2 F

## 2022-05-05 DIAGNOSIS — Z00.00 ROUTINE GENERAL MEDICAL EXAMINATION AT A HEALTH CARE FACILITY: ICD-10-CM

## 2022-05-05 DIAGNOSIS — Z80.0 FAMILY HISTORY OF COLON CANCER: Primary | ICD-10-CM

## 2022-05-05 DIAGNOSIS — I10 HYPERTENSION, UNSPECIFIED TYPE: ICD-10-CM

## 2022-05-05 DIAGNOSIS — G43.809 OTHER MIGRAINE WITHOUT STATUS MIGRAINOSUS, NOT INTRACTABLE: ICD-10-CM

## 2022-05-05 DIAGNOSIS — R41.840 ATTENTION AND CONCENTRATION DEFICIT: ICD-10-CM

## 2022-05-05 DIAGNOSIS — Z12.4 SCREENING FOR CERVICAL CANCER: ICD-10-CM

## 2022-05-05 PROCEDURE — 99214 OFFICE O/P EST MOD 30 MIN: CPT | Mod: 25 | Performed by: NURSE PRACTITIONER

## 2022-05-05 PROCEDURE — 99396 PREV VISIT EST AGE 40-64: CPT | Performed by: NURSE PRACTITIONER

## 2022-05-05 RX ORDER — HYDROCHLOROTHIAZIDE 25 MG/1
25 TABLET ORAL DAILY
Qty: 90 TABLET | Refills: 3 | Status: SHIPPED | OUTPATIENT
Start: 2022-05-05 | End: 2023-05-03

## 2022-05-05 RX ORDER — VERAPAMIL HYDROCHLORIDE 180 MG/1
180 TABLET, EXTENDED RELEASE ORAL AT BEDTIME
Qty: 90 TABLET | Refills: 3 | Status: SHIPPED | OUTPATIENT
Start: 2022-05-05

## 2022-05-05 NOTE — PROGRESS NOTES
" SUBJECTIVE:   CC: Elizabeth Gil is an 42 year old woman who presents for preventive health visit.       Patient has been advised of split billing requirements and indicates understanding: Yes  Healthy Habits:  Answers for HPI/ROS submitted by the patient on 5/5/2022  Frequency of exercise:: 1 day/week  Getting at least 3 servings of Calcium per day:: Yes  Diet:: Regular (no restrictions)  Taking medications regularly:: Yes  Medication side effects:: None  Bi-annual eye exam:: Yes  Dental care twice a year:: NO  Sleep apnea or symptoms of sleep apnea:: Daytime drowsiness, Excessive snoring, Sleep apnea  Additional concerns today:: Yes  Duration of exercise:: 15-30 minutes    Hypertension Follow-up      Do you check your blood pressure regularly outside of the clinic? No     Are you following a low salt diet? No    Are your blood pressures ever more than 140 on the top number (systolic) OR more   than 90 on the bottom number (diastolic), for example 140/90? Yes    Migraine     Since your last clinic visit, how have your headaches changed?  No change    How often are you getting headaches or migraines? Weekly; takes Topamax occasionally which helps when she has a headache or feels one coming on; makes her feel foggy the next morning    Are you able to do normal daily activities when you have a migraine? Yes    Are you taking rescue/relief medications? (Select all that apply) No    Mood is stable. Has struggled a lot with sleep; often gets only 4-6 hours because she is so busy. 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 Typically only eats one meal per day. She is very frustrated with her weight. Has a history of feeling like there are multiple balls spinning in the air, and feels \"out of control.\" She has two teenage sons with ADHD and has wondered if she has it as well; She had difficulty with focus in school, and has problems with follow-through on tasks; and follow through on health " regimens and other things she needs to do. She has never had an ADHD assessment. She would also like to see a counselor.        Today's PHQ-2 Score:   PHQ-2 ( 1999 Pf[employer]) 2/2/2022 2/2/2022   Q1: Little interest or pleasure in doing things 2 2   Q2: Feeling down, depressed or hopeless 1 0   PHQ-2 Score 3 2   PHQ-2 Total Score (12-17 Years)- Positive if 3 or more points; Administer PHQ-A if positive - -       Abuse: Current or Past(Physical, Sexual or Emotional)- No  Do you feel safe in your environment? Yes    Have you ever done Advance Care Planning? (For example, a Health Directive, POLST, or a discussion with a medical provider or your loved ones about your wishes): No, advance care planning information given to patient to review.  Patient declined advance care planning discussion at this time.    Social History     Tobacco Use     Smoking status: Light Tobacco Smoker     Packs/day: 1.00     Years: 15.00     Pack years: 15.00     Types: Cigarettes     Smokeless tobacco: Never Used     Tobacco comment: smokes 1/2 ppd (2/2/22)   Substance Use Topics     Alcohol use: Yes     Comment: 2-3 week     If you drink alcohol do you typically have >3 drinks per day or >7 drinks per week? No                     Reviewed orders with patient.  Reviewed health maintenance and updated orders accordingly - Yes  Lab work is in process  Labs reviewed in EPIC    FHS-7: No flowsheet data found.  click delete button to remove this line now    Pertinent mammograms are reviewed under the imaging tab.    Pertinent mammograms are reviewed under the imaging tab.  History of abnormal Pap smear: YES - other categories - see link Cervical Cytology Screening Guidelines  PAP / HPV Latest Ref Rng & Units 10/26/2021 6/13/2019 10/18/2018   PAP   High-grade squamous intraepithelial lesion (HSIL) encompassing mod/severe dysplasia, CIS, CIN2, CIN3(A) - -   PAP (Historical) - - NIL ASC-US(A)   HPV16 Negative Negative Negative Negative   HPV18 Negative  Negative Negative Negative   HRHPV Negative Positive(A) Positive(A) Positive(A)     Reviewed and updated as needed this visit by clinical staff                    Reviewed and updated as needed this visit by Provider                       ROS:  CONSTITUTIONAL: NEGATIVE for fever, chills, change in weight  INTEGUMENTARU/SKIN: NEGATIVE for worrisome rashes, moles or lesions  EYES: NEGATIVE for vision changes or irritation  ENT: NEGATIVE for ear, mouth and throat problems  RESP: NEGATIVE for significant cough or SOB  BREAST: NEGATIVE for masses, tenderness or discharge  CV: NEGATIVE for chest pain, palpitations or peripheral edema  GI: NEGATIVE for nausea, abdominal pain, heartburn, or change in bowel habits  MUSCULOSKELETAL: NEGATIVE for significant arthralgias or myalgia  NEURO: NEGATIVE for weakness, dizziness or paresthesias  PSYCHIATRIC: NEGATIVE for changes in mood or affect    OBJECTIVE:   There were no vitals taken for this visit.  EXAM:  GENERAL: healthy, alert and no distress  EYES: Eyes grossly normal to inspection, PERRL and conjunctivae and sclerae normal  NECK: no adenopathy, no asymmetry, masses, or scars and thyroid normal to palpation  RESP: lungs clear to auscultation - no rales, rhonchi or wheezes  CV: regular rate and rhythm, normal S1 S2, no S3 or S4, no murmur, click or rub, no peripheral edema and peripheral pulses strong  ABDOMEN: soft, nontender, no hepatosplenomegaly, no masses and bowel sounds normal  MS: no gross musculoskeletal defects noted, no edema  SKIN: no suspicious lesions or rashes  NEURO: Normal strength and tone, mentation intact and speech normal  PSYCH: mentation appears normal, affect normal/bright    Diagnostic Test Results:  Labs reviewed in Epic  No results found for this or any previous visit (from the past 24 hour(s)).    ASSESSMENT/PLAN:       ICD-10-CM    1. Family history of colon cancer  Z80.0 Adult Gastro Ref - Procedure Only   2. Routine general medical examination  "at a health care facility  Z00.00    3. Screening for cervical cancer  Z12.4    4. Other migraine without status migrainosus, not intractable  G43.809 verapamil ER (CALAN-SR) 180 MG CR tablet   5. Attention and concentration deficit  R41.840 Adult Mental Health  Referral   6. Hypertension, unspecified type  I10 hydrochlorothiazide (HYDRODIURIL) 25 MG tablet     We will stop Topamx, and start verapamil for Migraine prevention, as she has noticed mental fogginess and is not taking it consistently for migraine prevention. We discussed stopping hydrochlorothiazide, but she feels this is helpful for lower leg swelling and wants to continue for now.  Referral for ADHD evaluation and counseling  Discussed eating more frequently; try protein shakes for on-the go meals; work  On cuting down on caffeine and getting more sleep; which will help weight and energy levels overall.  Advised follow up with OBGYN regarding pap screening; given number to schedule.  Patient has been advised of split billing requirements and indicates understanding: Yes  COUNSELING:   Reviewed preventive health counseling, as reflected in patient instructions       Regular exercise       Healthy diet/nutrition    Estimated body mass index is 32.42 kg/m  as calculated from the following:    Height as of 5/2/22: 1.6 m (5' 3\").    Weight as of 5/2/22: 83 kg (183 lb).    Weight management plan: Discussed healthy diet and exercise guidelines    She reports that she has been smoking cigarettes. She has a 15.00 pack-year smoking history. She has never used smokeless tobacco.  Tobacco Cessation Action Plan:   Information offered: Patient not interested at this time      Counseling Resources:  ATP IV Guidelines  Pooled Cohorts Equation Calculator  Breast Cancer Risk Calculator  BRCA-Related Cancer Risk Assessment: FHS-7 Tool  FRAX Risk Assessment  ICSI Preventive Guidelines  Dietary Guidelines for Americans, 2010  USDA's MyPlate  ASA Prophylaxis  Lung " CA Screening    XIN Sanders CNP [facility]

## 2022-05-05 NOTE — PATIENT INSTRUCTIONS
--I want you to call to get ADHD testing done so that we can treat you for ADHD if you have it  -I would like you to take your blood pressure medicine everyday  -I want you to get 8 hours of sleep every night  -Only 1-2 cups caffeine before noon  - Try to do at least two protein shakes per day in addition to the meal that you eat. Examples:weight watchers; atkins shakes (usually 200-300 calories)  -Boosting metabolism- lifting weights  -Try to avoid sugary stuff as much as possible- like red bull or energy drinks; crystal light is okay    Preventive Health Recommendations  Female Ages 40 to 49    Yearly exam:   See your health care provider every year in order to  Review health changes.   Discuss preventive care.    Review your medicines if your doctor prescribed any.    Get a Pap test every three years (unless you have an abnormal result and your provider advises testing more often).    If you get Pap tests with HPV test, you only need to test every 5 years, unless you have an abnormal result. You do not need a Pap test if your uterus was removed (hysterectomy) and you have not had cancer.    You should be tested each year for STDs (sexually transmitted diseases), if you're at risk.   Ask your doctor if you should have a mammogram.    Have a colonoscopy (test for colon cancer) if someone in your family has had colon cancer or polyps before age 50.     Have a cholesterol test every 5 years.     Have a diabetes test (fasting glucose) after age 45. If you are at risk for diabetes, you should have this test every 3 years.    Shots: Get a flu shot each year. Get a tetanus shot every 10 years.     Nutrition:   Eat at least 5 servings of fruits and vegetables each day.  Eat whole-grain bread, whole-wheat pasta and brown rice instead of white grains and rice.  Get adequate Calcium and Vitamin D.      Lifestyle  Exercise at least 150 minutes a week (an average of 30 minutes a day, 5 days a week). This will help you control  your weight and prevent disease.  Limit alcohol to one drink per day.  No smoking.   Wear sunscreen to prevent skin cancer.  See your dentist every six months for an exam and cleaning.

## 2022-05-17 ENCOUNTER — HOSPITAL ENCOUNTER (OUTPATIENT)
Facility: AMBULATORY SURGERY CENTER | Age: 42
End: 2022-05-17
Attending: INTERNAL MEDICINE
Payer: COMMERCIAL

## 2022-05-17 ENCOUNTER — TELEPHONE (OUTPATIENT)
Dept: GASTROENTEROLOGY | Facility: CLINIC | Age: 42
End: 2022-05-17
Payer: COMMERCIAL

## 2022-05-17 NOTE — TELEPHONE ENCOUNTER
Screening Questions  BlueKIND OF PREP RedLOCATION [review exclusion criteria] GreenSEDATION TYPE  1. Have you had a positive covid test in the last 90 days? n     2. Do you have a legal guardian or medical Power of ?  Are you able to give consent for your medical care?y (Sedation review/consideration needed)    3. Are you active on mychart? Y    4. What insurance is in the chart? [employer]     3.   Ordering/Referring Provider: Danna Enrique APRN CNP in  PRIMARY CARE    4. BMI 32.7 [BMI OVER 40-EXTENDED PREP]  If greater than 40 review exclusion criteria [PAC APPT IF @ [facility]]        5.  Respiratory Screening :  [If yes to any of the following HOSPITAL setting only]     Do you use daily home oxygen? N    Do you have mod to severe Obstructive Sleep Apnea? N  [OKAY @ [facility] SH PH RI]   Do you have Pulmonary Hypertension? N   Do you have UNCONTROLLED asthma? N        6.   Have you had a heart or lung transplant? N      7.   Are you currently on dialysis? N [ If yes, G-PREP & HOSPITAL setting only]     8.   Do you have chronic kidney disease? N [ If yes, G-PREP ]    9.   Have you had a stroke or Transient ischemic attack (TIA - aka  mini stroke ) within 6 months?  N (If yes, please review exclusion criteria)    10.   In the past 6 months, have you had any heart related issues including cardiomyopathy or heart attack? N           If yes, did it require cardiac stenting or other implantable device? N      11.   Do you have any implantable devices in your body (pacemaker, defib, LVAD)? N (If yes, please review exclusion criteria)    12.   Do you take nitroglycerin? N           If yes, how often? N  (if yes, HOSPITAL setting ONLY)    13.   Are you currently taking any blood thinners? N           [IF YES, INFORM PATIENT TO FOLLOW UP W/ ORDERING PROVIDER FOR BRIDGING INSTRUCTIONS]     14.   Do you have a diagnosis of diabetes? N [ If yes, G-PREP ]    15.   [FEMALES] Are you currently pregnant? N    If yes,  how many weeks? N    16.   Are you taking any prescription pain medications on a routine schedule?  N  [ If yes, EXTENDED PREP.] [If yes, MAC]    17.   Do you have any chemical dependencies such as alcohol, street drugs, or methadone?  N [If yes, MAC]    18.   Do you have any history of post-traumatic stress syndrome, severe anxiety or history of psychosis?  N  [If yes, MAC]    19.   Do you transfer independently?  Y    20.  On a regular basis do you go 3-5 days between bowel movements? Y [ If yes, EXTENDED PREP.]    21.   Preferred LOCAL Pharmacy for Pre Prescription     [id number] - [place], [place] - [address] AT [facility] [address]    Scheduling Details    Caller : Elizabeth  (Please ask for phone number if not scheduled by patient)    Type of Procedure Scheduled: colonoscopy  Which Colonoscopy Prep was Sent?: extended  KHORUTS CF PATIENTS & GROEN'S PATIENTS NEEDS EXTENDED PREP  Surgeon: Santosh  Date of [place]: 7/11  [place] [facility] [place] Type: CS  Conscious Sedation- Needs  for 6 hours after the procedure  MAC/General-Needs  for 24 hours after procedure    Pre-op Required at [facility] [place], [place] and OR for MAC sedation: n  (advise patient they will need a pre-op prior to procedure -)      Informed patient they will need an adult  y  Cannot take any type of public or medical transportation alone    [facility]Pro[facility] or Externally: [place]    Confirmed Nurse will call to complete assessment y    Additional comments: none

## 2022-06-20 ENCOUNTER — THERAPY VISIT (OUTPATIENT)
Dept: OCCUPATIONAL THERAPY | Facility: CLINIC | Age: 42
End: 2022-06-20
Payer: COMMERCIAL

## 2022-06-20 DIAGNOSIS — G56.01 CARPAL TUNNEL SYNDROME OF RIGHT WRIST: ICD-10-CM

## 2022-06-20 DIAGNOSIS — M79.645 PAIN OF LEFT THUMB: ICD-10-CM

## 2022-06-20 DIAGNOSIS — R20.2 PARESTHESIAS: ICD-10-CM

## 2022-06-20 DIAGNOSIS — M79.641 PAIN OF RIGHT HAND: ICD-10-CM

## 2022-06-20 DIAGNOSIS — M79.642 PAIN OF LEFT HAND: ICD-10-CM

## 2022-06-20 PROCEDURE — 97165 OT EVAL LOW COMPLEX 30 MIN: CPT | Mod: GO | Performed by: OCCUPATIONAL THERAPIST

## 2022-06-20 PROCEDURE — 97760 ORTHOTIC MGMT&TRAING 1ST ENC: CPT | Mod: GO | Performed by: OCCUPATIONAL THERAPIST

## 2022-06-20 PROCEDURE — 97110 THERAPEUTIC EXERCISES: CPT | Mod: GO | Performed by: OCCUPATIONAL THERAPIST

## 2022-06-20 NOTE — PROGRESS NOTES
"Hand Therapy Initial Evaluation  Current Date:  6/20/2021     Subjective:  Elizabeth Gil is a 42 year old R hand dominant female.     Diagnosis: R and L CTS  DOI:  9/13/21 MD order, noticing L sided for ~ 1 month, R over 1 year      Patient reports symptoms of pain, stiffness/loss of motion, weakness/loss of strength, edema, numbness and tingling of the R and L hand which occurred due to unknown cause. Since onset symptoms are unchanged. Special tests:  none.  Previous treatment: R OTC orthosis, R custom orthosis (Seeing hand therapy x1 in Sept 2021 for R CTS). General health as reported by patient is fair.  Pertinent medical history includes: High Blood Pressure.  Medical allergies: none.  Surgical history: none.  Medication history: High Blood Pressure.     Occupational Profile Information:  Current occupation is Janitorial Service, owns cleaning company   Currently working in normal job without restrictions  Job Tasks: Computer Work, Prolonged Standing, Pushing, Pulling, Repetitive Tasks  Prior functional level:  independent-shared household chores  Barriers include:none  Mobility: No difficulty  Transportation: drives  Leisure activities/hobbies: roller skate, 3 kids, travel  Other: Has R custom brace. B hands/all digits get \"stuck\" in extension.     Functional Outcome Measure:   TBD    Objective:  Pain Level (Scale 0-10):   6/20/2022   At Rest R: 0  L: 3   With Use R:0 (Sensation issues)  L:8     Pain Description:  Date 6/20/2022   Location R volar wrist   L thenar, volar thumb base    Pain Quality Penetrating, Sharp, Throbbing and Tingling   Frequency constant     Pain is worst  daytime or nighttime   Exacerbated by  cleaning, repetitive tasks.    Relieved by cold and otc medications, rest    Progression Worse      Posture  Normal    Edema  Moderate in L thenar     Sensation  Impaired in al digits.     ROM  Pain Report: - none  + mild    ++ moderate    +++ severe   Wrist 6/20/2022 6/20/2022   AROM (PROM) R " L   Extension 75 75   Flexion 70 75   RD 15 20   UD 40 50     B FA pro/sup WFL    Special Tests  Pain Report:  - none    + mild    ++ moderate    +++ severe    6/20/2022     R L   Median Nerve Compression at Pronator NT NT   Carpal Compression Test--Durkan Test (30 sec) NT NT   Mcgill Test for Lumbrical Incursion  (fist x 30 secs) NT NT   Tinels at Carpal Tunnel - -   Phalens + 20 sec +25 sec    Elbow flexion test  - -     Neural Tension Testing  MNT: Median Neurodynamic Test (based on MADYSON Alvarez's ULNT)   6/20/2022   0-5 Scale NT   Position:   0/5: Arm across abdomen in coronal plane  1/5: Depress shoulder, ER to neutral ABD shoulder to 45 degrees  2/5: ER shoulder to end range, keep elbow at 90 degrees  3/5: Extend elbow to 0 degrees  4/5: Fully supinate forearm  5/5: Extend wrist, fingers and thumb  Notes:    (+) indicates beyond grade level but less than detention to next level    (-) indicates over detention to level    S1  onset/change of patient's symptoms    S2 definite stop point based on patient's discomfort level    Strength   (Measured in pounds)  NT d/t pain and time restrictions.     Palpation   Pain Report:  - none    + mild    ++ moderate    +++ severe    6/20/2022   L Thenar/CMC  ++     Assessment:  Patient presents with symptoms consistent with diagnosis noted above with symptoms also consistent with L thumb CMC pain, with conservative intervention.     Patient's limitations or Problem List includes:  Pain, Decreased ROM/motion, Increased edema, Weakness, Sensory disturbance, Hypomobility, Decreased , Decreased pinch, Decreased coordination and Decreased dexterity of the left wrist, hand, thumb, index finger, long finger, ring finger and small finger which interferes with the patient's ability to perform Self Care Tasks (dressing, bathing), Work Tasks, Sleep Patterns, Recreational Activities, Household Chores and Driving  as compared to previous level of function.    Rehab Potential:  Excellent -  Return to full activity, no limitations    Patient will benefit from skilled Occupational Therapy to increase ROM, flexibility, motion, overall strength,  strength, pinch strength, stability of wrist, stability of thumb, coordination, dexterity and sensation and decrease pain and edema to return to previous activity level and resume normal daily tasks and to reach their rehab potential.    Barriers to Learning:  No barrier    Communication Issues:  Patient appears to be able to clearly communicate and understand verbal and written communication and follow directions correctly.    Chart Review: Chart Review and Simple history review with patient    Identified Performance Deficits: bathing/showering, dressing, functional mobility, driving and community mobility, health management and maintenance, home establishment and management, meal preparation and cleanup, shopping, sleep, work and leisure activities    Assessment of Occupational Performance:  5 or more Performance Deficits    Clinical Decision Making (Complexity): Low complexity    Treatment Explanation:  The following has been discussed with the patient:  RX ordered/plan of care  Anticipated outcomes  Possible risks and side effects    Plan:  Frequency:  1 X week, once daily  Duration:  for 10 weeks    Treatment Plan:    Modalities:    US, Iontophoresis, Fluidotherapy and Paraffin   Therapeutic Exercise:    AROM, AAROM, PROM, Tendon Gliding, Blocking, Reverse Blocking, Extensor Tracking, Isotonics, Isometrics and Stabilization  Neuromuscular re-ed:   Nerve Gliding, Coordination/Dexterity, Sensory re-education, Posture, Kinesiotaping, Isometrics and Stabilization  Manual Techniques:   Coordination/Dexterity, Joint mobilization, Friction massage, Myofascial release and Manual edema mobilization  Orthotic Fabrication:    Static, Static progressive and Dynamic  Self Care:    Self Care Tasks, Ergonomic Considerations, Community Transportation and Work  Tasks    Discharge Plan:  Achieve all LTG.  Independent in home treatment program.  Reach maximal therapeutic benefit.    Home Exercise Program:  L FA thumb spica     PTRX Report  The following values have been sent to [facility].  Finger Active Range of Motion Tendon Glides Fist Series  EMR Notes  HEP - Sets 1  Reps 10 reps  Sessions per day 3-4  Notes Keep wrist straight Hold end position for 5 seconds  Finger Active Range of Motion FDS Flexor Tendon Gliding  EMR Notes  HEP - Sets 1  Reps 10 reps  Sessions per day 3-4  Notes Middle Joint Hold 5 seconds  Nerve Gliding Proximal Median  EMR Notes  HEP - Sets 1  Reps 10  Sessions per day 2  Notes Hold 5 sec Stop Video at 18 seconds  Thumb Active Range of Motion Composite Flexion  EMR Notes  HEP - Sets  Reps 5-10  Sessions per day 2-3  Notes Left only.  Thumb Active Range of Motion Palmar Abduction  EMR Notes  HEP - Sets  Reps 5-10  Sessions per day 2-3  Notes Left only.      Next Visit:  Formal sensation testing   UEFI  Assess HEP  Assess L orthosis   Assess after sports med visit 6/28/22   Massage   US   Neural testing   Assess strength

## 2022-06-20 NOTE — TELEPHONE ENCOUNTER
DIAGNOSIS: Bilat knee pain , per pt, xrays from 5/2022 in chart   APPOINTMENT DATE: 6.28.22   NOTES STATUS DETAILS   DISCHARGE REPORT from the ER Internal 5.2.22 [facility] ED   MEDICATION LIST Internal    MRI Internal 9.23.20 R knee   XRAYS (IMAGES & REPORTS) Internal 5.2.22 B knee  9.10.20 R knee

## 2022-06-22 NOTE — PROGRESS NOTES
[facility]    OUTPATIENT Occupational Therapy ORTHOPEDIC EVALUATION  PLAN OF TREATMENT FOR OUTPATIENT REHABILITATION  (COMPLETE FOR INITIAL CLAIMS ONLY)  Patient's Last Name, First Name, M.I.  YOB: 1980  Elizabeth Gil    Provider s Name:  [facility]   Medical Record No.  [id number]   Start of Care Date:  06/20/22   Onset Date:   09/13/22 (MD order)   Type:     __PT   __x_OT Medical Diagnosis:    Encounter Diagnoses   Name Primary?    Carpal tunnel syndrome of right wrist     Pain of left thumb     Pain of right hand     Pain of left hand     Paresthesias         Treatment Diagnosis:  R and L CTS        Goals:     06/20/22 0500   Goal #1   Goal #1 household chores   Previous Performance Level Independent   Current Functional Task    Current Performance Level 8/10 pain   STG Target Perfomance Open a tight or new jar   STG Target Perform Level 6/10 pain   Due Date 07/19/22   LTG Target Task/Performance Pain free household chores   Due Date 08/18/22       Therapy Frequency:  1x per week  Predicted Duration of Therapy Intervention:  10 weeks    CELINE CONTE OT                 I CERTIFY THE NEED FOR THESE SERVICES FURNISHED UNDER        THIS PLAN OF TREATMENT AND WHILE UNDER MY CARE     (Physician attestation of this document indicates review and certification of the therapy plan).                     Certification Date From:  06/20/22   Certification Date To:  08/29/22    Referring Provider:  Vipin Graham    Initial Assessment        See Epic Evaluation SOC Date: 06/20/22

## 2022-06-28 ENCOUNTER — PRE VISIT (OUTPATIENT)
Dept: ORTHOPEDICS | Facility: CLINIC | Age: 42
End: 2022-06-28
Payer: COMMERCIAL

## 2022-07-06 ENCOUNTER — TELEPHONE (OUTPATIENT)
Dept: GASTROENTEROLOGY | Facility: CLINIC | Age: 42
End: 2022-07-06

## 2022-07-06 DIAGNOSIS — Z12.11 ENCOUNTER FOR SCREENING COLONOSCOPY: Primary | ICD-10-CM

## 2022-07-06 RX ORDER — BISACODYL 5 MG/1
TABLET, DELAYED RELEASE ORAL
Qty: 2 TABLET | Refills: 0 | Status: SHIPPED | OUTPATIENT
Start: 2022-07-06

## 2022-07-06 NOTE — TELEPHONE ENCOUNTER
Pre assessment questions completed for upcoming colonoscopy procedure scheduled on 7.11.2022    Discussed at home rapid antigen COVID test 1-2 days prior to procedure.    Reviewed procedural arrival time 1345 and facility location [facility].    Designated  policy reviewed. Instructed to have someone stay 24 hours post procedure.     Anticoagulation/blood thinners? no    Electronic implanted devices? No    Phentermine? No    Reviewed extended prep instructions with patient. No fiber/iron supplements or foods that contain nuts/seeds prior to procedure.     Prep instructions sent via [employer].  Prep prescription sent to [id number] - [place], [place] - [address] AT [facility] [address]    Patient verbalized understanding and had no questions or concerns at this time.    Mili Hough RN

## 2022-07-09 RX ORDER — LIDOCAINE 40 MG/G
CREAM TOPICAL
Status: CANCELLED | OUTPATIENT
Start: 2022-07-09

## 2022-07-09 RX ORDER — ONDANSETRON 2 MG/ML
4 INJECTION INTRAMUSCULAR; INTRAVENOUS
Status: CANCELLED | OUTPATIENT
Start: 2022-07-09

## 2022-07-11 ENCOUNTER — TELEPHONE (OUTPATIENT)
Dept: GASTROENTEROLOGY | Facility: CLINIC | Age: 42
End: 2022-07-11

## 2022-07-11 NOTE — TELEPHONE ENCOUNTER
Caller: Elizabeth Gil    Procedure: colon    Date, Location, and Surgeon of Procedure Cancelled: 7/11/22 [facility] [place]    Ordering Provider:Danna Enrique    Reason for cancel (please be detailed, any staff messages or encounters to note?): something came up/she wouldn't say what and will call back when she can look at her schedule again.           Rescheduled: n     If rescheduled:    Date:    Location:    Prep Resent: (changes to prep?)   Covid Test Rescheduled:    Note any change or update to original order/sedation:

## 2022-08-15 PROBLEM — M79.642 PAIN OF LEFT HAND: Status: RESOLVED | Noted: 2022-06-20 | Resolved: 2022-08-15

## 2022-08-15 PROBLEM — M79.645 PAIN OF LEFT THUMB: Status: RESOLVED | Noted: 2022-06-20 | Resolved: 2022-08-15

## 2022-08-15 PROBLEM — M79.641 PAIN OF RIGHT HAND: Status: RESOLVED | Noted: 2022-06-20 | Resolved: 2022-08-15

## 2022-08-15 PROBLEM — R20.2 PARESTHESIAS: Status: RESOLVED | Noted: 2022-06-20 | Resolved: 2022-08-15

## 2022-09-18 ENCOUNTER — HEALTH MAINTENANCE LETTER (OUTPATIENT)
Age: 42
End: 2022-09-18

## 2022-09-27 NOTE — PROGRESS NOTES
History of abnormal PAP and apparently was supposed to have a LEEP procedure done. Pt reported.  10/18/18: Ascus pap with + HR HPV (not 16 or 18) result. Plan [place] due bef 01/18/19. Pt was advised.  12/18/18 [facility] reminder letter sent. (es)  12/21/18 [facility]- PASTORA 2/3. Plan LEEP by 3/21/19. (LN)  12/28/18 Patient informed of results and recommendations. (LN)   03/13/19: LEEP scheduled--no show.  03/20/19 LEEP reminder letter sent. (es)  05/17/19 3mo LEEP not done, chart and tracking updated for 6mo LEEP/pap. ([facility])   6/13/19 NIL pap, + HR HPV (not 16 or 18). Plan [place] by 9/13/19. (LN)  6/24/19 Patient notified of result and recommendations. (LN)  08/13/19 [facility] reminder letter sent. (es)  09/13/19 3mo colp not done, chart and tracking updated for 6mo colp/pap. (es)  12/19/19 Madison/pap reminder letter sent. ([facility])  01/16/20 Spoke with pt, states she will call to schedule. ([facility])  03/17/20 Patient is lost to pap tracking follow-up. SARAH routed to provider. ([facility])     Negative

## 2023-01-11 NOTE — TELEPHONE ENCOUNTER
" Patient ID: Blossom Kumar is a 69 y.o. female.    Chief Complaint: Annual Exam      Reviewed family, medical, surgical, and social history.    No cp/sob  No change in mental status  No fever  No asymmetrical limb swelling    Objective:      /84 (BP Location: Right arm, Patient Position: Sitting, BP Method: Medium (Manual))   Pulse 70   Resp 15   Ht 5' 5" (1.651 m)   Wt 79.6 kg (175 lb 8 oz)   SpO2 98%   BMI 29.20 kg/m²   RRR, CTAB, s/nt/nd, no c/c/e, non-toxic appearing, no focal weakness  Assessment:       1. Annual physical exam    2. Need for vaccination        Plan:       Annual physical exam  -     CBC Auto Differential; Future; Expected date: 01/11/2023  -     Comprehensive Metabolic Panel; Future; Expected date: 01/11/2023  -     Hemoglobin A1C; Future; Expected date: 01/11/2023  -     Lipid Panel; Future; Expected date: 01/11/2023  -     TSH; Future; Expected date: 01/11/2023  -     T4, Free; Future; Expected date: 01/11/2023    Need for vaccination  -     Influenza (FLUAD) - Quadrivalent (Adjuvanted) *Preferred* (65+) (PF)              Continue current medicines, any changes noted above  Doing well  Check annual labs  Labs, radiology studies, and procedures/notes from the last 3 months were reviewed.    Risks, benefits, and side effects were discussed with the patient. All questions were answered to the fullest satisfaction of the patient, and pt verbalized understanding and agreement to treatment plan. Pt was to call with any new or worsening symptoms, or present to the ER.    " 10/23/2020, 3:09 PM    Spoke with patient. R/s appointment from 12/16 to 10/27/20 at 0930h. The patient voiced agreement and understanding of date, time, and place of appointment.      REYNALDO DallasT

## 2023-05-03 DIAGNOSIS — I10 HYPERTENSION, UNSPECIFIED TYPE: ICD-10-CM

## 2023-05-03 RX ORDER — HYDROCHLOROTHIAZIDE 25 MG/1
TABLET ORAL
Qty: 90 TABLET | Refills: 3 | Status: SHIPPED | OUTPATIENT
Start: 2023-05-03

## 2023-05-03 NOTE — TELEPHONE ENCOUNTER
"Requested Prescriptions   Pending Prescriptions Disp Refills     hydrochlorothiazide (HYDRODIURIL) 25 MG tablet [Pharmacy Med Name: HYDROCHLOROTHIAZIDE 25MG TABLETS] 90 tablet 3     Sig: TAKE 1 TABLET(25 MG) BY MOUTH DAILY       Diuretics (Including Combos) Protocol Failed - 5/3/2023  9:13 AM        Failed - Blood pressure under 140/90 in past 12 months     BP Readings from Last 3 Encounters:   05/05/22 (!) 160/105   05/02/22 (!) 174/90   02/19/22 (!) 140/88                 Failed - Normal serum creatinine on file in past 12 months     Recent Labs   Lab Test 02/04/22  1439   CR 0.62              Failed - Normal serum potassium on file in past 12 months     Recent Labs   Lab Test 02/04/22  1439   POTASSIUM 4.0                    Failed - Normal serum sodium on file in past 12 months     Recent Labs   Lab Test 02/04/22  1439                 Passed - Recent (12 mo) or future (30 days) visit within the authorizing provider's specialty     Patient has had an office visit with the authorizing provider or a provider within the authorizing providers department within the previous 12 mos or has a future within next 30 days. See \"Patient Info\" tab in inbasket, or \"Choose Columns\" in Meds & Orders section of the refill encounter.              Passed - Medication is active on med list        Passed - Patient is age 18 or older        Passed - No active pregancy on record        Passed - No positive pregnancy test in past 12 months         Routing refill request to provider for review/approval because medication did not pass protocol.    Pt was last seen on 05/05/22      Melissa Wong RN  [facility]   "

## 2023-07-30 ENCOUNTER — HEALTH MAINTENANCE LETTER (OUTPATIENT)
Age: 43
End: 2023-07-30

## 2023-12-22 ENCOUNTER — OFFICE VISIT (OUTPATIENT)
Dept: URGENT CARE | Facility: URGENT CARE | Age: 43
End: 2023-12-22
Payer: COMMERCIAL

## 2023-12-22 VITALS
RESPIRATION RATE: 14 BRPM | WEIGHT: 188 LBS | BODY MASS INDEX: 32.52 KG/M2 | HEART RATE: 104 BPM | DIASTOLIC BLOOD PRESSURE: 82 MMHG | SYSTOLIC BLOOD PRESSURE: 130 MMHG | TEMPERATURE: 99.7 F | OXYGEN SATURATION: 97 %

## 2023-12-22 DIAGNOSIS — R05.1 ACUTE COUGH: ICD-10-CM

## 2023-12-22 DIAGNOSIS — L30.9 ECZEMA, UNSPECIFIED TYPE: ICD-10-CM

## 2023-12-22 DIAGNOSIS — R07.0 THROAT PAIN: ICD-10-CM

## 2023-12-22 DIAGNOSIS — B37.31 YEAST INFECTION OF THE VAGINA: ICD-10-CM

## 2023-12-22 DIAGNOSIS — J02.0 STREP THROAT: Primary | ICD-10-CM

## 2023-12-22 DIAGNOSIS — J06.9 VIRAL URI WITH COUGH: ICD-10-CM

## 2023-12-22 LAB — DEPRECATED S PYO AG THROAT QL EIA: POSITIVE

## 2023-12-22 PROCEDURE — 87635 SARS-COV-2 COVID-19 AMP PRB: CPT | Performed by: PHYSICIAN ASSISTANT

## 2023-12-22 PROCEDURE — 87880 STREP A ASSAY W/OPTIC: CPT

## 2023-12-22 PROCEDURE — 99213 OFFICE O/P EST LOW 20 MIN: CPT | Performed by: PHYSICIAN ASSISTANT

## 2023-12-22 RX ORDER — AMOXICILLIN 500 MG/1
500 CAPSULE ORAL 2 TIMES DAILY
Qty: 20 CAPSULE | Refills: 0 | Status: SHIPPED | OUTPATIENT
Start: 2023-12-22 | End: 2024-01-01

## 2023-12-22 RX ORDER — ACETAMINOPHEN 500 MG
500-1000 TABLET ORAL EVERY 8 HOURS PRN
Qty: 60 TABLET | Refills: 0 | Status: SHIPPED | OUTPATIENT
Start: 2023-12-22

## 2023-12-22 RX ORDER — FLUCONAZOLE 150 MG/1
150 TABLET ORAL ONCE
Qty: 1 TABLET | Refills: 0 | Status: SHIPPED | OUTPATIENT
Start: 2023-12-22 | End: 2023-12-22

## 2023-12-22 RX ORDER — ACETAMINOPHEN 500 MG
1000 TABLET ORAL ONCE
Status: COMPLETED | OUTPATIENT
Start: 2023-12-22 | End: 2023-12-22

## 2023-12-22 RX ADMIN — Medication 1000 MG: at 15:28

## 2023-12-22 NOTE — PATIENT INSTRUCTIONS
December 22, 2023 Urgent Care Plan     You were diagnosed with both a viral upper respiratory infection and strep throat here today.          - Amoxicillin antibiotic for Strep   -Home supportive care   -Ok to take over the counter doses of Tylenol over the next couple of days, as needed for sore throat and fever. Do no take more than 3,000 mg of Tylenol in 24 hours.   -Encourage extra fluids   -Follow-up with primary care or urgent care if no improvement after 3-4 days of antibiotics, if not fully resolved in 10 days, and sooner if worsening.   -Change toothbrush as discussed to prevent re-infection     -Watch your MyChart for your pending COVID PCR test result    -At your request, I also provided a prescription for Diflucan.  Please fill this medication if you develop yeast infection symptoms after taking the amoxicillin antibiotic.

## 2023-12-22 NOTE — PROGRESS NOTES
ASSESSMENT/PLAN:         MDM: Dual illness-acute onset upper respiratory symptoms consistent with viral URI. Rapid Strep is positive. Covid PCR test result pending. No evidence of respiratory distress or other medical distress requiring emergent intervention at this time.      Plan:    December 22, 2023 Urgent Care Plan     You were diagnosed with both a viral upper respiratory infection and strep throat here today.          - Amoxicillin antibiotic for Strep   -Home supportive care   -Ok to take over the counter doses of Tylenol over the next couple of days, as needed for sore throat and fever. Do no take more than 3,000 mg of Tylenol in 24 hours.   -Encourage extra fluids   -Follow-up with primary care or urgent care if no improvement after 3-4 days of antibiotics, if not fully resolved in 10 days, and sooner if worsening.   -Change toothbrush as discussed to prevent re-infection     -Watch your MyChart for your pending COVID PCR test result    -At your request, I also provided a prescription for Diflucan.  Please fill this medication if you develop yeast infection symptoms after taking the amoxicillin antibiotic.      (J02.0) Strep throat  (primary encounter diagnosis)  Plan: amoxicillin (AMOXIL) 500 MG capsule            (J06.9) Viral URI with cough      (B37.31) Yeast infection of the vagina  Plan: fluconazole (DIFLUCAN) 150 MG tablet            (R07.0) Throat pain  Plan: Streptococcus A Rapid Screen w/Reflex to PCR -         Clinic Collect, acetaminophen (TYLENOL) tablet         1,000 mg, acetaminophen (TYLENOL) 500 MG tablet            (R05.1) Acute coug  Plan: Symptomatic COVID-19 Virus (Coronavirus) by PCR        Nose            (L30.9) Eczema, unspecified type  Plan: Emollient (AQUAPHOR ADVANCED THERAPY) OINT          This progress note has been dictated, with use of voice recognition software. Any grammatical, typographical, or context errors are unintentional and inherent to use of voice recognition  software.  -----------------       SUBJECTIVE:     Elizabeth Gil 43 year old female who presents to  today for evaluation of nasal congestion, cough, acute onset sore throat, and subjective fever symptoms x  4 days duration. Patient confirms she is still able to take in good fluids and soft food despite sore throat.     While she is here today, patient is also requesting a refill of over-the-counter dose Tylenol and Aquaphor for dry itchy skin on her legs.      Illness Contact: no known specific illness exposure           ROS: No associated severe coughing up of blood, blue lips/fingers/toes, shortness of breath, chest pain, wheezing, abdominal pain, nausea, vomiting, diarrhea, severe body aches, severe headaches, rashes, joint swelling or other acute illness symptoms.           Past Medical History:   Diagnosis Date    Abnormal Pap smear of cervix 10/18/2018    10/18/18: See problem list.     Cervical high risk HPV (human papillomavirus) test positive 10/18/2018, 6/12/19    See problem list    Complex cyst of left ovary     Female stress incontinence     Hypertension     Migraine        Patient Active Problem List   Diagnosis    PASTORA III (cervical intraepithelial neoplasia III)    Female stress incontinence    Social anxiety disorder    Other migraine without status migrainosus, intractable    Iron deficiency    Eczema, unspecified type    Class 1 obesity due to excess calories with body mass index (BMI) of 34.0 to 34.9 in adult, unspecified whether serious comorbidity present    Hypertension    Abdominal pain, right lower quadrant    Abdominal pain, left lower quadrant    Adnexal mass    Abnormal Pap smear of cervix         Current Outpatient Medications   Medication    acetaminophen (TYLENOL) 325 MG tablet    acetaminophen (TYLENOL) 325 MG tablet    bisacodyl (DULCOLAX) 5 MG EC tablet    Emollient (AQUAPHOR ADVANCED THERAPY) OINT    hydrochlorothiazide (HYDRODIURIL) 25 MG tablet    ibuprofen (ADVIL/MOTRIN)  800 MG tablet    magnesium citrate solution    Multiple Vitamin (MULTIVITAMIN ADULT) TABS    phentermine (ADIPEX-P) 37.5 MG tablet    polyethylene glycol (GOLYTELY) 236 g suspension    senna-docusate (SENOKOT-S/PERICOLACE) 8.6-50 MG tablet    UNABLE TO FIND    verapamil ER (CALAN-SR) 180 MG CR tablet    vitamin D3 (CHOLECALCIFEROL) 2000 units (50 mcg) tablet     No current facility-administered medications for this visit.     Allergies   Allergen Reactions    Morphine Itching               OBJECTIVE:  /82 (BP Location: Right arm, Patient Position: Chair, Cuff Size: Adult Regular)   Pulse 104   Temp 99.7  F (37.6  C) (Oral)   Resp 14   Wt 85.3 kg (188 lb)   LMP 12/15/2023 (Approximate)   SpO2 97%   BMI 32.52 kg/m            General appearance: alert and no apparent distress  Skin color is uniform in color and without rash.  HEENT:   Conjunctiva not injected.  Sclera clear.  Left TM is normal: no effusions, no erythema, and normal landmarks.  Right TM is normal: no effusions, no erythema, and normal landmarks.  Nasal mucosa is normal.  Oropharyngeal exam is positive for generalized, moderate, posterior pharyngeal erythema and bilateral exudates.  No asymmetry. Uvula is midline. No trismus. Voice is clear. No lesions, adenopathy, plaque or exudate.  Neck is supple, FROM. No neck stiffness. No adenopathy  CARDIAC:NORMAL - regular rate and rhythm without murmur.  RESP: No increased work of breathing.  Patient is able to speak full sentences without pause.  . Lung fields are clear to ausculation. No rales, rhonchi, or wheezing.  NEURO: Alert and oriented.  Normal speech and mentation.  CN II/XII grossly intact.  Gait within normal limits.          LAB:      Results for orders placed or performed in visit on 12/22/23   Streptococcus A Rapid Screen w/Reflex to PCR - Clinic Collect     Status: Abnormal    Specimen: Throat; Swab   Result Value Ref Range    Group A Strep antigen Positive (A) Negative        Covid  PCR Test Result Pending

## 2023-12-23 LAB — SARS-COV-2 RNA RESP QL NAA+PROBE: NEGATIVE

## 2024-01-22 NOTE — TELEPHONE ENCOUNTER
"[facility] refill x1, appt 12/11  Requested Prescriptions   Signed Prescriptions Disp Refills    hydrochlorothiazide (HYDRODIURIL) 25 MG tablet 90 tablet 1     Sig: TAKE 1 TABLET(25 MG) BY MOUTH DAILY       Diuretics (Including Combos) Protocol Failed - 11/9/2021  5:50 AM        Failed - Blood pressure under 140/90 in past 12 months     BP Readings from Last 3 Encounters:   10/26/21 (!) 150/100   10/17/21 125/61   10/16/21 (!) 159/104                 Failed - Recent (12 mo) or future (30 days) visit within the authorizing provider's specialty     Patient has had an office visit with the authorizing provider or a provider within the authorizing providers department within the previous 12 mos or has a future within next 30 days. See \"Patient Info\" tab in inbasket, or \"Choose Columns\" in Meds & Orders section of the refill encounter.              Passed - Medication is active on med list        Passed - Patient is age 18 or older        Passed - No active pregancy on record        Passed - Normal serum creatinine on file in past 12 months     Recent Labs   Lab Test 10/16/21  1718   CR 0.65              Passed - Normal serum potassium on file in past 12 months     Recent Labs   Lab Test 10/16/21  1718   POTASSIUM 3.8                    Passed - Normal serum sodium on file in past 12 months     Recent Labs   Lab Test 10/16/21  1718                 Passed - No positive pregnancy test in past 12 months           Demetra Kim RN  [facility]     "
no

## 2024-02-21 ENCOUNTER — OFFICE VISIT (OUTPATIENT)
Dept: SURGERY | Facility: CLINIC | Age: 44
End: 2024-02-21

## 2024-02-21 ENCOUNTER — ALLIED HEALTH/NURSE VISIT (OUTPATIENT)
Dept: SURGERY | Facility: CLINIC | Age: 44
End: 2024-02-21

## 2024-02-21 VITALS
OXYGEN SATURATION: 98 % | SYSTOLIC BLOOD PRESSURE: 160 MMHG | BODY MASS INDEX: 33.2 KG/M2 | HEART RATE: 95 BPM | HEIGHT: 63 IN | WEIGHT: 187.4 LBS | DIASTOLIC BLOOD PRESSURE: 98 MMHG

## 2024-02-21 VITALS — HEIGHT: 63 IN | WEIGHT: 187.4 LBS | BODY MASS INDEX: 33.2 KG/M2

## 2024-02-21 DIAGNOSIS — G43.819 OTHER MIGRAINE WITHOUT STATUS MIGRAINOSUS, INTRACTABLE: ICD-10-CM

## 2024-02-21 DIAGNOSIS — E66.811 CLASS 1 OBESITY DUE TO EXCESS CALORIES WITH SERIOUS COMORBIDITY AND BODY MASS INDEX (BMI) OF 32.0 TO 32.9 IN ADULT: Primary | ICD-10-CM

## 2024-02-21 DIAGNOSIS — E66.09 CLASS 1 OBESITY DUE TO EXCESS CALORIES WITH SERIOUS COMORBIDITY AND BODY MASS INDEX (BMI) OF 32.0 TO 32.9 IN ADULT: Primary | ICD-10-CM

## 2024-02-21 PROCEDURE — 99204 OFFICE O/P NEW MOD 45 MIN: CPT | Performed by: PHYSICIAN ASSISTANT

## 2024-02-21 PROCEDURE — 97802 MEDICAL NUTRITION INDIV IN: CPT

## 2024-02-21 RX ORDER — SEMAGLUTIDE 0.25 MG/.5ML
0.25 INJECTION, SOLUTION SUBCUTANEOUS
Qty: 2 ML | Refills: 0 | Status: SHIPPED | OUTPATIENT
Start: 2024-02-21

## 2024-02-21 RX ORDER — SEMAGLUTIDE 0.5 MG/.5ML
0.5 INJECTION, SOLUTION SUBCUTANEOUS
Qty: 2 ML | Refills: 1 | Status: SHIPPED | OUTPATIENT
Start: 2024-02-21

## 2024-02-21 NOTE — PROGRESS NOTES
MEDICAL WEIGHT LOSS INITIAL EVALUATION  DIAGNOSIS:  Obesity Class I    NUTRITION HISTORY:  Diet and exercise history per provider visit as follows:      Was seen in 2020. Working 50+ hours at [employer] and has high stress level. No eating schedule. Will get home from work tired and will sometimes stress eat.     Diet Recall      Wake Up: Can wake up at noon as works late.        Breakfast     Lunch [employer] burgers and fries at 1 pm with coke   Supper; 10 pm tuna with eggs    Bedtime:   Snack between meals:   Snack in evening:                   Typical snacks:     Caloric beverages per day. What type:     Water consumed daily: Over 30 oz water daily, 3-4 cups of coffee, 3 cups of lemonade. [place]. 3-4 servings of alcohol weekly   Low yonas/diet drinks:   Alcohol:   Foods you crave:                      Sleep History     How many hours do you sleep at night? 5-6 hours    Do you think you have sleep apnea?      Do you snore so loudly some one can hear you through a closed door?     Has anyone seen or heard you stop breathing during your sleep?      Do you often feel tired, fatigued, or sleepy during the day? yes   Do you have a TV/Computer in your bedroom?            Eating Habits (Yes/No) (Never, Daily, Several Days, Weekly,Monthly)   Healthy Eater     Generally, eat a lot of Simple Carbs/processed boxed foods     Generally, eat a lot of fatty foods     Eat fast food  Several times a week      Eat Take out/sit-down restaurant. 5-6 times daily       Eat most meals in front of screens     Skip meals     Grazes all day     Snacks between meals.     Eat most food at end of day.     Eat in middle of night      Eat  to prevent or correct low blood sugar.     Eat to prevent GERD     Worry about not having enough food to eat.     Constant Dieter     I emotionally eat. (stressed, depressed, anxious, bored)     I feel hungry all the time-even if I just have eaten.     Feeling full is important to me.     I finish all the food on  "my plate-even if I am already full.     I eat/snack without noticing that I am eating.     I eat when I am preparing the meal.     I have trouble not eating junk if they are around the house.     I think about food all day.     Who does the grocery shopping?     Who does the cooking?           Eating Disorder Screen     I binge eat Yes - usually ate chips and dips and sandwich. Several times weekly. Tired and stressed      I make myself vomit what I have eaten or use laxatives to get rid of food.     I eat a large amount of food, like a loaf of bread, a box of cookies, a pint/quart of ice cream, all at once.     I eat a large amount of food even when I am not hungry.     I eat rapidly.     I eat alone because I feel embarrassed and do not want others to see how much I have eaten.     I eat until I am uncomfortably full.     I feel bad, disgusted, or guilty after I overeat. yes           Additional Information: Pt with demanding work schedule and erratic meal patterns. Wegovy prescribed today. Started [facility] program in 2020 but never saw RD.     ANTHROPOMETRICS:  Height: 5' 3.25\"   Weight: 187 lbs 6.4 oz  BMI:  32.93 kg/m2  NUTRITION DIAGNOSIS:   Obese class I related to overeating and poor lifestyle habits as evidence by patient's subjective statements and  BMI of 32.93 kg/m2   NUTRITION INTERVENTIONS  Nutrition Prescription:  Recommend modified energy- nutrient intake  Implementation:  Nutrition Education (Content):  Discussed portion sizes and plate method  Provided: Tips for Weight Loss and Weight Management, Protein Sources for Weight Loss, Fiber Content in Food, Plate Method    Nutrition Education (Application):   Patient to practice goals as stated below  Patient verbalizes understanding of diet by stating she will have meals at regular intervals  Anticipate fair-good compliance    Goals:  Trial of protein supplement as needed   Eat balanced meals at regular intervals  Keep easy foods on hand to avoid skipped " meals (list provided)      FOLLOW UP AND MONITORING:    Other  - follow up in 4-8 weeks.     TIME SPENT WITH PATIENT:   30 minutes        Avelina Menchaca RD, LD  Clinical Dietitian

## 2024-02-21 NOTE — PATIENT INSTRUCTIONS
"Nice to talk with you today! Thank you for allowing me the privilege of caring for you.   We hope we provided you with the excellent service you deserve.     To ensure the quality of our services you may receive a patient satisfaction survey from an independent monitoring company.  The greatest compliment you can give is \"Likely to Recommend\"      Below is our plan we discussed.-  ASHA Padilla        PATIENT INSTRUCTIONS:  Patient picked sleep as 1st lifestyle habit to address - google sleep hygiene  Get labs drawn  See dietitian  60 oz water daily minimum  Consistent meals daily - will start with daily breakfast  Have healthy protein snacks in car and desk  See primary about HTN      Please schedule a follow up with Valerie Cortez PA-C in 3 months.  If you need to reach me sooner you can do so by calling [phone].    Have a great day!       WEGOVY (semaglutide)      Wegovy (semaglutide) injection 2.4 mg is an injectable prescription medicine used for adults with obesity (BMI ?30) or overweight (excess weight) (BMI ?27) who also have weight-related medical problems to help them lose weight and keep the weight off.  It is a GLP-1 agonist medication. GLP1 agonists stimulate the hormone GLP-1 in your body o allow you to feel full quickly and stay full longer.    Due to the shortage, You may need to be persistent and patient to get these initial dosages due to the shortage.  Once you are able to obtain the 0.25 and 0.5 mg and 1 mg dose \"12 weeks of therapy\" you can begin treatment.     Directions:  Start Wegovy (semaglutide) 0.25 mg once weekly for 4 weeks, then if tolerating increase to 0.5 mg weekly for 4 weeks, then if tolerating increase to 1 mg weekly for 4 weeks, then if tolerating increase to 1.7 mg weekly for 4 weeks, then if tolerating increase to 2.4 mg weekly thereafter.      -Each Wegovy pen is a once weekly single-dose prefilled pen with a pen injector already built within the pen. Discard the Wegovy pen " after use in sharps container.     Common Side Effects:    nausea, headache, diarrhea, stomach upset.  If these become unmanageable call or mychart.    Serious Side effects:   Pancreatitis (inflammation of the pancreas) has been associated with this type of medication, but is very rare.Symptoms of pancreatitis include: Pain in your upper stomach area which may travel to your back and be worse after eating.     Storage:   Store the prescription in the refrigerator. Once it is time to use the Wegovy pen, you can keep the pen at room temperature and it is good for up to 28 days at room temperature.     How to inject:  For a video on how to use the pen please go to:  https://www.[employer].Smart Adventure/about-wegovy/how-to-use-the-wegovy-pen.html#itemTwo       For any questions or concerns please send a PRX message to our team or call our weight management call center at [phone] during regular business hours. For questions during evenings or weekends your messages will be addressed during the next business day.  For emergencies please call 911 or seek immediate medical care.

## 2024-02-21 NOTE — PATIENT INSTRUCTIONS
"Girma Johnson!        It was great meeting with you today! Here are some links to the handouts I referenced:        Protein Sources:  [email or web address]     Fiber Sources:  [email or web address]     My Plate:  https://www.choosemyplate.gov/        A helpful search term to type into [employer], [employer], etc is \"myplate examples.\" [myplate examples - Google Search]     Key points from today:  Eat 3 meals/day at consistent intervals  Shoot for 60-90 protein (20-30g/meal)  Aim for 25-35g fiber (5-10g/meal)  Eat slowly: 20-30 minutes per meal     Here is a summary of the goals that we discussed:     1. Protein Supplements:  <250 calories  15-30g protein  <10g fat  <10g sugar    Examples: Premier Protein, Ensure MAX, Fair Life     2. Eat at regular intervals  - First meal within 1 hour of waking up, then eat every 4-6 hours     3. Keep easy foods on hand   - (see easy foods list)        Let's plan on following up in 1-2 months. This can be scheduled via our call center at . Of course, reach out sooner if you have any questions or concerns. Have a great week and welcome to the program!        Avelina Menchaca RD, LD?  Clinical Dietitian   "

## 2024-02-21 NOTE — PROGRESS NOTES
"New Medical Weight Management Consult        PATIENT:  Elizabeth Gil   MRN:         [id number]   :         1980  MELINA:         2024      Dear [facility] - Juan Miguel,    I had the pleasure of seeing your patient, Elizabeth Gil. Full intake/assessment was done to determine barriers to weight loss success and develop a treatment plan. Elizabeth Gil is a 44 year old female interested in treatment of medical problems associated with excess weight. She has a height of 5' 3.25\"[measured 24[, a weight of 187 lbs 6.4 oz, and the calculated Body mass index is 32.93 kg/m .    Was seen in . Working 50+ hours at [employer] and has high stress level. No eating schedule. Will get home from work tired and will sometimes stress eat.      Assessment & Plan   Problem List Items Addressed This Visit       Other migraine without status migrainosus, intractable    Class 1 obesity due to excess calories with serious comorbidity and body mass index (BMI) of 32.0 to 32.9 in adult - Primary     Start wegovy         Relevant Medications    Semaglutide-Weight Management (WEGOVY) 0.25 MG/0.5ML pen    Semaglutide-Weight Management (WEGOVY) 0.5 MG/0.5ML pen    Other Relevant Orders    TSH with free T4 reflex    Hemoglobin A1c [LAB90] (Future)    Vitamin D Deficiency [PKH294] (Future)    Basic metabolic panel [LAB15] (Future)    Nutrition Referral        PROGRAM OVERVIEW  Reviewed options at [facility] including provider visits, dietician, 24 week healthy lifestyle program, health coaching, food supplements, Get Moving program, and psychological support.  All questions about weight loss program were answered.       MEDICATIONS:  We discussed healthy habits to assist with weight loss. We reviewed medications associated with weight gain. We discussed the role of pharmacological agents in the treatment of obesity and the \"off-label\" use of medications in this practice. We reviewed medication " that may assist with weight loss. Indications, contraindications, risks/benefits, and potential side effects were discussed.  Wegovy was prescribed. Discussed that medications must always be used together with lifestyle changes such as improvements in diet choices, portion control and establishing and maintaining a regular exercise program.     Spent a good amount of time educating patient regarding potential side effects of wegovy and changes she will need to make to minimize.     AOM Considerations:  Phentermine:  Has taken but blood pressure too elevated.   Topiramate: Taken for years   GLP-1: Will send over wegovy    Naltrexone:  prescribed in the past. Not sure  Wellbutrin: was on for mental health and not sure if effected appetite  Metformin: option             PATIENT INSTRUCTIONS:  Patient picked sleep as 1st lifestyle habit to address - google sleep hygiene  Get labs drawn  See dietitian  60 oz water daily minimum  Consistent meals daily - will start with daily breakfast  Have healthy protein snacks in car and desk  See primary about HTN      COUNSELING:   Reviewed obesity as a chronic disease and comprehensive management stratagies.      We discussed Bariatric Basics including:  -eating 3 meals daily  -eating protein first  -eating slowly, chewing food well  -avoiding/limiting calorie containing beverages  -limiting carbohydrates and changing to whole grains  -limiting restaurant or cafeteria eating to twice a week or less    We discussed the importance of restorative sleep and stress management in maintaining a healthy weight.  We discussed insulin resistance and glycemic index as it relates to appetite and weight control.   We discussed the importance of physical activity including cardiovascular and strength training in maintaining a healthier weight and explored viable options.  Patient education of above written in AVS.        FOLLOW-UP:  Please call [phone] to schedule your next visit in    50  minutes spent on the date of the encounter doing chart review, review of test results, patient visit and documentation          Weight Related Co-morbidities:          I have the following health issues associated with obesity:    I have the following symptoms associated with obesity:      Patient Active Problem List   Diagnosis    PASTORA III (cervical intraepithelial neoplasia III)    Female stress incontinence    Social anxiety disorder    Other migraine without status migrainosus, intractable    Iron deficiency    Eczema, unspecified type    Class 1 obesity due to excess calories with serious comorbidity and body mass index (BMI) of 32.0 to 32.9 in adult    Hypertension    Abdominal pain, right lower quadrant    Abdominal pain, left lower quadrant    Adnexal mass    Abnormal Pap smear of cervix         Weight History    Previously had weight loss surgery:    Age pt became overweight:      The following factors contributed to weight gain:       I have tried the following methods to lose weight:      My lowest weight since age 20 was:    My highest weight since age 20 was:    The most weight I have ever lost was: (lbs)    Family hx of obesity:    Do you know anyone who has had weight loss surgery?    How has your weight changed over the last year?         Diet Recall     Wake Up: Can wake up at noon as works late.       Breakfast    Lunch [employer] burgers and fries at 1 pm with coke   Supper; 10 pm tuna with eggs    Bedtime:   Snack between meals:   Snack in evening:                Typical snacks:    Caloric beverages per day. What type:    Water consumed daily: Over 30 oz water daily, 3-4 cups of coffee, 3 cups of lemonade. [place]. 3-4 servings of alcohol weekly   Low yonas/diet drinks:   Alcohol:   Foods you crave:                  Sleep History    How many hours do you sleep at night? 5-6 hours    Do you think you have sleep apnea?     Do you snore so loudly some one can hear you through a closed door?    Has anyone  seen or heard you stop breathing during your sleep?     Do you often feel tired, fatigued, or sleepy during the day? yes   Do you have a TV/Computer in your bedroom?          Eating Habits (Yes/No) (Never, Daily, Several Days, Weekly,Monthly)   Healthy Eater    Generally, eat a lot of Simple Carbs/processed boxed foods    Generally, eat a lot of fatty foods    Eat fast food  Several times a week      Eat Take out/sit-down restaurant. 5-6 times daily       Eat most meals in front of screens    Skip meals    Grazes all day    Snacks between meals.    Eat most food at end of day.    Eat in middle of night     Eat  to prevent or correct low blood sugar.    Eat to prevent GERD    Worry about not having enough food to eat.    Constant Dieter    I emotionally eat. (stressed, depressed, anxious, bored)    I feel hungry all the time-even if I just have eaten.    Feeling full is important to me.    I finish all the food on my plate-even if I am already full.    I eat/snack without noticing that I am eating.    I eat when I am preparing the meal.    I have trouble not eating junk if they are around the house.    I think about food all day.    Who does the grocery shopping?    Who does the cooking?        Eating Disorder Screen    I binge eat Yes - usually ate chips and dips and sandwich. Several times weekly. Tired and stressed      I make myself vomit what I have eaten or use laxatives to get rid of food.    I eat a large amount of food, like a loaf of bread, a box of cookies, a pint/quart of ice cream, all at once.    I eat a large amount of food even when I am not hungry.    I eat rapidly.    I eat alone because I feel embarrassed and do not want others to see how much I have eaten.    I eat until I am uncomfortably full.    I feel bad, disgusted, or guilty after I overeat. yes       Activity/Exercise History    How many hours of screen time do you have daily?    How many times a week are you active for the purpose of  exercise?    What do you do for exercise?    For how long to your exercise for?    What keeps you from being more active?        Past Medical History:   Diagnosis Date    Abnormal Pap smear of cervix 10/18/2018    10/18/18: See problem list.     Cervical high risk HPV (human papillomavirus) test positive 10/18/2018, 6/12/19    See problem list    Complex cyst of left ovary     Female stress incontinence     Hypertension     Migraine           Work/Social History Reviewed With Patient    My employment status is: Full time    My job is:    How much of your job is spent on the computer or phone?    How many hours do you spend commuting to work daily?     What is your marital status?    If in a relationship, is your significant other overweight?    Do you have children?    If you have children, are they overweight?    Who do you live with?     Are they supportive of your health goals?    Who does the food shopping?       Social History     Tobacco Use    Smoking status: Some Days     Packs/day: .5     Types: Cigarettes     Start date: 2002    Smokeless tobacco: Never    Tobacco comments:     smokes 1/2 ppd (2/2/22)   Vaping Use    Vaping Use: Never used   Substance Use Topics    Alcohol use: Yes     Comment: 2-3 week    Drug use: No        Mental Health History Reviewed With Patient    How often in the past 2 weeks have you felt little interest or pleasure in doing things?    Over the past 2 weeks how often have you felt down, depressed, or hopeless?    Does your mental health effect your weight or vice versa?    How do you handle stress?  What coping techniques do you use?    Do you see a therapist?      Would you like to be connected with a therapist?        MEDICATIONS:   Current Outpatient Medications   Medication    acetaminophen (TYLENOL) 325 MG tablet    Emollient (AQUAPHOR ADVANCED THERAPY) OINT    ibuprofen (ADVIL/MOTRIN) 800 MG tablet    Multiple Vitamin (MULTIVITAMIN ADULT) TABS     "Semaglutide-Weight Management (WEGOVY) 0.25 MG/0.5ML pen    Semaglutide-Weight Management (WEGOVY) 0.5 MG/0.5ML pen    vitamin D3 (CHOLECALCIFEROL) 2000 units (50 mcg) tablet    acetaminophen (TYLENOL) 325 MG tablet    acetaminophen (TYLENOL) 500 MG tablet    bisacodyl (DULCOLAX) 5 MG EC tablet    hydrochlorothiazide (HYDRODIURIL) 25 MG tablet    magnesium citrate solution    phentermine (ADIPEX-P) 37.5 MG tablet    polyethylene glycol (GOLYTELY) 236 g suspension    senna-docusate (SENOKOT-S/PERICOLACE) 8.6-50 MG tablet    UNABLE TO FIND    verapamil ER (CALAN-SR) 180 MG CR tablet     No current facility-administered medications for this visit.        ALLERGIES:      Allergies   Allergen Reactions    Morphine Itching        ROS: 2/21/24  General  Fatigue: better  Sleep Quality: birth  Birth Control: No.   HEENT  Hx of glaucoma:  No   Vision changes: No  Cardiovascular  Chest Pain with Exertion:  History of HTN. Not well controlled  Palpitations: no  Hx of heart disease: not sure  Pulmonary  Shortness of breath at rest: No  Shortness of breath with exertion: yes  Snoring: yes  Gastrointestinal  Heartburn: sometimes  Abdominal pain: No  History of pancreatitis: No  Severe constipation: sometimes  Gastrourinary  History of kidney stones: No  Psychiatric  Moods Stable: yes  History of drug abuse: No  No eating disorder history   Endocrine  Polydipsia: sometimes  Polyuria: Yes  Neurologic:  Hx of seizures: No  Hx of paresthesias  Yes in both hands  Personal or family history of thyroid cancer: No      BP Readings from Last 6 Encounters:   02/21/24 (!) 160/98   12/22/23 130/82   05/05/22 (!) 160/105   05/02/22 (!) 174/90   02/19/22 (!) 140/88   02/07/22 (!) 144/102       Pulse Readings from Last 6 Encounters:   02/21/24 95   12/22/23 104   05/05/22 85   05/02/22 96   02/19/22 89   02/07/22 78          PHYSICAL EXAM:  Ht 5' 9\" (1.753 m)   Wt 195 lb (88.5 kg)   BMI 28.80 kg/m    GENERAL: Healthy, alert and no " distress  EYES: Eyes grossly normal to inspection.  No discharge or erythema, or obvious scleral/conjunctival abnormalities.  RESP: No audible wheeze, cough, or visible cyanosis.  No visible retractions or increased work of breathing.    SKIN: Visible skin clear. No significant rash, abnormal pigmentation or lesions.  NEURO: Cranial nerves grossly intact.  Mentation and speech appropriate for age.  PSYCH: Mentation appears normal, affect normal/bright, judgement and insight intact, normal speech and appearance well-groomed.      Sincerely,      Valerie Cortez PA-C

## 2024-02-21 NOTE — NURSING NOTE
Patient's measurements today were:    Neck = 14.25 inches    Waist = 37.5 inches    Hips = 44.25 inches    Naomi Briscoe MA on 2/21/2024 at 9:19 AM       [place]um[place] Counseling: I discussed with the patient the risks of tildrakizumab including but not limited to immunosuppression, malignancy, posterior leukoencephalopathy syndrome, and serious infections.  The patient understands that monitoring is required including a PPD at baseline and must alert us or the primary physician if symptoms of infection or other concerning signs are noted.

## 2024-02-25 ENCOUNTER — HEALTH MAINTENANCE LETTER (OUTPATIENT)
Age: 44
End: 2024-02-25

## 2024-04-02 ENCOUNTER — TELEPHONE (OUTPATIENT)
Dept: SURGERY | Facility: CLINIC | Age: 44
End: 2024-04-02

## 2024-04-02 NOTE — TELEPHONE ENCOUNTER
Prior Authorization Retail Medication Request  2  Medication/Dose: Wegovy 0.25mg/0.5ml   emaglutide-Weight Management (WEGOVY) 0.5 MG/0.5ML pen  Diagnosis and ICD code (if different than what is on RX):  E66.09, z68.32  New/renewal/insurance change PA/secondary ins. PA:  Previously Tried and Failed:    Rationale:      Insurance   Primary: [facility]   Insurance ID:    [id number]         Pharmacy Information (if different than what is on RX)  Name:  [facility]  Phone:  [phone]  Fax:[phone]

## 2024-04-13 ENCOUNTER — TELEPHONE (OUTPATIENT)
Dept: SURGERY | Facility: CLINIC | Age: 44
End: 2024-04-13

## 2024-04-13 NOTE — TELEPHONE ENCOUNTER
PA Initiation    Medication: WEGOVY 0.5 MG/0.5ML SC SOAJ  Insurance Company: [place] Medicaid ([facility]) - Phone [phone] Fax [phone]  Pharmacy Filling the Rx: [id number] - [facility] [address] AT [place] [address] & [facility]  Filling Pharmacy Phone: [phone]  Filling Pharmacy Fax: [phone]  Start Date: 4/13/2024

## 2024-04-13 NOTE — TELEPHONE ENCOUNTER
PA Initiation    Medication: WEGOVY 0.25 MG/0.5ML SC SOAJ  Insurance Company: [place] Medicaid ([facility]) - Phone [phone] Fax [phone]  Pharmacy Filling the Rx: [id number] - [facility] [address] AT [place] [address] & [facility]  Filling Pharmacy Phone: [phone]  Filling Pharmacy Fax: [phone]  Start Date: 4/13/2024

## 2024-04-15 NOTE — TELEPHONE ENCOUNTER
PRIOR AUTHORIZATION DENIED    Medication: WEGOVY 0.5 MG/0.5ML SC SOAJ    Insurance Company: [place] Medicaid ([facility]) - Phone [phone] Fax [phone]    Denial Date: 4/15/2024    Denial Reason(s):       Appeal Information:

## 2024-04-15 NOTE — TELEPHONE ENCOUNTER
PRIOR AUTHORIZATION DENIED    Medication: WEGOVY 0.25 MG/0.5ML SC SOAJ    Insurance Company: [place] Medicaid ([facility]) - Phone [phone] Fax [phone]    Denial Date: 4/15/2024    Denial Reason(s):       Appeal Information:

## 2024-04-18 ENCOUNTER — TELEPHONE (OUTPATIENT)
Dept: SURGERY | Facility: CLINIC | Age: 44
End: 2024-04-18

## 2024-04-18 NOTE — TELEPHONE ENCOUNTER
Provider requested this writer call pt and request she look at the  msg sent her yesterday and get back to clinic.  This is needed to help a medication she ordered to get approved.  Kimberly Maldonado MS, RD, RN

## 2024-04-19 NOTE — TELEPHONE ENCOUNTER
"     [facility] [place] [id number]  [address]  [place] [address]  Phone: [phone]  Fax: [phone]       4/19/2024    To:   Maricel DIANE    Elizabeth Gil  [address] [address]  1980  [id number]  [id number]    To Whom It May Concern,    I am writing on behalf of my patient, Elizabeth Gil to document the medical necessity of Wegovy for the treatment of Obesity. This letter provides information about the patient's medical history and diagnosis and a statement summarizing my treatment rationale.     Summary of Patient History and Diagnosis  Elizabeth Gil is a 44-year-old female with a diagnosis of obesity (BMI at least 30 kg/m ). The patient has been working with [facility] with Valerie Cortez PA-C, and a team of dietitians.      Current Weight and BMI  Estimated body mass index is 32.92 kg/m  as calculated from the following:    Height as of an earlier encounter on 2/21/2024: 5' 3.5\".    Weight as of an earlier encounter on 2/21/2024: 187 lbs 6.4 oz    Treatment Rationale  The patient has tried and failed the following non-pharmacological options and/or medications to achieve successful weight loss: diet and lifestyle changes for 2 months - from 2/21/24 to present.    The following counseling was provided in the initial consultation:  We discussed Bariatric Basics including:  -eating 3 meals daily  -eating protein first  -eating slowly, chewing food well  -avoiding/limiting calorie containing beverages  -limiting carbohydrates and changing to whole grains  -limiting restaurant or cafeteria eating to twice a week or less     We discussed the importance of restorative sleep and stress management in maintaining a healthy weight.  We discussed insulin resistance and glycemic index as it relates to appetite and weight control.   We discussed the importance of physical activity including " cardiovascular and strength training in maintaining a healthier weight and explored viable options.  Since her initial consultation, the patient has began to walk at least 3-4 times a week for 1-2 miles. She has cut out late night snacking and has increased her water intake.  Patient cannot use the following medications due to contraindications or use would produce potential patient harm:     Phentermine: Contraindicated due to hypertension    The patient should receive Prior Authorization approval for Wegovy because of the above information and patient qualifies for use of weight loss medication(s) because said patient has a BMI of at least 30 kg/m  and has no contraindications including a personal or family history of medullary thyroid cancer, MENS2, or current pregnancy for use of Wegovy.   The patient will not be using Wegovy with any other weight loss medication(s).   She has implemented a regimen of increased physical activity.    Duration  12 months    Summary  In summary, Wegovy is medically necessary for this patient s medical condition. Please call my office at [phone] if I can provide you with any additional information to approve my request. I look forward to receiving your timely response and approval of this request.     Sincerely,    Valerie Cortez PA-C  [facility]    [facility]

## 2024-04-19 NOTE — TELEPHONE ENCOUNTER
Medication Appeal Initiation    Medication: WEGOVY 0.25 MG/0.5ML SC SOAJ  Appeal Start Date:  4/19/2024  Insurance Company: [place] Medicaid ([facility]) - Phone [phone] Fax [phone]   Insurance Phone: [phone]   Insurance Fax: [phone]   Comments:

## 2024-04-23 NOTE — TELEPHONE ENCOUNTER
MEDICATION APPEAL APPROVED    Medication: WEGOVY 0.25 MG/0.5ML SC SOAJ  Authorization Effective Date: 4/23/2024  Authorization Expiration Date: 4/30/2024  Approved Dose/Quantity:   Reference #:     Appeal Insurance Company: [place] Medicaid ([facility]) - Phone [phone] Fax [phone]   Expected CoPay: $       CoPay Card Available:    Financial Assistance Needed: YES  Filling Pharmacy: [id number] [address] AT [place] [address] & [facility]  Patient Notified: **Instructed pharmacy to notify patient when script is ready to /ship.**  Comments:

## 2024-05-21 NOTE — PROGRESS NOTES
"Elizabeth is a 44 year old who is being evaluated via a billable video visit.      The patient has been notified of following:     \"This video visit will be conducted via a call between you and your physician/provider. We have found that certain health care needs can be provided without the need for an in-person physical exam.  This service lets us provide the care you need with a video conversation.  If a prescription is necessary we can send it directly to your pharmacy.  If lab work is needed we can place an order for that and you can then stop by our lab to have the test done at a later time.    Video visits are billed at different rates depending on your insurance coverage.  Please reach out to your insurance provider with any questions.    If during the course of the call the physician/provider feels a video visit is not appropriate, you will not be charged for this service.\"    Patient has given verbal consent for Video visit? Yes    How would you like to obtain your AVS? MyChart    If the video visit is dropped, the invitation should be resent by: Text to cell phone: [phone]    Will anyone else be joining your video visit? No    I    Video-Visit Details    Type of service:  Video Visit    Originating Location (pt. Location): Home    Distant Location (provider location):   Off-Site - Provider Home Office    Platform used for Video Visit: INAPPIN    Video Start Time: 2:32 PM    Video End Time:2:43 PM        Return Medical Weight Management Note         Elizabeth Gil  MRN:  [id number]  :  1980  MELINA:  2024        Dear [facility] - Juan Miguel,    I had the pleasure of seeing your patient Elizabeth Gil. She is a 44 year old female who I am continuing to see for treatment of obesity related to:        10/28/2020    11:04 PM   --   I have the following health issues associated with obesity High Blood Pressure    Stress Incontinence    Hypothyroidism   I have the following symptoms " associated with obesity Knee Pain    Depression    Lower Extremity Swelling    Back Pain    Fatigue    Hip Pain         Assessment   Problem List Items Addressed This Visit       Class 1 obesity due to excess calories with serious comorbidity and body mass index (BMI) of 32.0 to 32.9 in adult - Primary    Relevant Medications    Semaglutide-Weight Management (WEGOVY) 1 MG/0.5ML pen    Semaglutide-Weight Management (WEGOVY) 1.7 MG/0.75ML pen    Hypertension    Relevant Medications    Semaglutide-Weight Management (WEGOVY) 1 MG/0.5ML pen    Semaglutide-Weight Management (WEGOVY) 1.7 MG/0.75ML pen          PLAN/DISCUSSION:  Patient will continue to titrate up on the wegovy. Tolerating well. Rx for 1.0 and 1.7 mg doses sent over to pharmacy.       Plan:  Get in 3 meals or at least 1 meal with 2 smaller snacks  Continue wegovy      FOLLOW-UP:  3 months      SUBJECTIVE/OBJECTIVE:  Patient last seen 2/21/2024 and was started on wegovy. Had to clarify some information with insurance so just started the wegovy last month. Does report feeling full quicker. No side effects. Just started the 0.5 mg dose.  Does have only a few minutes available for visit today as she needs to drive to work.   Is taking HTN medication more regularly now.     Anti-obesity medications:   Current: wegovy 0.5 mg on Mondays  Side effects:  Denies nausea, vomiting, abdominal pain, severe constipation, diarrhea, or heartburn      Antiobesity medication history:  Phentermine (Lomaira, Adipex) Has taken but blood pressure too elevated.    Phentermine/Topiramate (Qsymia)    Bupropion/Naltrexone (Contrave)    Liraglutide (Saxenda)    Semaglutide (Wegovy)    Tirzepatide (Zepbound)    Orlistat (Xenical/Jordy)    Off-Label Medications for Obesity  Semaglutide (Ozempic)    Tirzepatide (Mounjaro)    Bupropion (Wellbutrin) was on for mental health and not sure if effected appetite    Metformin(Glucophage) option   Topiramate (Topamax) Taken for years     Naltexone  prescribed in the past.        Recent diet changes: eats 1 meals daily   Over 60 oz water daily     CURRENT WEIGHT:   187 lbs 0 oz    Initial Weight (lbs): 187 lbs  Last Visits Weight: 187 lb (84.8 kg)  Cumulative weight loss (lbs): 0  Weight Loss Percentage: 0%      MEDICATIONS:   Current Outpatient Medications   Medication Sig Dispense Refill    acetaminophen (TYLENOL) 325 MG tablet Take 2 tablets (650 mg) by mouth every 6 hours as needed for mild pain 24 tablet 0    hydrochlorothiazide (HYDRODIURIL) 25 MG tablet TAKE 1 TABLET(25 MG) BY MOUTH DAILY 90 tablet 3    Semaglutide-Weight Management (WEGOVY) 0.5 MG/0.5ML pen Inject 0.5 mg Subcutaneous every 7 days 2 mL 1    Semaglutide-Weight Management (WEGOVY) 1 MG/0.5ML pen Inject 1 mg Subcutaneous every 7 days 2 mL 1    Semaglutide-Weight Management (WEGOVY) 1.7 MG/0.75ML pen Inject 1.7 mg Subcutaneous every 7 days 3 mL 1    acetaminophen (TYLENOL) 325 MG tablet Take 3 tablets (975 mg) by mouth every 6 hours as needed for mild pain (Patient not taking: Reported on 12/22/2023) 24 tablet 0    acetaminophen (TYLENOL) 500 MG tablet Take 1-2 tablets (500-1,000 mg) by mouth every 8 hours as needed for mild pain, fever or pain Don't take more than 3,000 mg in 24 hrs (Patient not taking: Reported on 2/21/2024) 60 tablet 0    bisacodyl (DULCOLAX) 5 MG EC tablet Take as directed. One day prior to exam at 10:00am take 2 tablets (Patient not taking: Reported on 12/22/2023) 2 tablet 0    Emollient (AQUAPHOR ADVANCED THERAPY) OINT Externally apply topically 3 times daily as needed (itching) 85 g 3    ibuprofen (ADVIL/MOTRIN) 800 MG tablet Take 1 tablet (800 mg) by mouth every 6 hours as needed for other (mild and/or inflammatory pain) (Patient not taking: Reported on 6/5/2024) 12 tablet 0    magnesium citrate solution Take as directed. Two days prior to exam drink 10oz bottle of magnesium citrate at 4:00pm (Patient not taking: Reported on 12/22/2023) 296 mL 0    Multiple Vitamin  "(MULTIVITAMIN ADULT) TABS Take 1 tablet by mouth every morning (Patient not taking: Reported on 6/5/2024)      phentermine (ADIPEX-P) 37.5 MG tablet Take 1 tablet (37.5 mg) by mouth every morning (before breakfast) (Patient not taking: Reported on 12/22/2023) 30 tablet 3    polyethylene glycol (GOLYTELY) 236 g suspension Take as directed. One day before your exam fill the first container with water. Cover and shake until mixed well. At 3:00pm drink one 8oz glass every 10-15 minutes until half of the first container is empty. Store the remainder in the refrigerator. At 8:00pm drink the second half of the first container until it is gone. Before you go to bed mix the second container with water and put in refrigerator. Six hours before your check in time drink one 8oz glass every 10-15 minutes until half of container is empty. Discard the remainder of solution. (Patient not taking: Reported on 12/22/2023) 8000 mL 0    Semaglutide-Weight Management (WEGOVY) 0.25 MG/0.5ML pen Inject 0.25 mg Subcutaneous every 7 days (Patient not taking: Reported on 6/5/2024) 2 mL 0    senna-docusate (SENOKOT-S/PERICOLACE) 8.6-50 MG tablet Take 2 tablets by mouth 2 times daily as needed for constipation (Patient not taking: Reported on 12/22/2023) 60 tablet 1    UNABLE TO FIND every morning MEDICATION NAME: Ross Breen daily (Patient not taking: Reported on 12/22/2023)      verapamil ER (CALAN-SR) 180 MG CR tablet Take 1 tablet (180 mg) by mouth At Bedtime (Patient not taking: Reported on 6/5/2024) 90 tablet 3    vitamin D3 (CHOLECALCIFEROL) 2000 units (50 mcg) tablet TAKE 2 TABLETS BY MOUTH DAILY (Patient not taking: Reported on 6/5/2024) 180 tablet 2         ROS:  General  Fatigue: No  Sleep Quality: OK      PHYSICAL EXAM:  Objective    Ht 5' 3.25\" (1.607 m)   Wt 187 lb (84.8 kg)   BMI 32.86 kg/m    GENERAL: Healthy, alert and no distress  EYES: Eyes grossly normal to inspection.  No discharge or erythema, or obvious " scleral/conjunctival abnormalities.  RESP: No audible wheeze, cough, or visible cyanosis.  No visible retractions or increased work of breathing.    SKIN: Visible skin clear. No significant rash, abnormal pigmentation or lesions.  NEURO: Cranial nerves grossly intact.  Mentation and speech appropriate for age.  PSYCH: Mentation appears normal, affect normal/bright, judgement and insight intact, normal speech and appearance well-groomed.        Sincerely,    Valerie Cortez PA-C    20 minutes spent on the date of the encounter doing chart review, review of test results, patient visit and documentation

## 2024-06-05 ENCOUNTER — TELEPHONE (OUTPATIENT)
Dept: SURGERY | Facility: CLINIC | Age: 44
End: 2024-06-05
Payer: COMMERCIAL

## 2024-06-05 NOTE — TELEPHONE ENCOUNTER
Prior Authorization Specialty Medication Request    Medication/Dose: Wegovy 0.5 mg  Diagnosis and ICD code (if different than what is on RX):  E66.09  New/renewal/insurance change PA/secondary ins. PA:  Previously Tried and Failed:  Diet and lifestyle changes     Important Lab Values:   Rationale:     Insurance   Primary: [facility]   Insurance ID:  [id number]        Pharmacy Information (if different than what is on RX)  Name:  [facility]  Phone:  [phone]  Fax:[phone]      Park Newman MA

## 2024-06-06 ENCOUNTER — VIRTUAL VISIT (OUTPATIENT)
Dept: SURGERY | Facility: CLINIC | Age: 44
End: 2024-06-06
Payer: COMMERCIAL

## 2024-06-06 VITALS — HEIGHT: 63 IN | WEIGHT: 187 LBS | BODY MASS INDEX: 33.13 KG/M2

## 2024-06-06 DIAGNOSIS — E66.09 CLASS 1 OBESITY DUE TO EXCESS CALORIES WITH SERIOUS COMORBIDITY AND BODY MASS INDEX (BMI) OF 32.0 TO 32.9 IN ADULT: Primary | ICD-10-CM

## 2024-06-06 DIAGNOSIS — I10 PRIMARY HYPERTENSION: ICD-10-CM

## 2024-06-06 DIAGNOSIS — E66.811 CLASS 1 OBESITY DUE TO EXCESS CALORIES WITH SERIOUS COMORBIDITY AND BODY MASS INDEX (BMI) OF 32.0 TO 32.9 IN ADULT: Primary | ICD-10-CM

## 2024-06-06 PROCEDURE — 99213 OFFICE O/P EST LOW 20 MIN: CPT | Mod: 95 | Performed by: PHYSICIAN ASSISTANT

## 2024-06-06 RX ORDER — SEMAGLUTIDE 1.7 MG/.75ML
1.7 INJECTION, SOLUTION SUBCUTANEOUS
Qty: 3 ML | Refills: 1 | Status: SHIPPED | OUTPATIENT
Start: 2024-06-06

## 2024-06-06 RX ORDER — SEMAGLUTIDE 1 MG/.5ML
1 INJECTION, SOLUTION SUBCUTANEOUS
Qty: 2 ML | Refills: 1 | Status: SHIPPED | OUTPATIENT
Start: 2024-06-06

## 2024-06-06 NOTE — PATIENT INSTRUCTIONS
"Nice to talk with you today! Thank you for allowing me the privilege of caring for you.   We hope we provided you with the excellent service you deserve.     To ensure the quality of our services you may receive a patient satisfaction survey from an independent monitoring company.  The greatest compliment you can give is \"Likely to Recommend\"      Below is our plan we discussed.-  ASHA Padilla      Plan:  Get in 3 meals or at least 1 meal with 2 smaller snacks  Continue wegovy    Please call [phone] and schedule a follow up with Valerie Cortez PA-C in 3 months.  If you need to reach me sooner you can do so by calling [phone].    Have a great day!     "

## 2024-06-07 NOTE — TELEPHONE ENCOUNTER
Prior Authorization Approval    Medication: SEMAGLUTIDE-WEIGHT MANAGEMENT 0.5 MG/0.5ML SC SOAJ  Authorization Effective Date: 6/7/2024  Authorization Expiration Date:  6 Month  Approved Dose/Quantity:   Reference #:     Insurance Company: [employer] - Phone [phone] Fax [phone]  Expected CoPay: $    CoPay Card Available:      Financial Assistance Needed:   [id number] [id number] AT [place] [address] & [facility]  Pharmacy Notified: faxed pharmacy approval   Patient Notified:  informed pharmacy to contact patient

## 2024-09-22 ENCOUNTER — HEALTH MAINTENANCE LETTER (OUTPATIENT)
Age: 44
End: 2024-09-22

## 2025-03-10 ENCOUNTER — TELEPHONE (OUTPATIENT)
Dept: SURGERY | Facility: CLINIC | Age: 45
End: 2025-03-10
Payer: COMMERCIAL

## 2025-03-10 NOTE — TELEPHONE ENCOUNTER
Returned call.  Provider will discuss restart when seen 4/2/25.  Patient verbalized understanding and is agreeable to plan.  Kimberly Maldonado MS, RD, RN

## 2025-03-10 NOTE — TELEPHONE ENCOUNTER
General Call    Contacts       Contact Date/Time Type Contact Phone/Fax    03/10/2025 02:17 PM CDT Phone (Incoming) Elizabeth Gil (Self) [phone] (M)          Reason for Call:  Wegovy    What are your questions or concerns:  pt has ins again and would like to refill Wegovy, [facility] [place]      Could we send this information to you in [facility] or would you prefer to receive a phone call?:   Patient would prefer a phone call   Okay to leave a detailed message?: Yes at Cell number on file:    Telephone Information:   Mobile [phone]

## 2025-04-01 NOTE — PROGRESS NOTES
"  Return Medical Weight Management Note         Elizabeth Gil  MRN:  [id number]  :  1980  MELINA:  2025        Dear [facility] - [facility],    I had the pleasure of seeing your patient Elizabeth Gil. She is a 45 year old female who I am continuing to see for treatment of obesity related to:        10/28/2020    11:04 PM   --   I have the following health issues associated with obesity High Blood Pressure    Stress Incontinence    Hypothyroidism   I have the following symptoms associated with obesity Knee Pain    Depression    Lower Extremity Swelling    Back Pain    Fatigue    Hip Pain       Assessment   Problem List Items Addressed This Visit       Class 1 obesity due to excess calories with serious comorbidity and body mass index (BMI) of 33.0 to 33.9 in adult - Primary    Relevant Orders    Comprehensive metabolic panel [LAB17] (Future)    Hemoglobin A1c [LAB90] (Future)    TSH with free T4 reflex    Vitamin D Deficiency [WKT190] (Future)    Hypertension          PLAN/DISCUSSION:  Discussed in detail the importance of getting blood pressure under control. When patient seen last year this was also the discussion. Patient does not have primary currently. Due to recent headaches and elevated BP today and from last week will have patient schedule with new primary but in the meantime visit urgent care to potentially be given a 30 day supply of HTN medications.     We discussed the role of pharmacological agents in the treatment of obesity and the \"off-label\" use of medications in this practice. We discussed the risks and benefits of each. We discussed indications, contraindications, potential side effects, and estimated costs of each. Discussed that medications must always be used together with lifestyle changes such as improvements in diet choices, portion control and establishing and maintaining a regular exercise program.     Discussed starting zepbound.     Stressed the urgent need for " primary follow up for Hypertension.   3 meals daily  Get labs drawn     Upon review of labs and visit notes from primary/Urgent Care for HTN will send over initial dose of zepbound     FOLLOW-UP:  July        SUBJECTIVE/OBJECTIVE:  Patient last seen 6/6/2024. Was continued on wegovy. Was on for about 3 months. Last use was around Sept 2024. Had insurance change and has not been restarted. 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   Blood pressure is noted to be elevated today and patient reports it was more elevated last week when she went to ED. Says the initial reading in the ED was 200?/100?  Also reports several headaches over the past week.      Is interested in restarting an injectable. Labs that were placed last year were not drawn.        Antiobesity medication history:  Phentermine (Lomaira, Adipex) Has taken but blood pressure too elevated.    Phentermine/Topiramate (Qsymia)     Bupropion/Naltrexone (Contrave)     Liraglutide (Saxenda)     Semaglutide (Wegovy)  has taken but paused due to change in insurance   Tirzepatide (Zepbound)     Orlistat (Xenical/Jordy)     Off-Label Medications for Obesity  Semaglutide (Ozempic)     Tirzepatide (Mounjaro)     Bupropion (Wellbutrin) was on for mental health and not sure if effected appetite    Metformin(Glucophage) option   Topiramate (Topamax) Taken for years     Naltexone prescribed in the past.         Recent diet changes: drinks: 24 oz water, sweet tea 16-20 oz   1 meal dinner     Recent stressors: high      CURRENT WEIGHT:   188 lbs 0 oz    Initial Weight (lbs): 187 lbs  Last Visits Weight: 187 lb (84.8 kg)  Cumulative weight loss (lbs): -1  Weight Loss Percentage: -0.53%      MEDICATIONS:   Current Outpatient Medications   Medication Sig Dispense Refill    acetaminophen (TYLENOL) 325 MG tablet Take 3 tablets (975 mg) by mouth every 6 hours as needed for mild pain 24 tablet 0    acetaminophen (TYLENOL) 325 MG tablet Take 2 tablets  (650 mg) by mouth every 6 hours as needed for mild pain 24 tablet 0    acetaminophen (TYLENOL) 500 MG tablet Take 1-2 tablets (500-1,000 mg) by mouth every 8 hours as needed for mild pain, fever or pain Don't take more than 3,000 mg in 24 hrs 60 tablet 0    bisacodyl (DULCOLAX) 5 MG EC tablet Take as directed. One day prior to exam at 10:00am take 2 tablets 2 tablet 0    Emollient (AQUAPHOR ADVANCED THERAPY) OINT Externally apply topically 3 times daily as needed (itching) 85 g 3    hydrochlorothiazide (HYDRODIURIL) 25 MG tablet TAKE 1 TABLET(25 MG) BY MOUTH DAILY 90 tablet 3    ibuprofen (ADVIL/MOTRIN) 800 MG tablet Take 1 tablet (800 mg) by mouth every 6 hours as needed for other (mild and/or inflammatory pain) 12 tablet 0    magnesium citrate solution Take as directed. Two days prior to exam drink 10oz bottle of magnesium citrate at 4:00pm 296 mL 0    Multiple Vitamin (MULTIVITAMIN ADULT) TABS Take 1 tablet by mouth every morning.      senna-docusate (SENOKOT-S/PERICOLACE) 8.6-50 MG tablet Take 2 tablets by mouth 2 times daily as needed for constipation 60 tablet 1    verapamil ER (CALAN-SR) 180 MG CR tablet Take 1 tablet (180 mg) by mouth At Bedtime 90 tablet 3    phentermine (ADIPEX-P) 37.5 MG tablet Take 1 tablet (37.5 mg) by mouth every morning (before breakfast) (Patient not taking: Reported on 4/2/2025) 30 tablet 3    polyethylene glycol (GOLYTELY) 236 g suspension Take as directed. One day before your exam fill the first container with water. Cover and shake until mixed well. At 3:00pm drink one 8oz glass every 10-15 minutes until half of the first container is empty. Store the remainder in the refrigerator. At 8:00pm drink the second half of the first container until it is gone. Before you go to bed mix the second container with water and put in refrigerator. Six hours before your check in time drink one 8oz glass every 10-15 minutes until half of container is empty. Discard the remainder of solution.  "(Patient not taking: Reported on 12/22/2023) 8000 mL 0    Semaglutide-Weight Management (WEGOVY) 0.25 MG/0.5ML pen Inject 0.25 mg Subcutaneous every 7 days (Patient not taking: Reported on 4/2/2025) 2 mL 0    Semaglutide-Weight Management (WEGOVY) 0.5 MG/0.5ML pen Inject 0.5 mg Subcutaneous every 7 days (Patient not taking: Reported on 4/2/2025) 2 mL 1    Semaglutide-Weight Management (WEGOVY) 1 MG/0.5ML pen Inject 1 mg Subcutaneous every 7 days (Patient not taking: Reported on 4/2/2025) 2 mL 1    Semaglutide-Weight Management (WEGOVY) 1.7 MG/0.75ML pen Inject 1.7 mg Subcutaneous every 7 days (Patient not taking: Reported on 4/2/2025) 3 mL 1    UNABLE TO FIND every morning MEDICATION NAME: Sea Breen daily (Patient not taking: Reported on 12/22/2023)      vitamin D3 (CHOLECALCIFEROL) 2000 units (50 mcg) tablet TAKE 2 TABLETS BY MOUTH DAILY (Patient not taking: Reported on 4/2/2025) 180 tablet 2       ROS: 4/2/2025  General  Fatigue: low  Sleep Quality: 3-4 hours   Birth Control: No. Tubal ligation  HEENT  Hx of glaucoma:  No   Vision changes: No  Cardiovascular  Chest Pain with Exertion:  History of HTN. Not well controlled  Palpitations: sometimes   Hx of heart disease: not sure  Pulmonary  Shortness of breath at rest: No  Shortness of breath with exertion: No  Snoring: yes  Gastrointestinal  Heartburn: not as bad lately  Abdominal pain: discomfort  Some constipation -   History of pancreatitis: No  Severe constipation: sometimes  Gastrourinary  History of kidney stones: No  Psychiatric  Moods Stable: yes  History of drug abuse: No  No eating disorder history   Endocrine  Polydipsia: sometimes  Polyuria: Yes  Neurologic:  Hx of seizures: No  Hx of paresthesias  Yes in both hands  Personal or family history of thyroid cancer: No      PHYSICAL EXAM:  Objective    BP (!) 192/125   Pulse 85   Ht 5' 3.25\" (1.607 m)   Wt 188 lb (85.3 kg)   SpO2 100%   BMI 33.04 kg/m    GENERAL: Healthy, alert and no distress  EYES: " Eyes grossly normal to inspection.  No discharge or erythema, or obvious scleral/conjunctival abnormalities.  RESP: No audible wheeze, cough, or visible cyanosis.  No visible retractions or increased work of breathing.    SKIN: Visible skin clear. No significant rash, abnormal pigmentation or lesions.  NEURO: Cranial nerves grossly intact.  Mentation and speech appropriate for age.  PSYCH: Mentation appears normal, affect normal/bright, judgement and insight intact, normal speech and appearance well-groomed.        Sincerely,    Valerie Cortez PA-C    30 minutes spent on the date of the encounter doing chart review, review of test results, patient visit and documentation

## 2025-04-02 ENCOUNTER — OFFICE VISIT (OUTPATIENT)
Dept: SURGERY | Facility: CLINIC | Age: 45
End: 2025-04-02
Payer: COMMERCIAL

## 2025-04-02 VITALS
BODY MASS INDEX: 33.31 KG/M2 | SYSTOLIC BLOOD PRESSURE: 192 MMHG | HEART RATE: 85 BPM | DIASTOLIC BLOOD PRESSURE: 125 MMHG | HEIGHT: 63 IN | OXYGEN SATURATION: 100 % | WEIGHT: 188 LBS

## 2025-04-02 DIAGNOSIS — E66.09 CLASS 1 OBESITY DUE TO EXCESS CALORIES WITH SERIOUS COMORBIDITY AND BODY MASS INDEX (BMI) OF 33.0 TO 33.9 IN ADULT: Primary | ICD-10-CM

## 2025-04-02 DIAGNOSIS — I10 PRIMARY HYPERTENSION: ICD-10-CM

## 2025-04-02 DIAGNOSIS — E66.811 CLASS 1 OBESITY DUE TO EXCESS CALORIES WITH SERIOUS COMORBIDITY AND BODY MASS INDEX (BMI) OF 33.0 TO 33.9 IN ADULT: Primary | ICD-10-CM

## 2025-04-02 PROCEDURE — 99214 OFFICE O/P EST MOD 30 MIN: CPT | Performed by: PHYSICIAN ASSISTANT

## 2025-04-02 PROCEDURE — G2211 COMPLEX E/M VISIT ADD ON: HCPCS | Performed by: PHYSICIAN ASSISTANT

## 2025-04-02 NOTE — PATIENT INSTRUCTIONS
"Nice to talk with you today! Thank you for allowing me the privilege of caring for you.   We hope we provided you with the excellent service you deserve.     To ensure the quality of our services you may receive a patient satisfaction survey from an independent monitoring company.  The greatest compliment you can give is \"Likely to Recommend\"      Below is our plan we discussed.-  ASHA Padilla      Stressed the urgent need for primary follow up for Hypertension.   3 meals daily  Get labs drawn     Upon review of labs and visit notes from primary/Urgent Care for HTN will send over initial dose of zepbound     Please schedule a follow up with Valerie Cortez PA-C in 3 months.  If you need to reach me sooner you can do so by calling [phone].    Have a great day!         Zepbound (Tirzepatide)    Zepbound (Tirzepatide): is an injectable prescription medicine recently FDA approved for adults with obesity or overweight with an additional condition affected by their weight.      It is given as a shot once a week. It activates the body's receptors for GIP (glucose-dependent insulinotropic polypeptide) and GLP-1 (glucagon-like peptide-1) receptor, two naturally occurring hormones that help tell the brain that you are full. It also works is by slowing down how quickly food leaves your stomach.     You will start to feel valentin more quickly, notice portion changes and eat less often between meals.  Patients are advised to eat slowly and purposefully. Give yourself less portions. You may find after starting this medication you have a new point of fullness. Maintain consistent eating schedule with meals +/- snacks and get in good hydration.    Dosing:  Initial dose: 2.5 mg injected subcutaneously once weekly for 4 weeks  Further dose changes can include: increase to 5 mg once weekly for 4 weeks, then 7.5 mg once weekly for 4 weeks, then 10 mg once weekly for 4 weeks then 12.5 mg once weekly for 4 weeks, then 15 mg (maximum " dose) once weekly.    Dose changes are based on how you are tolerating the current dose, what benefits you are seeing at the current dose and if improvement in effectiveness of the drug is needed. The goal is to find the dose that works best for you with the least amount of side effects. You may find you reach this at a lower dose than the maximum dose.     Side effects: Common side effects include nausea, Heartburn,diarrhea, constipation. This is worse when starting the medication and with dose dose escalation.  It does improve with time. Stay adequately hydrated and eat small portions to decrease the risk of side effects.     The risk of pancreatitis (inflammation of the pancreas) has been associated with this type of medication, but is very rare.  If you have had pancreatitis in the past, this medication may not be for you. If you experience persistent severe abdominal pain (sometimes radiating to the back potentially accompanied by vomiting and have a fever), stop the medication and contact your provider immediately for assessment. They will do a blood test to check for pancreatitis.       Caution:   Tirzepatide (Zepbound, Mounjaro) May decrease effectiveness of your oral birth control while starting and with dose adjustments.  Use backup forms of birth control if necessary.      For any questions or concerns please send a [employer] message to our team or call our weight management call center at [phone] during regular business hours.

## (undated) DEVICE — ESU CLEANER TIP 31142717

## (undated) DEVICE — JELLY LUBRICATING SURGILUBE 2OZ TUBE

## (undated) DEVICE — SU SILK 3-0 SH CR 8X18" C013D

## (undated) DEVICE — SPECIMEN CONTAINER 5OZ STERILE 2600SA

## (undated) DEVICE — SOL NACL 0.9% INJ 1000ML BAG 2B1324X

## (undated) DEVICE — ESU ELEC BLADE 6" COATED E1450-6

## (undated) DEVICE — GLOVE PROTEXIS W/NEU-THERA 6.0  2D73TE60

## (undated) DEVICE — LINEN GOWN X4 5410

## (undated) DEVICE — PACK VAG HYST

## (undated) DEVICE — LINEN TOWEL PACK X6 WHITE 5487

## (undated) DEVICE — LINEN TOWEL PACK X5 5464

## (undated) DEVICE — SOL NACL 0.9% IRRIG 1000ML BOTTLE 2F7124

## (undated) DEVICE — GLOVE PROTEXIS MICRO 6.0  2D73PM60

## (undated) DEVICE — BLADE CLIPPER SGL USE 9680

## (undated) DEVICE — KIT PATIENT POSITIONING PIGAZZI LATEX FREE 40580

## (undated) DEVICE — ESU PENCIL W/COATED BLADE E2450H

## (undated) DEVICE — ENDO TROCAR BLUNT TIP KII BALLOON 12X100MM C0R47

## (undated) DEVICE — DRAPE SHEET MED 44X70" 9355

## (undated) DEVICE — ESU ENDO SCISSORS 5MM CVD 5DCS

## (undated) DEVICE — SU VICRYL 0 TIE 54" J608H

## (undated) DEVICE — ESU ELEC LEEP BALL 5MM

## (undated) DEVICE — GLOVE PROTEXIS BLUE W/NEU-THERA 6.0  2D73EB60

## (undated) DEVICE — PREP SCRUB SOL EXIDINE 4% CHG 4OZ 29002-404

## (undated) DEVICE — ENDO TROCAR BLUNT TIP KII BALLOON 12X130MM C0R50

## (undated) DEVICE — SU VICRYL 0 CT-2 CR 8X18" J727D

## (undated) DEVICE — WIPES FOLEY CARE SURESTEP PROVON DFC100

## (undated) DEVICE — PREP CHLORAPREP 26ML TINTED ORANGE  260815

## (undated) DEVICE — SUCTION IRR STRYKERFLOW II W/TIP 250-070-520

## (undated) DEVICE — PAD CHUX UNDERPAD 30X36" P3036C

## (undated) DEVICE — ESU GROUND PAD ADULT W/CORD E7507

## (undated) DEVICE — NDL SPINAL 18GA 3.5" 405184

## (undated) DEVICE — SU VICRYL 0 UR-6 27" J603H

## (undated) DEVICE — SOL WATER IRRIG 1000ML BOTTLE 2F7114

## (undated) DEVICE — ENDO POUCH UNIVERSAL RETRIEVAL SYSTEM INZII 5MM CD003

## (undated) DEVICE — ENDO SCOPE WARMER SEAL  C3101

## (undated) DEVICE — ESU GROUND PAD UNIVERSAL W/O CORD

## (undated) DEVICE — LINEN TOWEL PACK X30 5481

## (undated) DEVICE — SOL NACL 0.9% IRRIG 500ML BOTTLE 2F7123

## (undated) DEVICE — JELLY LUBRICATING SURGILUBE 2OZ TUBE 281020502

## (undated) DEVICE — SWAB PROCTO 16" 2/PK 32-046

## (undated) DEVICE — DRSG TELFA 3X8" 1238

## (undated) DEVICE — ADH SKIN CLOSURE PREMIERPRO EXOFIN 1.0ML 3470

## (undated) DEVICE — ENDO TROCAR FIRST ENTRY KII FIOS ADV FIX 05X100MM CFF03

## (undated) DEVICE — SU MONOCRYL 4-0 PS-2 27" UND Y426H

## (undated) DEVICE — CATH TRAY FOLEY 16FR W/URINE METER STATLOCK 303316A

## (undated) DEVICE — Device

## (undated) DEVICE — SU MONOCRYL 3-0 PS-1 27" Y936H

## (undated) DEVICE — SPECIMEN TRAP MUCOUS 40ML LUKI C30200A

## (undated) DEVICE — ENDO POUCH UNIVERSAL RETRIEVAL SYSTEM INZII 12/15MM CD004

## (undated) DEVICE — SOL WATER IRRIG 500ML BOTTLE 2F7113

## (undated) DEVICE — ESU PENCIL W/HOLSTER E2350H

## (undated) DEVICE — PACK GOWN 3/PK DISP XL SBA32GPFCB

## (undated) DEVICE — ENDO TROCAR SLEEVE KII ADV FIXATION 05X100MM CFS02

## (undated) DEVICE — SUCTION MANIFOLD NEPTUNE 2 SYS 4 PORT 0702-020-000

## (undated) DEVICE — SYR 10ML FINGER CONTROL W/O NDL 309695

## (undated) DEVICE — TUBING SUCTION MEDI-VAC 1/4"X20' N620A

## (undated) DEVICE — ESU LIGASURE LAPAROSCOPIC BLUNT TIP SEALER 5MMX37CM LF1837

## (undated) DEVICE — SUCTION TIP YANKAUER W/O VENT K86

## (undated) DEVICE — SUCTION MANIFOLD NEPTUNE 2 SYS 1 PORT 702-025-000

## (undated) DEVICE — COVER CAMERA IN-LIGHT DISP LT-C02

## (undated) DEVICE — SYR BULB IRRIG DOVER 60 ML LATEX FREE 67000

## (undated) RX ORDER — ONDANSETRON 2 MG/ML
INJECTION INTRAMUSCULAR; INTRAVENOUS
Status: DISPENSED
Start: 2021-12-07

## (undated) RX ORDER — PROPOFOL 10 MG/ML
INJECTION, EMULSION INTRAVENOUS
Status: DISPENSED
Start: 2021-11-16

## (undated) RX ORDER — FENTANYL CITRATE-0.9 % NACL/PF 10 MCG/ML
PLASTIC BAG, INJECTION (ML) INTRAVENOUS
Status: DISPENSED
Start: 2021-11-16

## (undated) RX ORDER — ACETAMINOPHEN 325 MG/1
TABLET ORAL
Status: DISPENSED
Start: 2022-02-07

## (undated) RX ORDER — FENTANYL CITRATE 50 UG/ML
INJECTION, SOLUTION INTRAMUSCULAR; INTRAVENOUS
Status: DISPENSED
Start: 2021-11-16

## (undated) RX ORDER — ONDANSETRON 2 MG/ML
INJECTION INTRAMUSCULAR; INTRAVENOUS
Status: DISPENSED
Start: 2021-11-16

## (undated) RX ORDER — FENTANYL CITRATE 50 UG/ML
INJECTION, SOLUTION INTRAMUSCULAR; INTRAVENOUS
Status: DISPENSED
Start: 2021-12-07

## (undated) RX ORDER — GLYCOPYRROLATE 0.2 MG/ML
INJECTION, SOLUTION INTRAMUSCULAR; INTRAVENOUS
Status: DISPENSED
Start: 2021-12-07

## (undated) RX ORDER — HEPARIN SODIUM 5000 [USP'U]/.5ML
INJECTION, SOLUTION INTRAVENOUS; SUBCUTANEOUS
Status: DISPENSED
Start: 2021-12-07

## (undated) RX ORDER — CEFAZOLIN SODIUM 2 G/50ML
SOLUTION INTRAVENOUS
Status: DISPENSED
Start: 2022-02-07

## (undated) RX ORDER — OXYCODONE HYDROCHLORIDE 5 MG/1
TABLET ORAL
Status: DISPENSED
Start: 2021-12-07

## (undated) RX ORDER — ACETAMINOPHEN 325 MG/1
TABLET ORAL
Status: DISPENSED
Start: 2021-11-16

## (undated) RX ORDER — LIDOCAINE HYDROCHLORIDE 20 MG/ML
INJECTION, SOLUTION EPIDURAL; INFILTRATION; INTRACAUDAL; PERINEURAL
Status: DISPENSED
Start: 2021-12-07

## (undated) RX ORDER — OXYMETAZOLINE HYDROCHLORIDE 0.05 G/100ML
SPRAY NASAL
Status: DISPENSED
Start: 2022-02-07

## (undated) RX ORDER — HYDROMORPHONE HCL IN WATER/PF 6 MG/30 ML
PATIENT CONTROLLED ANALGESIA SYRINGE INTRAVENOUS
Status: DISPENSED
Start: 2021-12-07

## (undated) RX ORDER — ROCURONIUM BROMIDE 50 MG/5 ML
SYRINGE (ML) INTRAVENOUS
Status: DISPENSED
Start: 2021-11-16

## (undated) RX ORDER — ACETAMINOPHEN 325 MG/1
TABLET ORAL
Status: DISPENSED
Start: 2021-12-07

## (undated) RX ORDER — CEFAZOLIN SODIUM 1 G/50ML
SOLUTION INTRAVENOUS
Status: DISPENSED
Start: 2021-12-07

## (undated) RX ORDER — FENTANYL CITRATE 50 UG/ML
INJECTION, SOLUTION INTRAMUSCULAR; INTRAVENOUS
Status: DISPENSED
Start: 2022-02-19

## (undated) RX ORDER — ACETIC ACID 5 %
LIQUID (ML) MISCELLANEOUS
Status: DISPENSED
Start: 2021-12-07

## (undated) RX ORDER — FENTANYL CITRATE 50 UG/ML
INJECTION, SOLUTION INTRAMUSCULAR; INTRAVENOUS
Status: DISPENSED
Start: 2022-02-07

## (undated) RX ORDER — PROPOFOL 10 MG/ML
INJECTION, EMULSION INTRAVENOUS
Status: DISPENSED
Start: 2022-02-19

## (undated) RX ORDER — ONDANSETRON 4 MG/1
TABLET, FILM COATED ORAL
Status: DISPENSED
Start: 2021-12-07

## (undated) RX ORDER — PROPOFOL 10 MG/ML
INJECTION, EMULSION INTRAVENOUS
Status: DISPENSED
Start: 2021-12-07

## (undated) RX ORDER — GABAPENTIN 300 MG/1
CAPSULE ORAL
Status: DISPENSED
Start: 2022-02-07

## (undated) RX ORDER — DEXAMETHASONE SODIUM PHOSPHATE 4 MG/ML
INJECTION, SOLUTION INTRA-ARTICULAR; INTRALESIONAL; INTRAMUSCULAR; INTRAVENOUS; SOFT TISSUE
Status: DISPENSED
Start: 2021-12-07

## (undated) RX ORDER — EPHEDRINE SULFATE 50 MG/ML
INJECTION, SOLUTION INTRAMUSCULAR; INTRAVENOUS; SUBCUTANEOUS
Status: DISPENSED
Start: 2021-11-16

## (undated) RX ORDER — CEFAZOLIN SODIUM 2 G/100ML
INJECTION, SOLUTION INTRAVENOUS
Status: DISPENSED
Start: 2021-11-16

## (undated) RX ORDER — LIDOCAINE HYDROCHLORIDE AND EPINEPHRINE 10; 10 MG/ML; UG/ML
INJECTION, SOLUTION INFILTRATION; PERINEURAL
Status: DISPENSED
Start: 2022-02-07

## (undated) RX ORDER — IODINE AND POTASSIUM IODIDE 50; 100 MG/ML; MG/ML
LIQUID ORAL
Status: DISPENSED
Start: 2022-02-07

## (undated) RX ORDER — FERRIC SUBSULFATE 0.21 G/G
LIQUID TOPICAL
Status: DISPENSED
Start: 2022-02-07

## (undated) RX ORDER — SODIUM CHLORIDE, SODIUM LACTATE, POTASSIUM CHLORIDE, CALCIUM CHLORIDE 600; 310; 30; 20 MG/100ML; MG/100ML; MG/100ML; MG/100ML
INJECTION, SOLUTION INTRAVENOUS
Status: DISPENSED
Start: 2021-12-07

## (undated) RX ORDER — ACETIC ACID 5 %
LIQUID (ML) MISCELLANEOUS
Status: DISPENSED
Start: 2022-02-07

## (undated) RX ORDER — LIDOCAINE HYDROCHLORIDE 10 MG/ML
INJECTION, SOLUTION EPIDURAL; INFILTRATION; INTRACAUDAL; PERINEURAL
Status: DISPENSED
Start: 2022-02-19

## (undated) RX ORDER — VASOPRESSIN 20 U/ML
INJECTION PARENTERAL
Status: DISPENSED
Start: 2022-02-19

## (undated) RX ORDER — DEXAMETHASONE SODIUM PHOSPHATE 4 MG/ML
INJECTION, SOLUTION INTRA-ARTICULAR; INTRALESIONAL; INTRAMUSCULAR; INTRAVENOUS; SOFT TISSUE
Status: DISPENSED
Start: 2021-11-16

## (undated) RX ORDER — LIDOCAINE HYDROCHLORIDE 20 MG/ML
INJECTION, SOLUTION EPIDURAL; INFILTRATION; INTRACAUDAL; PERINEURAL
Status: DISPENSED
Start: 2021-11-16